# Patient Record
Sex: FEMALE | Race: WHITE | NOT HISPANIC OR LATINO | Employment: OTHER | URBAN - METROPOLITAN AREA
[De-identification: names, ages, dates, MRNs, and addresses within clinical notes are randomized per-mention and may not be internally consistent; named-entity substitution may affect disease eponyms.]

---

## 2017-01-17 ENCOUNTER — ALLSCRIPTS OFFICE VISIT (OUTPATIENT)
Dept: OTHER | Facility: OTHER | Age: 62
End: 2017-01-17

## 2017-02-15 ENCOUNTER — GENERIC CONVERSION - ENCOUNTER (OUTPATIENT)
Dept: OTHER | Facility: OTHER | Age: 62
End: 2017-02-15

## 2017-05-01 ENCOUNTER — ALLSCRIPTS OFFICE VISIT (OUTPATIENT)
Dept: OTHER | Facility: OTHER | Age: 62
End: 2017-05-01

## 2017-05-01 DIAGNOSIS — R53.83 OTHER FATIGUE: ICD-10-CM

## 2017-05-01 DIAGNOSIS — J06.9 ACUTE UPPER RESPIRATORY INFECTION: ICD-10-CM

## 2017-05-01 DIAGNOSIS — E78.5 HYPERLIPIDEMIA: ICD-10-CM

## 2017-05-05 ENCOUNTER — ALLSCRIPTS OFFICE VISIT (OUTPATIENT)
Dept: OTHER | Facility: OTHER | Age: 62
End: 2017-05-05

## 2017-05-05 ENCOUNTER — TRANSCRIBE ORDERS (OUTPATIENT)
Dept: LAB | Facility: CLINIC | Age: 62
End: 2017-05-05

## 2017-06-07 ENCOUNTER — GENERIC CONVERSION - ENCOUNTER (OUTPATIENT)
Dept: OTHER | Facility: OTHER | Age: 62
End: 2017-06-07

## 2017-08-08 ENCOUNTER — GENERIC CONVERSION - ENCOUNTER (OUTPATIENT)
Dept: OTHER | Facility: OTHER | Age: 62
End: 2017-08-08

## 2018-01-11 NOTE — MISCELLANEOUS
Message  I spoke to the patient about her cholesterol   It is average and she is going to continue her dietary measures      Signatures   Electronically signed by : Valerie Bradley DO; Aug  8 2017  5:37PM EST                       (Author)

## 2018-01-12 VITALS
OXYGEN SATURATION: 96 % | SYSTOLIC BLOOD PRESSURE: 116 MMHG | HEART RATE: 99 BPM | DIASTOLIC BLOOD PRESSURE: 68 MMHG | HEIGHT: 67 IN | TEMPERATURE: 98.6 F

## 2018-01-12 VITALS
SYSTOLIC BLOOD PRESSURE: 110 MMHG | HEIGHT: 67 IN | TEMPERATURE: 98.2 F | HEART RATE: 76 BPM | DIASTOLIC BLOOD PRESSURE: 76 MMHG

## 2018-01-14 VITALS
SYSTOLIC BLOOD PRESSURE: 116 MMHG | HEIGHT: 67 IN | HEART RATE: 70 BPM | DIASTOLIC BLOOD PRESSURE: 62 MMHG | TEMPERATURE: 99.3 F

## 2018-01-17 ENCOUNTER — GENERIC CONVERSION - ENCOUNTER (OUTPATIENT)
Dept: INTERNAL MEDICINE CLINIC | Facility: CLINIC | Age: 63
End: 2018-01-17

## 2018-01-22 VITALS — SYSTOLIC BLOOD PRESSURE: 114 MMHG | DIASTOLIC BLOOD PRESSURE: 68 MMHG | HEART RATE: 72 BPM | RESPIRATION RATE: 15 BRPM

## 2018-05-30 ENCOUNTER — TELEPHONE (OUTPATIENT)
Dept: INTERNAL MEDICINE CLINIC | Facility: CLINIC | Age: 63
End: 2018-05-30

## 2018-05-30 ENCOUNTER — OFFICE VISIT (OUTPATIENT)
Dept: INTERNAL MEDICINE CLINIC | Facility: CLINIC | Age: 63
End: 2018-05-30
Payer: COMMERCIAL

## 2018-05-30 VITALS
HEIGHT: 67 IN | DIASTOLIC BLOOD PRESSURE: 70 MMHG | SYSTOLIC BLOOD PRESSURE: 112 MMHG | HEART RATE: 73 BPM | TEMPERATURE: 98.3 F | OXYGEN SATURATION: 99 %

## 2018-05-30 DIAGNOSIS — S91.331A PENETRATING WOUND OF RIGHT FOOT, INITIAL ENCOUNTER: Primary | ICD-10-CM

## 2018-05-30 DIAGNOSIS — G47.00 INSOMNIA, UNSPECIFIED TYPE: ICD-10-CM

## 2018-05-30 DIAGNOSIS — F41.9 ANXIETY: ICD-10-CM

## 2018-05-30 DIAGNOSIS — E78.5 BORDERLINE HYPERLIPIDEMIA: ICD-10-CM

## 2018-05-30 PROCEDURE — 99214 OFFICE O/P EST MOD 30 MIN: CPT | Performed by: INTERNAL MEDICINE

## 2018-05-30 PROCEDURE — 90471 IMMUNIZATION ADMIN: CPT | Performed by: INTERNAL MEDICINE

## 2018-05-30 PROCEDURE — 90715 TDAP VACCINE 7 YRS/> IM: CPT | Performed by: INTERNAL MEDICINE

## 2018-05-30 RX ORDER — DOXYCYCLINE 100 MG/1
CAPSULE ORAL
Refills: 0 | COMMUNITY
Start: 2018-05-29 | End: 2018-07-14

## 2018-05-30 RX ORDER — ZOLPIDEM TARTRATE 10 MG/1
1 TABLET ORAL
COMMUNITY
Start: 2016-04-12 | End: 2018-05-30 | Stop reason: SDUPTHER

## 2018-05-30 RX ORDER — LORAZEPAM 0.5 MG/1
0.5 TABLET ORAL EVERY 8 HOURS PRN
Qty: 30 TABLET | Refills: 0 | Status: SHIPPED | OUTPATIENT
Start: 2018-05-30 | End: 2019-07-09 | Stop reason: SDUPTHER

## 2018-05-30 RX ORDER — LORAZEPAM 0.5 MG/1
TABLET ORAL
COMMUNITY
Start: 2016-02-16 | End: 2018-05-30 | Stop reason: SDUPTHER

## 2018-05-30 RX ORDER — ZOLPIDEM TARTRATE 10 MG/1
10 TABLET ORAL
Qty: 30 TABLET | Refills: 0 | Status: SHIPPED | OUTPATIENT
Start: 2018-05-30 | End: 2019-01-23 | Stop reason: SDUPTHER

## 2018-05-30 NOTE — PROGRESS NOTES
Assessment/Plan:  Regarding the foot injury that seems to be stable and she was given a tetanus shot  Regarding her hyperlipidemia she is going to have complete blood work done  Regarding her anxiety and sleeplessness her lorazepam and her Ambien was renewed  She rarely uses them  She will call for the results of her study  No results found for this or any previous visit (from the past 1008 hour(s))  1  Penetrating wound of right foot, initial encounter  TDAP VACCINE GREATER THAN OR EQUAL TO 8YO IM   2  Borderline hyperlipidemia  CBC and differential    Comprehensive metabolic panel    Lipid panel    UA (URINE) with reflex to Microscopic   3  Anxiety  LORazepam (ATIVAN) 0 5 mg tablet   4  Insomnia, unspecified type  zolpidem (AMBIEN) 10 mg tablet       Orders Placed This Encounter   Procedures    TDAP VACCINE GREATER THAN OR EQUAL TO 8YO IM    CBC and differential    Comprehensive metabolic panel    Lipid panel    UA (URINE) with reflex to Microscopic         Subjective:   Problem are 1  Glass wound of the right foot  2   His history of difficulty sleeping 3  Is borderline hyperlipidemia  sSubjective:   Patient ID: Bridget Shin is a 65 y o  female  HPI I have not seen her in some time  She has a history of borderline hyperlipidemia  She had a glass wound that was extracted by a an urgent center  She needs a tetanus shot which she was given today  Otherwise she has been feeling very well  Has not had any recent blood work but is up-to-date on health maintenance issues with the exception of colonoscopies for which I will refer her to Gastroenterology  The following portions of the patient's history were reviewed and updated as appropriate:   She has no past medical history on file  ,   does not have a problem list on file  ,   has no past surgical history on file  ,  family history is not on file  ,   reports that she has never smoked   She has never used smokeless tobacco  She reports that she does not drink alcohol or use drugs  ,  has No Known Allergies       Current Outpatient Prescriptions:     aspirin 81 MG tablet, Take 81 mg by mouth, Disp: , Rfl:     doxycycline monohydrate (MONODOX) 100 mg capsule, , Disp: , Rfl: 0    LORazepam (ATIVAN) 0 5 mg tablet, Take 1 tablet (0 5 mg total) by mouth every 8 (eight) hours as needed for anxiety, Disp: 30 tablet, Rfl: 0    mupirocin (BACTROBAN) 2 % ointment, , Disp: , Rfl: 0    zolpidem (AMBIEN) 10 mg tablet, Take 1 tablet (10 mg total) by mouth daily at bedtime as needed for sleep, Disp: 30 tablet, Rfl: 0    Review of Systems   Constitutional: Negative for activity change, appetite change, chills, diaphoresis, fatigue, fever and unexpected weight change  HENT: Negative for congestion, ear pain, hearing loss, mouth sores, nosebleeds, postnasal drip, sinus pain, sinus pressure, sore throat and trouble swallowing  Eyes: Negative for pain, discharge and visual disturbance  Respiratory: Negative for apnea, cough, chest tightness, shortness of breath and wheezing  Cardiovascular: Negative for chest pain, palpitations and leg swelling  Gastrointestinal: Negative for abdominal pain, anal bleeding, blood in stool, constipation, diarrhea, nausea and vomiting  Endocrine: Negative for polydipsia and polyphagia  Genitourinary: Negative for decreased urine volume, dysuria, flank pain, frequency, hematuria and urgency  Musculoskeletal: Negative for arthralgias, back pain, gait problem, joint swelling and myalgias  Skin: Negative for rash and wound  Allergic/Immunologic: Negative for environmental allergies and food allergies  Neurological: Negative for dizziness, tremors, seizures, syncope, speech difficulty, light-headedness, numbness and headaches  Hematological: Negative for adenopathy  Does not bruise/bleed easily  Psychiatric/Behavioral: Positive for sleep disturbance   Negative for agitation, confusion, hallucinations and suicidal ideas  The patient is not nervous/anxious  She rarely takes an Ativan for rare anxiety  Objective:  Vitals:    05/30/18 1534   BP: 112/70   Pulse: 73   Temp: 98 3 °F (36 8 °C)   SpO2: 99%     There is no height or weight on file to calculate BMI  Physical Exam   Constitutional: She appears well-developed and well-nourished  No distress  HENT:   Head: Normocephalic  Right Ear: External ear normal    Left Ear: External ear normal    Nose: Nose normal    Mouth/Throat: Oropharynx is clear and moist  No oropharyngeal exudate  Eyes: Conjunctivae and EOM are normal  Pupils are equal, round, and reactive to light  Right eye exhibits no discharge  Left eye exhibits no discharge  Neck: Normal range of motion  Neck supple  No thyromegaly present  Cardiovascular: Normal rate, regular rhythm, normal heart sounds and intact distal pulses  Exam reveals no gallop and no friction rub  No murmur heard  Pulmonary/Chest: Effort normal and breath sounds normal  No respiratory distress  She has no wheezes  She has no rales  Abdominal: Soft  Bowel sounds are normal  She exhibits no distension and no mass  There is no tenderness  There is no rebound and no guarding  She is moderately overweight   Musculoskeletal: Normal range of motion  She exhibits no edema, tenderness or deformity  Lymphadenopathy:     She has no cervical adenopathy  Neurological: She is alert  She has normal reflexes  No cranial nerve deficit  Coordination normal    Skin: Skin is warm and dry  No rash noted  No erythema  Psychiatric: She has a normal mood and affect  Her behavior is normal  Judgment and thought content normal    Nursing note and vitals reviewed

## 2018-06-04 ENCOUNTER — TELEPHONE (OUTPATIENT)
Dept: INTERNAL MEDICINE CLINIC | Facility: CLINIC | Age: 63
End: 2018-06-04

## 2018-06-04 ENCOUNTER — APPOINTMENT (OUTPATIENT)
Dept: LAB | Facility: CLINIC | Age: 63
End: 2018-06-04
Payer: COMMERCIAL

## 2018-06-04 DIAGNOSIS — E78.5 BORDERLINE HYPERLIPIDEMIA: ICD-10-CM

## 2018-06-04 DIAGNOSIS — R53.83 OTHER FATIGUE: Primary | ICD-10-CM

## 2018-06-04 DIAGNOSIS — E53.8 VITAMIN B12 DEFICIENCY: ICD-10-CM

## 2018-06-04 DIAGNOSIS — E53.8 VITAMIN B12 DEFICIENCY: Primary | ICD-10-CM

## 2018-06-04 LAB
ALBUMIN SERPL BCP-MCNC: 3.9 G/DL (ref 3.5–5)
ALP SERPL-CCNC: 90 U/L (ref 46–116)
ALT SERPL W P-5'-P-CCNC: 21 U/L (ref 12–78)
ANION GAP SERPL CALCULATED.3IONS-SCNC: 5 MMOL/L (ref 4–13)
AST SERPL W P-5'-P-CCNC: 17 U/L (ref 5–45)
BASOPHILS # BLD AUTO: 0.07 THOUSANDS/ΜL (ref 0–0.1)
BASOPHILS NFR BLD AUTO: 1 % (ref 0–1)
BILIRUB SERPL-MCNC: 0.47 MG/DL (ref 0.2–1)
BILIRUB UR QL STRIP: NEGATIVE
BUN SERPL-MCNC: 21 MG/DL (ref 5–25)
CALCIUM SERPL-MCNC: 9.3 MG/DL (ref 8.3–10.1)
CHLORIDE SERPL-SCNC: 103 MMOL/L (ref 100–108)
CHOLEST SERPL-MCNC: 198 MG/DL (ref 50–200)
CLARITY UR: CLEAR
CO2 SERPL-SCNC: 29 MMOL/L (ref 21–32)
COLOR UR: YELLOW
CREAT SERPL-MCNC: 0.71 MG/DL (ref 0.6–1.3)
EOSINOPHIL # BLD AUTO: 0.11 THOUSAND/ΜL (ref 0–0.61)
EOSINOPHIL NFR BLD AUTO: 2 % (ref 0–6)
ERYTHROCYTE [DISTWIDTH] IN BLOOD BY AUTOMATED COUNT: 13.2 % (ref 11.6–15.1)
GFR SERPL CREATININE-BSD FRML MDRD: 92 ML/MIN/1.73SQ M
GLUCOSE P FAST SERPL-MCNC: 90 MG/DL (ref 65–99)
GLUCOSE UR STRIP-MCNC: NEGATIVE MG/DL
HCT VFR BLD AUTO: 39.7 % (ref 34.8–46.1)
HDLC SERPL-MCNC: 64 MG/DL (ref 40–60)
HGB BLD-MCNC: 12.6 G/DL (ref 11.5–15.4)
HGB UR QL STRIP.AUTO: NEGATIVE
IMM GRANULOCYTES # BLD AUTO: 0.01 THOUSAND/UL (ref 0–0.2)
IMM GRANULOCYTES NFR BLD AUTO: 0 % (ref 0–2)
KETONES UR STRIP-MCNC: NEGATIVE MG/DL
LDLC SERPL CALC-MCNC: 121 MG/DL (ref 0–100)
LEUKOCYTE ESTERASE UR QL STRIP: NEGATIVE
LYMPHOCYTES # BLD AUTO: 1.6 THOUSANDS/ΜL (ref 0.6–4.47)
LYMPHOCYTES NFR BLD AUTO: 32 % (ref 14–44)
MCH RBC QN AUTO: 30 PG (ref 26.8–34.3)
MCHC RBC AUTO-ENTMCNC: 31.7 G/DL (ref 31.4–37.4)
MCV RBC AUTO: 95 FL (ref 82–98)
MONOCYTES # BLD AUTO: 0.35 THOUSAND/ΜL (ref 0.17–1.22)
MONOCYTES NFR BLD AUTO: 7 % (ref 4–12)
NEUTROPHILS # BLD AUTO: 2.91 THOUSANDS/ΜL (ref 1.85–7.62)
NEUTS SEG NFR BLD AUTO: 58 % (ref 43–75)
NITRITE UR QL STRIP: NEGATIVE
NONHDLC SERPL-MCNC: 134 MG/DL
NRBC BLD AUTO-RTO: 0 /100 WBCS
PH UR STRIP.AUTO: 6 [PH] (ref 4.5–8)
PLATELET # BLD AUTO: 263 THOUSANDS/UL (ref 149–390)
PMV BLD AUTO: 10.7 FL (ref 8.9–12.7)
POTASSIUM SERPL-SCNC: 4.7 MMOL/L (ref 3.5–5.3)
PROT SERPL-MCNC: 7.5 G/DL (ref 6.4–8.2)
PROT UR STRIP-MCNC: NEGATIVE MG/DL
RBC # BLD AUTO: 4.2 MILLION/UL (ref 3.81–5.12)
SODIUM SERPL-SCNC: 137 MMOL/L (ref 136–145)
SP GR UR STRIP.AUTO: 1.02 (ref 1–1.03)
TRIGL SERPL-MCNC: 63 MG/DL
UROBILINOGEN UR QL STRIP.AUTO: 0.2 E.U./DL
VIT B12 SERPL-MCNC: 315 PG/ML (ref 100–900)
WBC # BLD AUTO: 5.05 THOUSAND/UL (ref 4.31–10.16)

## 2018-06-04 PROCEDURE — 81003 URINALYSIS AUTO W/O SCOPE: CPT | Performed by: INTERNAL MEDICINE

## 2018-06-04 PROCEDURE — 80053 COMPREHEN METABOLIC PANEL: CPT

## 2018-06-04 PROCEDURE — 80061 LIPID PANEL: CPT

## 2018-06-04 PROCEDURE — 85025 COMPLETE CBC W/AUTO DIFF WBC: CPT

## 2018-06-04 PROCEDURE — 82607 VITAMIN B-12: CPT

## 2018-06-04 PROCEDURE — 36415 COLL VENOUS BLD VENIPUNCTURE: CPT

## 2018-07-14 ENCOUNTER — OFFICE VISIT (OUTPATIENT)
Dept: INTERNAL MEDICINE CLINIC | Facility: CLINIC | Age: 63
End: 2018-07-14
Payer: COMMERCIAL

## 2018-07-14 ENCOUNTER — TELEPHONE (OUTPATIENT)
Dept: INTERNAL MEDICINE CLINIC | Facility: CLINIC | Age: 63
End: 2018-07-14

## 2018-07-14 VITALS
HEART RATE: 92 BPM | OXYGEN SATURATION: 95 % | RESPIRATION RATE: 16 BRPM | SYSTOLIC BLOOD PRESSURE: 104 MMHG | DIASTOLIC BLOOD PRESSURE: 72 MMHG

## 2018-07-14 DIAGNOSIS — H93.8X3 EAR CONGESTION, BILATERAL: Primary | ICD-10-CM

## 2018-07-14 DIAGNOSIS — R09.82 POST-NASAL DRIP: ICD-10-CM

## 2018-07-14 PROCEDURE — 99214 OFFICE O/P EST MOD 30 MIN: CPT | Performed by: PHYSICIAN ASSISTANT

## 2018-07-14 RX ORDER — FLUTICASONE PROPIONATE 50 MCG
2 SPRAY, SUSPENSION (ML) NASAL DAILY
Qty: 16 G | Refills: 2 | Status: SHIPPED | OUTPATIENT
Start: 2018-07-14 | End: 2018-07-14 | Stop reason: SDUPTHER

## 2018-07-14 RX ORDER — FLUTICASONE PROPIONATE 50 MCG
2 SPRAY, SUSPENSION (ML) NASAL DAILY
Qty: 16 G | Refills: 3 | Status: SHIPPED | OUTPATIENT
Start: 2018-07-14 | End: 2019-01-23 | Stop reason: CLARIF

## 2018-07-14 RX ORDER — PHENOL 1.4 %
600 AEROSOL, SPRAY (ML) MUCOUS MEMBRANE 2 TIMES DAILY WITH MEALS
COMMUNITY

## 2018-07-14 NOTE — TELEPHONE ENCOUNTER
Patient is at the pharmacy waiting for the RX for the nasal spray that was prescribed for her today  Can this be sent RAFAEL George Please ? ??

## 2018-07-14 NOTE — TELEPHONE ENCOUNTER
It was sent during her visit  I cannot make it go any faster  Since the pharmacy is very closeby, she probably got there too early,  Before they received it in their queue  Did she asked the pharmacy if they received the prescription? I cannot control how fast the pharmacy fills a prescription

## 2018-07-14 NOTE — PROGRESS NOTES
Assessment/Plan:     ear congestion //eustachian tube dysfunction- the patient may continue to use Sudafed as directed by the package until the symptoms resolve  She should especially use it before her flight  She should refrain from using Afrin for long periods of time  Only use this for several days at a time  In between use Flonase nasal spray daily and start taking an over-the-counter nonsedating antihistamine  No problem-specific Assessment & Plan notes found for this encounter  Diagnoses and all orders for this visit:    Post-nasal drip  -     Discontinue: fluticasone (FLONASE) 50 mcg/act nasal spray; 2 sprays into each nostril daily  -     fluticasone (FLONASE) 50 mcg/act nasal spray; 2 sprays into each nostril daily    Other orders  -     calcium carbonate (OS-RAYMUNDO) 600 MG tablet; Take 600 mg by mouth 2 (two) times a day with meals          Subjective:      Patient ID: Maya Frazier is a 65 y o  female  Patient comes in complaining of a clogged feeling in both of her ears  She has no pain  She has no  Sore throat, fever, chills or sweats  He slight cough but she blames this on mucus draining down her throat  Her nasal discharge is minimal and clear  She has bit of a stuffy nose  She says she flew home from [facility] 2 days ago  On her flight going there and coming back everything was fine until she descended on the way back  Since then she has had a muffled, pressure sensation in both of her ears  She has been using Afrin nose drops for the last 2 days and Sudafed  She is concerned because she is flying again next Wednesday, In 4 days, to [place]  The following portions of the patient's history were reviewed and updated as appropriate: allergies, current medications, past family history, past medical history, past social history, past surgical history and problem list     Review of Systems   Constitutional: Negative for chills and fever     HENT: Positive for congestion, postnasal drip and rhinorrhea  Negative for ear discharge, ear pain, sinus pain, sinus pressure and sore throat  Muffled congested sensation in ears   Respiratory: Positive for cough  Negative for shortness of breath  Cardiovascular: Negative for chest pain and palpitations  Gastrointestinal: Negative for abdominal pain  Objective:      /72   Pulse 92   Resp 16   SpO2 95%          Physical Exam   Constitutional: She appears well-developed and well-nourished  HENT:   Head: Normocephalic and atraumatic  Right Ear: Hearing, tympanic membrane, external ear and ear canal normal    Left Ear: Hearing, tympanic membrane, external ear and ear canal normal    Mouth/Throat: Oropharynx is clear and moist  No oropharyngeal exudate  Neck: Normal range of motion  Cardiovascular: Normal rate and regular rhythm  Pulmonary/Chest: Effort normal and breath sounds normal  No respiratory distress  Abdominal: Soft  Bowel sounds are normal  There is no tenderness

## 2018-10-05 ENCOUNTER — TELEPHONE (OUTPATIENT)
Dept: NEUROLOGY | Facility: CLINIC | Age: 63
End: 2018-10-05

## 2018-10-18 ENCOUNTER — TELEPHONE (OUTPATIENT)
Dept: INTERNAL MEDICINE CLINIC | Facility: CLINIC | Age: 63
End: 2018-10-18

## 2018-10-18 NOTE — TELEPHONE ENCOUNTER
PT  ROC  THINKS SHE WAS   BITE  BY  TICK   WANTS  TO  KNOW  IF  SHE  SHOULD  BE  ON  RX   [facility]  [address]  PT  [phone]

## 2018-11-05 ENCOUNTER — OFFICE VISIT (OUTPATIENT)
Dept: INTERNAL MEDICINE CLINIC | Facility: CLINIC | Age: 63
End: 2018-11-05
Payer: COMMERCIAL

## 2018-11-05 VITALS
DIASTOLIC BLOOD PRESSURE: 68 MMHG | HEART RATE: 80 BPM | OXYGEN SATURATION: 99 % | SYSTOLIC BLOOD PRESSURE: 136 MMHG | HEIGHT: 67 IN
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
## 2018-11-05 DIAGNOSIS — M13.0 POLYARTHRITIS: Primary | ICD-10-CM

## 2018-11-05 DIAGNOSIS — R53.83 OTHER FATIGUE: ICD-10-CM

## 2018-11-05 DIAGNOSIS — R21 FACIAL RASH: ICD-10-CM

## 2018-11-05 PROCEDURE — 99215 OFFICE O/P EST HI 40 MIN: CPT | Performed by: INTERNAL MEDICINE

## 2018-11-05 NOTE — PROGRESS NOTES
Assessment/Plan:  The patient has fatigue myalgias back pain and hip pain  She did have a tick bite and has a malar rash apparently under her makeup  She needs a rheumatological workup  She is going to have labs done  She is going to see a dermatologist     She is going to have x-rays of her hips and back  She is going to monitor her temperature over the next 2 weeks and see me back here in 10 days to review the rheumatological data  She may need referral to a rheumatologist   45 minutes spent with this patient  No results found for this or any previous visit (from the past 1008 hour(s))  1  Polyarthritis  CBC and Platelet    Comprehensive metabolic panel    RF Screen w/ Reflex to Titer    Lyme Antibody Profile with reflex to WB    Anti-DNA antibody, double-stranded    Sedimentation rate, automated    XR hip/pelv 2-3 vws left if performed    XR hip/pelv 2-3 vws right if performed    XR spine lumbar minimum 4 views non injury    UA (URINE) with reflex to Microscopic   2  Facial rash     3  Other fatigue  Cortisol Level, AM Specimen    T4, free    TSH, 3rd generation    Protein electrophoresis, serum   4  Tick bite, initial encounter         Orders Placed This Encounter   Procedures    XR hip/pelv 2-3 vws left if performed    XR hip/pelv 2-3 vws right if performed    XR spine lumbar minimum 4 views non injury    CBC and Platelet    Comprehensive metabolic panel    Cortisol Level, AM Specimen    T4, free    TSH, 3rd generation    RF Screen w/ Reflex to Titer    Lyme Antibody Profile with reflex to WB    Anti-DNA antibody, double-stranded    Protein electrophoresis, serum    Sedimentation rate, automated    UA (URINE) with reflex to Microscopic         Subjective:  She came see in with polyarthralgias and malaise  She has not felt well for a couple months  She said she developed right [place]  It was sudden onset  It swelled up    She waited 2 months and then saw an orthopedist in [facility] who tapped the knee and said she had arthritis  Other that she has not felt well recently over about 2 months  She has had multiple joint discomfort including her hips and her back  Also both knees  She also suffered a recent partial retinal detachment  She has noticed that her face turns red and seems to be read in the nasal of crest the bridge of the nose and both cheeks  She says she is tired all the time  She had a tick bite in addition  She took 2 doses of doxycycline after going to an Urgent Center  Patient ID: Nicolas Hernandez is a 59 y o  female  HPI    The following portions of the patient's history were reviewed and updated as appropriate:   She has no past medical history on file  ,   does not have a problem list on file  ,   has no past surgical history on file  ,  family history includes Celiac disease in her child and father; Other in her family and father  ,   reports that she has never smoked  She has never used smokeless tobacco  She reports that she does not drink alcohol or use drugs  ,  has No Known Allergies       Current Outpatient Prescriptions:     calcium carbonate (OS-RAYMUNDO) 600 MG tablet, Take 600 mg by mouth 2 (two) times a day with meals, Disp: , Rfl:     LORazepam (ATIVAN) 0 5 mg tablet, Take 1 tablet (0 5 mg total) by mouth every 8 (eight) hours as needed for anxiety, Disp: 30 tablet, Rfl: 0    zolpidem (AMBIEN) 10 mg tablet, Take 1 tablet (10 mg total) by mouth daily at bedtime as needed for sleep, Disp: 30 tablet, Rfl: 0    aspirin 81 MG tablet, Take 81 mg by mouth, Disp: , Rfl:     fluticasone (FLONASE) 50 mcg/act nasal spray, 2 sprays into each nostril daily (Patient not taking: Reported on 11/5/2018 ), Disp: 16 g, Rfl: 3    mupirocin (BACTROBAN) 2 % ointment, , Disp: , Rfl: 0    Review of Systems   Constitutional: Positive for fatigue   Negative for activity change, appetite change, chills, diaphoresis, fever and unexpected weight change  HENT: Negative for congestion, ear pain, hearing loss, mouth sores, nosebleeds, postnasal drip, sinus pain, sinus pressure, sore throat and trouble swallowing  Eyes: Negative for pain, discharge and visual disturbance  She has a partially detached retina   Respiratory: Negative for apnea, cough, chest tightness, shortness of breath and wheezing  Cardiovascular: Negative for chest pain, palpitations and leg swelling  Gastrointestinal: Negative for abdominal pain, anal bleeding, blood in stool, constipation, diarrhea, nausea and vomiting  She is not up-to-date on colonoscopies  Endocrine: Negative for polydipsia and polyphagia  Genitourinary: Negative for decreased urine volume, dysuria, flank pain, frequency, hematuria and urgency  She is not up-to-date on pelvic exams  Musculoskeletal: Positive for arthralgias, back pain and joint swelling  Negative for gait problem and myalgias  Skin: Positive for rash  Negative for wound  She says she has a malar rash   Allergic/Immunologic: Negative for environmental allergies and food allergies  Neurological: Negative for dizziness, tremors, seizures, syncope, speech difficulty, light-headedness, numbness and headaches  Hematological: Negative for adenopathy  Does not bruise/bleed easily  Psychiatric/Behavioral: Negative for agitation, confusion, hallucinations, sleep disturbance and suicidal ideas  The patient is not nervous/anxious  Objective:  /68 (BP Location: Left arm, Patient Position: Sitting)   Pulse 80   Ht 5' 7" (1 702 m)   SpO2 99%      Physical Exam   Constitutional: She appears well-developed and well-nourished  No distress  Temperature is 98 6°   HENT:   Head: Normocephalic  Right Ear: External ear normal    Left Ear: External ear normal    Nose: Nose normal    Mouth/Throat: Oropharynx is clear and moist  No oropharyngeal exudate     Eyes: Pupils are equal, round, and reactive to light  Conjunctivae and EOM are normal  Right eye exhibits no discharge  Left eye exhibits no discharge  Neck: Normal range of motion  Neck supple  No thyromegaly present  Cardiovascular: Normal rate, regular rhythm, normal heart sounds and intact distal pulses  Exam reveals no gallop and no friction rub  No murmur heard  Pulmonary/Chest: Effort normal and breath sounds normal  No respiratory distress  She has no wheezes  She has no rales  Abdominal: Soft  Bowel sounds are normal  She exhibits no distension and no mass  There is no tenderness  There is no rebound and no guarding  Musculoskeletal: Normal range of motion  She exhibits no edema, tenderness or deformity  Lymphadenopathy:     She has no cervical adenopathy  Neurological: She is alert  She has normal reflexes  No cranial nerve deficit  Coordination normal    Skin: Skin is warm and dry  No rash noted  No erythema  Psychiatric: She has a normal mood and affect  Her behavior is normal  Judgment and thought content normal    Nursing note and vitals reviewed

## 2018-11-06 ENCOUNTER — TELEPHONE (OUTPATIENT)
Dept: INTERNAL MEDICINE CLINIC | Facility: CLINIC | Age: 63
End: 2018-11-06

## 2018-11-06 NOTE — TELEPHONE ENCOUNTER
PT HAS APPT TO GET XRAY'S DONE AT 1:00PM  2 OF THE XRAY ORDERS SAY "IF PERFORMED", [facility] ARE QUESTIONING WHY 2 OF THE XRAY'S SAY IF PERFORMED    PLEASE ADVISE, CALL BACK

## 2018-11-07 LAB
ALBUMIN SERPL ELPH-MCNC: 3.7 G/DL (ref 3.8–4.8)
ALBUMIN SERPL-MCNC: 3.8 G/DL (ref 3.6–5.1)
ALBUMIN/GLOB SERPL: 1.5 (CALC) (ref 1–2.5)
ALP SERPL-CCNC: 80 U/L (ref 33–130)
ALPHA1 GLOB SERPL ELPH-MCNC: 0.3 G/DL (ref 0.2–0.3)
ALPHA2 GLOB SERPL ELPH-MCNC: 0.7 G/DL (ref 0.5–0.9)
ALT SERPL-CCNC: 12 U/L (ref 6–29)
APPEARANCE UR: CLEAR
AST SERPL-CCNC: 14 U/L (ref 10–35)
B BURGDOR AB SER IA-ACNC: <0.9 INDEX
BASOPHILS # BLD AUTO: 60 CELLS/UL (ref 0–200)
BASOPHILS NFR BLD AUTO: 1 %
BETA1 GLOB SERPL ELPH-MCNC: 0.4 G/DL (ref 0.4–0.6)
BETA2 GLOB SERPL ELPH-MCNC: 0.3 G/DL (ref 0.2–0.5)
BILIRUB SERPL-MCNC: 0.6 MG/DL (ref 0.2–1.2)
BILIRUB UR QL STRIP: NEGATIVE
BUN SERPL-MCNC: 19 MG/DL (ref 7–25)
BUN/CREAT SERPL: NORMAL (CALC) (ref 6–22)
CALCIUM SERPL-MCNC: 8.6 MG/DL (ref 8.6–10.4)
CHLORIDE SERPL-SCNC: 104 MMOL/L (ref 98–110)
CO2 SERPL-SCNC: 26 MMOL/L (ref 20–32)
COLOR UR: YELLOW
CORTIS AM PEAK SERPL-MCNC: 4 MCG/DL
CREAT SERPL-MCNC: 0.71 MG/DL (ref 0.5–0.99)
DSDNA AB SER-ACNC: 6 IU/ML
EOSINOPHIL # BLD AUTO: 162 CELLS/UL (ref 15–500)
EOSINOPHIL NFR BLD AUTO: 2.7 %
ERYTHROCYTE [DISTWIDTH] IN BLOOD BY AUTOMATED COUNT: 12.6 % (ref 11–15)
ERYTHROCYTE [SEDIMENTATION RATE] IN BLOOD BY WESTERGREN METHOD: 6 MM/H
GAMMA GLOB SERPL ELPH-MCNC: 0.9 G/DL (ref 0.8–1.7)
GLOBULIN SER CALC-MCNC: 2.5 G/DL (CALC) (ref 1.9–3.7)
GLUCOSE SERPL-MCNC: 87 MG/DL (ref 65–99)
GLUCOSE UR QL STRIP: NEGATIVE
HCT VFR BLD AUTO: 35.3 % (ref 35–45)
HGB BLD-MCNC: 11.7 G/DL (ref 11.7–15.5)
HGB UR QL STRIP: NEGATIVE
KETONES UR QL STRIP: NEGATIVE
LEUKOCYTE ESTERASE UR QL STRIP: NEGATIVE
LYMPHOCYTES # BLD AUTO: 1230 CELLS/UL (ref 850–3900)
LYMPHOCYTES NFR BLD AUTO: 20.5 %
MCH RBC QN AUTO: 30.1 PG (ref 27–33)
MCHC RBC AUTO-ENTMCNC: 33.1 G/DL (ref 32–36)
MCV RBC AUTO: 90.7 FL (ref 80–100)
MONOCYTES # BLD AUTO: 450 CELLS/UL (ref 200–950)
MONOCYTES NFR BLD AUTO: 7.5 %
NEUTROPHILS # BLD AUTO: 4098 CELLS/UL (ref 1500–7800)
NEUTROPHILS NFR BLD AUTO: 68.3 %
NITRITE UR QL STRIP: NEGATIVE
PH UR STRIP: 5.5 [PH] (ref 5–8)
PLATELET # BLD AUTO: 269 THOUSAND/UL (ref 140–400)
PMV BLD REES-ECKER: 9.6 FL (ref 7.5–12.5)
POTASSIUM SERPL-SCNC: 4.4 MMOL/L (ref 3.5–5.3)
PROT SERPL-MCNC: 6.3 G/DL (ref 6.1–8.1)
PROT SERPL-MCNC: 6.3 G/DL (ref 6.1–8.1)
PROT UR QL STRIP: NEGATIVE
RBC # BLD AUTO: 3.89 MILLION/UL (ref 3.8–5.1)
RHEUMATOID FACT SERPL-ACNC: <14 IU/ML
SL AMB EGFR AFRICAN AMERICAN: 106 ML/MIN/1.73M2
SL AMB EGFR NON AFRICAN AMERICAN: 91 ML/MIN/1.73M2
SODIUM SERPL-SCNC: 136 MMOL/L (ref 135–146)
SP GR UR STRIP: 1.01 (ref 1–1.03)
T4 FREE SERPL-MCNC: 1.1 NG/DL (ref 0.8–1.8)
TSH SERPL-ACNC: 2.72 MIU/L (ref 0.4–4.5)
WBC # BLD AUTO: 6 THOUSAND/UL (ref 3.8–10.8)

## 2018-11-21 ENCOUNTER — OFFICE VISIT (OUTPATIENT)
Dept: INTERNAL MEDICINE CLINIC | Facility: CLINIC | Age: 63
End: 2018-11-21
Payer: COMMERCIAL

## 2018-11-21 VITALS
DIASTOLIC BLOOD PRESSURE: 66 MMHG | HEART RATE: 86 BPM | OXYGEN SATURATION: 96 % | HEIGHT: 67 IN | SYSTOLIC BLOOD PRESSURE: 118 MMHG

## 2018-11-21 DIAGNOSIS — M13.0 POLYARTHRITIS: ICD-10-CM

## 2018-11-21 DIAGNOSIS — Z23 ENCOUNTER FOR IMMUNIZATION: Primary | ICD-10-CM

## 2018-11-21 PROCEDURE — 99214 OFFICE O/P EST MOD 30 MIN: CPT | Performed by: INTERNAL MEDICINE

## 2018-11-21 PROCEDURE — 1036F TOBACCO NON-USER: CPT | Performed by: INTERNAL MEDICINE

## 2018-11-21 PROCEDURE — 90682 RIV4 VACC RECOMBINANT DNA IM: CPT | Performed by: INTERNAL MEDICINE

## 2018-11-21 PROCEDURE — 90471 IMMUNIZATION ADMIN: CPT | Performed by: INTERNAL MEDICINE

## 2018-11-21 RX ORDER — LANOLIN ALCOHOL/MO/W.PET/CERES
1 CREAM (GRAM) TOPICAL DAILY
COMMUNITY

## 2018-11-21 NOTE — PROGRESS NOTES
Assessment/Plan:  Physically she feels much better than she did  It is possible she had a viral syndrome  Lyme serology was negative  She did have a borderline low cortisol and anti-double-stranded DNA  We decided to repeat those in 2 months and see her at that time  If any other symptoms developed prior to that time she will let me know  She will get a flu shot today      Recent Results (from the past 1008 hour(s))   Protein electrophoresis, serum    Collection Time: 11/06/18  9:44 AM   Result Value Ref Range    SL AMB PROTEIN, TOTAL 6 3 6 1 - 8 1 g/dL    Albumin, Electrophoresis 3 7 (L) 3 8 - 4 8 g/dL    Alpha-1 Globulin 0 3 0 2 - 0 3 g/dL    Alpha-2 Globulin 0 7 0 5 - 0 9 g/dL    Beta 1 Globulin 0 4 0 4 - 0 6 g/dL    Beta 2 Globulin 0 3 0 2 - 0 5 g/dL    Gamma Globulin 0 9 0 8 - 1 7 g/dL    Interpretation     Comprehensive metabolic panel    Collection Time: 11/06/18  9:44 AM   Result Value Ref Range    Glucose, Random 87 65 - 99 mg/dL    BUN 19 7 - 25 mg/dL    Creatinine 0 71 0 50 - 0 99 mg/dL    eGFR Non  91 > OR = 60 mL/min/1 73m2    SL AMB EGFR  106 > OR = 60 mL/min/1 73m2    SL AMB BUN/CREATININE RATIO NOT APPLICABLE 6 - 22 (calc)    Sodium 136 135 - 146 mmol/L    Potassium 4 4 3 5 - 5 3 mmol/L    Chloride 104 98 - 110 mmol/L    CO2 26 20 - 32 mmol/L    SL AMB CALCIUM 8 6 8 6 - 10 4 mg/dL    SL AMB PROTEIN, TOTAL 6 3 6 1 - 8 1 g/dL    Albumin 3 8 3 6 - 5 1 g/dL    Globulin 2 5 1 9 - 3 7 g/dL (calc)    Albumin/Globulin Ratio 1 5 1 0 - 2 5 (calc)    TOTAL BILIRUBIN 0 6 0 2 - 1 2 mg/dL    Alkaline Phosphatase 80 33 - 130 U/L    SL AMB AST 14 10 - 35 U/L    SL AMB ALT 12 6 - 29 U/L   Sedimentation rate, automated    Collection Time: 11/06/18  9:44 AM   Result Value Ref Range    Sedimentation Rate 6 < OR = 30 mm/h   CBC and differential    Collection Time: 11/06/18  9:44 AM   Result Value Ref Range    White Blood Cell Count 6 0 3 8 - 10 8 Thousand/uL    Red Blood Cell Count 3  89 3 80 - 5 10 Million/uL    Hemoglobin 11 7 11 7 - 15 5 g/dL    HCT 35 3 35 0 - 45 0 %    MCV 90 7 80 0 - 100 0 fL    MCH 30 1 27 0 - 33 0 pg    MCHC 33 1 32 0 - 36 0 g/dL    RDW 12 6 11 0 - 15 0 %    Platelet Count 729 960 - 400 Thousand/uL    SL AMB MPV 9 6 7 5 - 12 5 fL    Neutrophils (Absolute) 4,098 1,500 - 7,800 cells/uL    Lymphocytes (Absolute) 1,230 850 - 3,900 cells/uL    Monocytes (Absolute) 450 200 - 950 cells/uL    Eosinophils (Absolute) 162 15 - 500 cells/uL    Basophils ABS 60 0 - 200 cells/uL    Neutrophils 68 3 %    Lymphocytes 20 5 %    Monocytes 7 5 %    Eosinophils 2 7 %    Basophils PCT 1 0 %   Lyme Antibody Profile with reflex to WB    Collection Time: 11/06/18  9:44 AM   Result Value Ref Range    SL AMB LYME AB SCREEN <0 90 index   Anti-DNA antibody, double-stranded    Collection Time: 11/06/18  9:44 AM   Result Value Ref Range    ds DNA Ab 6 (H) IU/mL   Rheumatoid Factor    Collection Time: 11/06/18  9:44 AM   Result Value Ref Range    Rheumatoid Factor <14 <14 IU/mL   T4, free    Collection Time: 11/06/18  9:44 AM   Result Value Ref Range    Free t4 1 1 0 8 - 1 8 ng/dL   TSH, 3rd generation    Collection Time: 11/06/18  9:44 AM   Result Value Ref Range    TSH 2 72 0 40 - 4 50 mIU/L   Cortisol Level, AM Specimen    Collection Time: 11/06/18  9:44 AM   Result Value Ref Range    Cortisol AM 4 0 mcg/dL   Urinalysis with reflex to microscopic    Collection Time: 11/06/18  9:44 AM   Result Value Ref Range    Color UA YELLOW YELLOW    Urine Appearance CLEAR CLEAR    Specific Gravity 1 009 1 001 - 1 035    Ph 5 5 5 0 - 8 0    Glucose, Urine NEGATIVE NEGATIVE    Bilirubin, Urine NEGATIVE NEGATIVE    Ketone, Urine NEGATIVE NEGATIVE    Blood, Urine NEGATIVE NEGATIVE    Protein, Urine NEGATIVE NEGATIVE    SL AMB NITRITES URINE, QUAL  NEGATIVE NEGATIVE    Leukocyte Esterase NEGATIVE NEGATIVE       1   Encounter for immunization  influenza vaccine, [id number], quadrivalent, recombinant, PF, 0 5 mL, for patients 18 yr+ (FLUBLOK)   2  Polyarthritis  Cortisol Level, AM Specimen    Anti-DNA antibody, double-stranded       Orders Placed This Encounter   Procedures    influenza vaccine, [id number], quadrivalent, recombinant, PF, 0 5 mL, for patients 18 yr+ (FLUBLOK)    Cortisol Level, AM Specimen    Anti-DNA antibody, double-stranded         Subjective:  Poly arthralgias and fatigue     Patient ID: Zander Weathers is a 59 y o  female  HPI she comes in for follow-up  She is feeling much better  Her nodules are better and her fatigue is somewhat better as well  She is still having some knee pain  Workup showed intermediate anti-double-stranded DNA  Her malar rash went away completely  Her cortisol level was borderline low but was taking it 9:30 a m  Esvin Smith The following portions of the patient's history were reviewed and updated as appropriate:   She has no past medical history on file  ,   does not have a problem list on file  ,   has no past surgical history on file  ,  family history includes Celiac disease in her child and father; Other in her family and father  ,   reports that she has never smoked  She has never used smokeless tobacco  She reports that she does not drink alcohol or use drugs  ,  has No Known Allergies       Current Outpatient Prescriptions:     calcium carbonate (OS-RAYMUNDO) 600 MG tablet, Take 600 mg by mouth 2 (two) times a day with meals, Disp: , Rfl:     glucosamine-chondroitin 500-400 MG tablet, Take 1 tablet by mouth daily, Disp: , Rfl:     LORazepam (ATIVAN) 0 5 mg tablet, Take 1 tablet (0 5 mg total) by mouth every 8 (eight) hours as needed for anxiety, Disp: 30 tablet, Rfl: 0    zolpidem (AMBIEN) 10 mg tablet, Take 1 tablet (10 mg total) by mouth daily at bedtime as needed for sleep, Disp: 30 tablet, Rfl: 0    aspirin 81 MG tablet, Take 81 mg by mouth, Disp: , Rfl:     fluticasone (FLONASE) 50 mcg/act nasal spray, 2 sprays into each nostril daily (Patient not taking: Reported on 11/21/2018 ), Disp: 16 g, Rfl: 3    mupirocin (BACTROBAN) 2 % ointment, , Disp: , Rfl: 0    Review of Systems   Constitutional: Negative for activity change, appetite change, chills, diaphoresis, fatigue, fever and unexpected weight change  Overall feeling much better  Feels more like herself  No definite fevers though she forgot to take her temperature  HENT: Negative for congestion, ear pain, hearing loss, mouth sores, nosebleeds, postnasal drip, sinus pain, sinus pressure, sore throat and trouble swallowing  Eyes: Negative for pain, discharge and visual disturbance  Respiratory: Negative for apnea, cough, chest tightness, shortness of breath and wheezing  Cardiovascular: Negative for chest pain, palpitations and leg swelling  Gastrointestinal: Negative for abdominal pain, anal bleeding, blood in stool, constipation, diarrhea, nausea and vomiting  Endocrine: Negative for polydipsia and polyphagia  Genitourinary: Negative for decreased urine volume, dysuria, flank pain, frequency, hematuria and urgency  Musculoskeletal: Negative for arthralgias, back pain, gait problem, joint swelling and myalgias  Skin: Negative for rash and wound  Allergic/Immunologic: Negative for environmental allergies and food allergies  Neurological: Negative for dizziness, tremors, seizures, syncope, speech difficulty, light-headedness, numbness and headaches  Hematological: Negative for adenopathy  Does not bruise/bleed easily  Psychiatric/Behavioral: Negative for agitation, confusion, hallucinations, sleep disturbance and suicidal ideas  The patient is not nervous/anxious  Objective:  /66 (BP Location: Left arm, Patient Position: Sitting)   Pulse 86   Ht 5' 7" (1 702 m)   SpO2 96%      Physical Exam   Constitutional: She appears well-developed and well-nourished  No distress  Temperature is 98 2°  HENT:   Head: Normocephalic     Right Ear: External ear normal    Left Ear: External ear normal    Nose: Nose normal    Mouth/Throat: Oropharynx is clear and moist  No oropharyngeal exudate  Eyes: Pupils are equal, round, and reactive to light  Conjunctivae and EOM are normal  Right eye exhibits no discharge  Left eye exhibits no discharge  Neck: Normal range of motion  Neck supple  No thyromegaly present  Cardiovascular: Normal rate, regular rhythm, normal heart sounds and intact distal pulses  Exam reveals no gallop and no friction rub  No murmur heard  Pulmonary/Chest: Effort normal and breath sounds normal  No respiratory distress  She has no wheezes  She has no rales  Abdominal: Soft  Bowel sounds are normal  She exhibits no distension and no mass  There is no tenderness  There is no rebound and no guarding  Musculoskeletal: Normal range of motion  She exhibits no edema, tenderness or deformity  Lymphadenopathy:     She has no cervical adenopathy  Neurological: She is alert  She has normal reflexes  No cranial nerve deficit  Coordination normal    Skin: Skin is warm and dry  No rash noted  No erythema  Malar rash has disappeared  Psychiatric: She has a normal mood and affect  Her behavior is normal  Judgment and thought content normal    Nursing note and vitals reviewed

## 2019-01-09 ENCOUNTER — EVALUATION (OUTPATIENT)
Dept: PHYSICAL THERAPY | Facility: CLINIC | Age: 64
End: 2019-01-09
Payer: COMMERCIAL

## 2019-01-09 ENCOUNTER — TRANSCRIBE ORDERS (OUTPATIENT)
Dept: PHYSICAL THERAPY | Facility: CLINIC | Age: 64
End: 2019-01-09

## 2019-01-09 DIAGNOSIS — G89.29 CHRONIC PAIN OF RIGHT KNEE: Primary | ICD-10-CM

## 2019-01-09 DIAGNOSIS — M25.561 CHRONIC PAIN OF RIGHT KNEE: Primary | ICD-10-CM

## 2019-01-09 DIAGNOSIS — M25.561 RIGHT KNEE PAIN, UNSPECIFIED CHRONICITY: Primary | ICD-10-CM

## 2019-01-09 PROCEDURE — G8991 OTHER PT/OT GOAL STATUS: HCPCS | Performed by: PHYSICAL THERAPIST

## 2019-01-09 PROCEDURE — G8990 OTHER PT/OT CURRENT STATUS: HCPCS | Performed by: PHYSICAL THERAPIST

## 2019-01-09 PROCEDURE — 97161 PT EVAL LOW COMPLEX 20 MIN: CPT | Performed by: PHYSICAL THERAPIST

## 2019-01-09 NOTE — LETTER
2019    Luetta Cheadle, [facility]    Patient: Wes Nunes   YOB: 1955   Date of Visit: 2019     Encounter Diagnosis     ICD-10-CM    1  Right knee pain, unspecified chronicity M25 561        Dear Dr Jonathan Navarro:    Please review the attached Plan of Care from [facility] recent visit  Please verify that you agree therapy should continue by signing the attached document and sending it back to our office  If you have any questions or concerns, please don't hesitate to call  Sincerely,    Rolando Gilliland PT      Referring Provider:      I certify that I have read the below Plan of Care and certify the need for these services furnished under this plan of treatment while under my care  Luetta Cheadle, MD  [address]: [phone]          PT Evaluation     Today's date: 2019  Patient name: Wes Nunes  : 1955  MRN: [id number]  Referring provider: Denny Zhu MD  Dx:   Encounter Diagnosis     ICD-10-CM    1  Right knee pain, unspecified chronicity M25 561        Start Time: 1400  Stop Time: 1445  Total time in clinic (min): 45 minutes    Assessment  Assessment details: Wes Nunes is a 59 y o  female who presents to the clinic with complaints of right knee pain spanning several months  Patient with increased pain during ambulation, stairs, and with transitional movements from knee extension to flexion  Patient presents with the above listed impairments  She is motivated to decrease her pain and should benefit from skilled physical therapy  Thank you for the referral       Impairments: abnormal gait, abnormal or restricted ROM, activity intolerance, impaired physical strength, lacks appropriate home exercise program, pain with function and weight-bearing intolerance  Understanding of Dx/Px/POC: good   Prognosis: good    Goals  Impairment Goals:  1    Patient will decrease complaints of pain to 4/10 at worst in 2 weeks  2  Patient will increase ROM to at least 125 knee flexion on right in 4 weeks  3  Patient will increase hip strength by 1/2 grade in 4 weeks    Functional Goals:  1  Patient will be independent with HEP by discharge  2  Patient will increase FOTO to at least 63  3  Patient will ascend/descend flight of stairs with decreased pain in 4 weeks  4  Patient will tolerate prone lying with decreased pain in 4 weeks  5  Patient will tolerate wearing shoes with a heel with decreased complaints of pain in 4 weeks      Plan  Patient would benefit from: skilled physical therapy  Planned modality interventions: TENS, thermotherapy: hydrocollator packs and cryotherapy  Planned therapy interventions: abdominal trunk stabilization, balance/weight bearing training, balance, flexibility, functional ROM exercises, graded activity, graded exercise, home exercise program, gait training, therapeutic training, therapeutic exercise, therapeutic activities, stretching, strengthening, patient education, neuromuscular re-education, muscle pump exercises, motor coordination training, Sampson taping, manual therapy, joint mobilization and massage  Frequency: 2x week  Duration in weeks: 4  Treatment plan discussed with: patient        Subjective Evaluation    History of Present Illness  Mechanism of injury: Patient reports right knee pain starting in September after walking on vacation at the beach  The patient notes she had an injection in he right knee in October with relief of symptoms  She now notes kicking a ball in December and feeling the pain resurface again  Patient had seen an ortho in October and was referred to physical therapy  Pain Location:  Right lumbar spine as well as posterior right knee   Occupation:    Prior Functional Limitations:   Independent,   AGG:  Kneeling, ascending/descenidng, sleeping prone, wearing heels  Ease:  Ice  Patient Goals:  "I just want to get rid of the pain"    Pain  Current pain ratin  At best pain ratin  At worst pain ratin  Quality: sharp and knife-like    Patient Goals  Patient goal: I just want to get rid of the pain"        Objective     Neurological Testing     Sensation     Knee   Left Knee   Intact: light touch    Right Knee   Intact: light touch     Additional Neurological Details  Neurovascular Intact    Active Range of Motion   Left Knee   Flexion: 130 degrees   Extension: 0 degrees     Right Knee   Flexion: 121 degrees with pain  Extension: 0 degrees     Strength/Myotome Testing     Left Knee   Flexion: 4  Extension: 4    Right Knee   Flexion: 4-  Extension: 4    Tests     Left Knee   Negative anterior Lachman, Apley's compression, valgus stress test at 0 degrees, valgus stress test at 30 degrees, varus stress test at 0 degrees and varus stress test at 30 degrees  Right Knee   Positive Apley's compression  Negative anterior Lachman, valgus stress test at 0 degrees, valgus stress test at 30 degrees, varus stress test at 0 degrees and varus stress test at 30 degrees         Flowsheet Rows      Most Recent Value   PT/OT G-Codes   Current Score  50   Projected Score  63   FOTO information reviewed  Yes   Assessment Type  Evaluation   G code set  Other PT/OT Primary   Other PT Primary Current Status ()  CK   Other PT Primary Goal Status ()  CH          Diagnosis:    Precautions:    Manuals        PROM        Mobs                        Exercise Diary        Recumbent Bike        Quad Sets        SLR        SL Hip ABD        Clamshells        Hamstring S        X-Walks        Leg Press                                                                                                        Modalities             CP PRN

## 2019-01-09 NOTE — PROGRESS NOTES
PT Evaluation     Today's date: 2019  Patient name: Zo Beard  : 1955  MRN: [id number]  Referring provider: Sandy Taylor MD  Dx:   Encounter Diagnosis     ICD-10-CM    1  Right knee pain, unspecified chronicity M25 561        Start Time: 1400  Stop Time: 1445  Total time in clinic (min): 45 minutes    Assessment  Assessment details: Zo Beard is a 59 y o  female who presents to the clinic with complaints of right knee pain spanning several months  Patient with increased pain during ambulation, stairs, and with transitional movements from knee extension to flexion  Patient presents with the above listed impairments  She is motivated to decrease her pain and should benefit from skilled physical therapy  Thank you for the referral       Impairments: abnormal gait, abnormal or restricted ROM, activity intolerance, impaired physical strength, lacks appropriate home exercise program, pain with function and weight-bearing intolerance  Understanding of Dx/Px/POC: good   Prognosis: good    Goals  Impairment Goals:  1  Patient will decrease complaints of pain to 4/10 at worst in 2 weeks  2  Patient will increase ROM to at least 125 knee flexion on right in 4 weeks  3  Patient will increase hip strength by 1/2 grade in 4 weeks    Functional Goals:  1  Patient will be independent with HEP by discharge  2  Patient will increase FOTO to at least 63  3  Patient will ascend/descend flight of stairs with decreased pain in 4 weeks  4  Patient will tolerate prone lying with decreased pain in 4 weeks  5    Patient will tolerate wearing shoes with a heel with decreased complaints of pain in 4 weeks      Plan  Patient would benefit from: skilled physical therapy  Planned modality interventions: TENS, thermotherapy: hydrocollator packs and cryotherapy  Planned therapy interventions: abdominal trunk stabilization, balance/weight bearing training, balance, flexibility, functional ROM exercises, graded activity, graded exercise, home exercise program, gait training, therapeutic training, therapeutic exercise, therapeutic activities, stretching, strengthening, patient education, neuromuscular re-education, muscle pump exercises, motor coordination training, Sampson taping, manual therapy, joint mobilization and massage  Frequency: 2x week  Duration in weeks: 4  Treatment plan discussed with: patient        Subjective Evaluation    History of Present Illness  Mechanism of injury: Patient reports right knee pain starting in September after walking on vacation at the beach  The patient notes she had an injection in he right knee in October with relief of symptoms  She now notes kicking a ball in December and feeling the pain resurface again  Patient had seen an ortho in October and was referred to physical therapy  Pain Location:  Right lumbar spine as well as posterior right knee   Occupation:    Prior Functional Limitations:   Independent,   AGG:  Kneeling, ascending/descenidng, sleeping prone, wearing heels  Ease:  Ice  Patient Goals:  "I just want to get rid of the pain"    Pain  Current pain ratin  At best pain ratin  At worst pain ratin  Quality: sharp and knife-like    Patient Goals  Patient goal: I just want to get rid of the pain"        Objective     Neurological Testing     Sensation     Knee   Left Knee   Intact: light touch    Right Knee   Intact: light touch     Additional Neurological Details  Neurovascular Intact    Active Range of Motion   Left Knee   Flexion: 130 degrees   Extension: 0 degrees     Right Knee   Flexion: 121 degrees with pain  Extension: 0 degrees     Strength/Myotome Testing     Left Knee   Flexion: 4  Extension: 4    Right Knee   Flexion: 4-  Extension: 4    Tests     Left Knee   Negative anterior Lachman, Apley's compression, valgus stress test at 0 degrees, valgus stress test at 30 degrees, varus stress test at 0 degrees and varus stress test at 30 degrees  Right Knee   Positive Apley's compression  Negative anterior Lachman, valgus stress test at 0 degrees, valgus stress test at 30 degrees, varus stress test at 0 degrees and varus stress test at 30 degrees         Flowsheet Rows      Most Recent Value   PT/OT G-Codes   Current Score  50   Projected Score  63   FOTO information reviewed  Yes   Assessment Type  Evaluation   G code set  Other PT/OT Primary   Other PT Primary Current Status ()  CK   Other PT Primary Goal Status ()  CH          Diagnosis:    Precautions:    Manuals        PROM        Mobs                        Exercise Diary        Recumbent Bike        Quad Sets        SLR        SL Hip ABD        Clamshells        Hamstring S        X-Walks        Leg Press                                                                                                        Modalities             CP PRN

## 2019-01-11 ENCOUNTER — OFFICE VISIT (OUTPATIENT)
Dept: PHYSICAL THERAPY | Facility: CLINIC | Age: 64
End: 2019-01-11
Payer: COMMERCIAL

## 2019-01-11 DIAGNOSIS — M25.561 RIGHT KNEE PAIN, UNSPECIFIED CHRONICITY: Primary | ICD-10-CM

## 2019-01-11 PROCEDURE — 97140 MANUAL THERAPY 1/> REGIONS: CPT | Performed by: PHYSICAL THERAPIST

## 2019-01-11 PROCEDURE — 97112 NEUROMUSCULAR REEDUCATION: CPT | Performed by: PHYSICAL THERAPIST

## 2019-01-11 PROCEDURE — 97110 THERAPEUTIC EXERCISES: CPT | Performed by: PHYSICAL THERAPIST

## 2019-01-11 NOTE — PROGRESS NOTES
Daily Note     Today's date: 2019  Patient name: Thom Feliz  : 1955  MRN: [id number]  Referring provider: Bert Kilgore MD  Dx:   Encounter Diagnosis     ICD-10-CM    1  Right knee pain, unspecified chronicity M25 561        Start Time: 759  Stop Time: 847  Total time in clinic (min): 48 minutes    Subjective:  "I did my exercises and I feel like I'm going to get better "      Objective: See treatment diary below      Assessment: Tolerated treatment well  Patient would benefit from continued PT  Patient tolerated all exercises well today without complaints of pain  Upright bike trailed today with initial complaints of discomfort, but complaints subsided with continued revolutions  Continue to challenge PREs as able  Plan: Progress treatment as tolerated        Diagnosis:    Precautions:    Manuals        PROM Hip Abductor S / Hamstring S       Mobs                        Exercise Diary        Recumbent Bike 5 min       Quad Sets 5x       SLR 3x10       SL Hip ABD 3X10       Clamshells RTB 3x10       X-Walks RTB 20;x 2 / 10' x 2       Leg Press        Bridges 3x10       Lateral Step Downs 4" 2x10                                                                                       Modalities             CP PRN

## 2019-01-14 ENCOUNTER — OFFICE VISIT (OUTPATIENT)
Dept: PHYSICAL THERAPY | Facility: CLINIC | Age: 64
End: 2019-01-14
Payer: COMMERCIAL

## 2019-01-14 DIAGNOSIS — M25.561 RIGHT KNEE PAIN, UNSPECIFIED CHRONICITY: Primary | ICD-10-CM

## 2019-01-14 PROCEDURE — 97112 NEUROMUSCULAR REEDUCATION: CPT | Performed by: PHYSICAL THERAPIST

## 2019-01-14 PROCEDURE — 97140 MANUAL THERAPY 1/> REGIONS: CPT | Performed by: PHYSICAL THERAPIST

## 2019-01-14 PROCEDURE — 97110 THERAPEUTIC EXERCISES: CPT | Performed by: PHYSICAL THERAPIST

## 2019-01-14 NOTE — PROGRESS NOTES
Daily Note     Today's date: 2019  Patient name: Xenia Morales  : 1955  MRN: [id number]  Referring provider: Adrianna Silverio MD  Dx:   Encounter Diagnosis     ICD-10-CM    1  Right knee pain, unspecified chronicity M25 561        Start Time: 813  Stop Time: 858  Total time in clinic (min): 45 minutes    Subjective:  "I still feel the same  Should I be feeling better by now?"      Objective: See treatment diary below      Assessment: Tolerated treatment well  Patient would benefit from continued PT  Patient with some complaints of knee soreness underneath of the patella as well as lateral right knee  Decreased complaints of pain with manuals  Increased PREs without any complaints of pain  Continue to progress PREs as able  Plan: Progress treatment as tolerated  Diagnosis:    Precautions:    Manuals       PROM Hip Abductor S / Hamstring S Hip Abductor S       Mobs  Med/Lat   Inf/Sup Patella       IASTM  R IT band / lateral R knee               Exercise Diary        Recumbent Bike 5 min       Quad Sets 5x       SLR 3x10 2# 3x10 2 5# NV     SL Hip ABD 3X10 2# 3x10      Clamshells RTB 3x10 GTB 3x10      X-Walks RTB 20;x 2 / 10' x 2 RTB 20' x 2 / 10' x 2      Leg Press  70# 3x10      Bridges 3x10 Ball 2x10 / 23x10 hamstring      Lateral Step Downs 4" 2x10                                                                                       Modalities             CP PRN

## 2019-01-18 ENCOUNTER — APPOINTMENT (OUTPATIENT)
Dept: PHYSICAL THERAPY | Facility: CLINIC | Age: 64
End: 2019-01-18
Payer: COMMERCIAL

## 2019-01-21 ENCOUNTER — APPOINTMENT (OUTPATIENT)
Dept: PHYSICAL THERAPY | Facility: CLINIC | Age: 64
End: 2019-01-21
Payer: COMMERCIAL

## 2019-01-23 ENCOUNTER — OFFICE VISIT (OUTPATIENT)
Dept: INTERNAL MEDICINE CLINIC | Facility: CLINIC | Age: 64
End: 2019-01-23
Payer: COMMERCIAL

## 2019-01-23 VITALS
OXYGEN SATURATION: 95 % | HEART RATE: 77 BPM | DIASTOLIC BLOOD PRESSURE: 76 MMHG | SYSTOLIC BLOOD PRESSURE: 120 MMHG | HEIGHT: 67 IN

## 2019-01-23 DIAGNOSIS — E78.5 BORDERLINE HYPERLIPIDEMIA: ICD-10-CM

## 2019-01-23 DIAGNOSIS — H53.9 VISION CHANGES: Primary | ICD-10-CM

## 2019-01-23 DIAGNOSIS — M25.561 ARTHRALGIA OF RIGHT KNEE: ICD-10-CM

## 2019-01-23 DIAGNOSIS — R53.83 FATIGUE, UNSPECIFIED TYPE: ICD-10-CM

## 2019-01-23 DIAGNOSIS — G47.00 INSOMNIA, UNSPECIFIED TYPE: ICD-10-CM

## 2019-01-23 PROCEDURE — 1036F TOBACCO NON-USER: CPT | Performed by: INTERNAL MEDICINE

## 2019-01-23 PROCEDURE — 99214 OFFICE O/P EST MOD 30 MIN: CPT | Performed by: INTERNAL MEDICINE

## 2019-01-23 RX ORDER — ZOLPIDEM TARTRATE 10 MG/1
10 TABLET ORAL
Qty: 30 TABLET | Refills: 0 | Status: SHIPPED | OUTPATIENT
Start: 2019-01-23 | End: 2019-08-23 | Stop reason: SDUPTHER

## 2019-01-23 NOTE — PROGRESS NOTES
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  She was advised to get a carotid O ultrasound which will be scheduled  Regarding her blood work to be repeated she is going to do that along with a cholesterol profile  The knee pain and right leg pain will be followed in [place]  I will see her back here in 2 months unless any other symptoms developed  No results found for this or any previous visit (from the past 1008 hour(s))  1  Vision changes  VAS carotid complete study   2  Insomnia, unspecified type  zolpidem (AMBIEN) 10 mg tablet   3  Arthralgia of right knee  Anti-DNA antibody, double-stranded   4  Borderline hyperlipidemia  Lipid panel   5  Fatigue, unspecified type  Cortisol Level, AM Specimen       Orders Placed This Encounter   Procedures    Anti-DNA antibody, double-stranded    Cortisol Level, AM Specimen    Lipid panel         Subjective:  Generally she feels better  She is not is fatigue  She does have some specific complaints which is right knee pain and back pain with radiation down her leg  She has followed in [place] for the right knee pain and is to go back there in a week or 2  The right leg pain is fairly new and is going to discuss it with her orthopedist     She got her cortisol repeated as well as an anti-double-stranded DNA but they lost the specimen apparently  She is going to repeat that     Patient ID: Vita Batista is a 59 y o  female  HPI    The following portions of the patient's history were reviewed and updated as appropriate:   She has no past medical history on file  ,   does not have a problem list on file  ,   has no past surgical history on file  ,  family history includes Celiac disease in her child and father; Other in her family and father  ,   reports that she has never smoked  She has never used smokeless tobacco  She reports that she does not drink alcohol or use drugs  ,  has No Known Allergies       Current Outpatient Prescriptions:     calcium carbonate (OS-RAYMUNDO) 600 MG tablet, Take 600 mg by mouth 2 (two) times a day with meals, Disp: , Rfl:     glucosamine-chondroitin 500-400 MG tablet, Take 1 tablet by mouth daily, Disp: , Rfl:     LORazepam (ATIVAN) 0 5 mg tablet, Take 1 tablet (0 5 mg total) by mouth every 8 (eight) hours as needed for anxiety, Disp: 30 tablet, Rfl: 0    zolpidem (AMBIEN) 10 mg tablet, Take 1 tablet (10 mg total) by mouth daily at bedtime as needed for sleep, Disp: 30 tablet, Rfl: 0    mupirocin (BACTROBAN) 2 % ointment, , Disp: , Rfl: 0    Review of Systems   Constitutional: Positive for fatigue  Negative for activity change, appetite change, chills, diaphoresis, fever and unexpected weight change  HENT: Negative for congestion, ear pain, hearing loss, mouth sores, nosebleeds, postnasal drip, sinus pain, sinus pressure, sore throat and trouble swallowing  Eyes: Positive for visual disturbance  Negative for pain and discharge  She has developed partially detached retinas and was advised by her ophthalmologist to get a carotid ultrasound  Respiratory: Negative for apnea, cough, chest tightness, shortness of breath and wheezing  Cardiovascular: Negative for chest pain, palpitations and leg swelling  Gastrointestinal: Negative for abdominal pain, anal bleeding, blood in stool, constipation, diarrhea, nausea and vomiting  Endocrine: Negative for polydipsia and polyphagia  Genitourinary: Negative for decreased urine volume, dysuria, flank pain, frequency, hematuria and urgency  Musculoskeletal: Positive for arthralgias, back pain and joint swelling  Negative for gait problem and myalgias  Skin: Negative for rash and wound  Allergic/Immunologic: Negative for environmental allergies and food allergies  Neurological: Negative for dizziness, tremors, seizures, syncope, speech difficulty, light-headedness, numbness and headaches  Hematological: Negative for adenopathy  Does not bruise/bleed easily  Psychiatric/Behavioral: Negative for agitation, confusion, hallucinations, sleep disturbance and suicidal ideas  The patient is not nervous/anxious  Objective:  /76 (BP Location: Left arm, Patient Position: Sitting)   Pulse 77   Ht 5' 7" (1 702 m)   SpO2 95%      Physical Exam   Constitutional: She appears well-developed and well-nourished  No distress  Blood pressure is 110/80  Rhythm is regular  Rate is 80  HENT:   Head: Normocephalic  Right Ear: External ear normal    Left Ear: External ear normal    Nose: Nose normal    Mouth/Throat: Oropharynx is clear and moist  No oropharyngeal exudate  Eyes: Pupils are equal, round, and reactive to light  Conjunctivae and EOM are normal  Right eye exhibits no discharge  Left eye exhibits no discharge  Neck: Normal range of motion  Neck supple  No thyromegaly present  Cardiovascular: Normal rate, regular rhythm, normal heart sounds and intact distal pulses  Exam reveals no gallop and no friction rub  No murmur heard  Pulmonary/Chest: Effort normal and breath sounds normal  No respiratory distress  She has no wheezes  She has no rales  Abdominal: Soft  Bowel sounds are normal  She exhibits no distension and no mass  There is no tenderness  There is no rebound and no guarding  Musculoskeletal: Normal range of motion  She exhibits no edema, tenderness or deformity  Lymphadenopathy:     She has no cervical adenopathy  Neurological: She is alert  She has normal reflexes  No cranial nerve deficit  Coordination normal    Skin: Skin is warm and dry  No rash noted  No erythema  Psychiatric: She has a normal mood and affect  Her behavior is normal  Judgment and thought content normal    Nursing note and vitals reviewed

## 2019-01-25 ENCOUNTER — OFFICE VISIT (OUTPATIENT)
Dept: PHYSICAL THERAPY | Facility: CLINIC | Age: 64
End: 2019-01-25
Payer: COMMERCIAL

## 2019-01-25 ENCOUNTER — TELEPHONE (OUTPATIENT)
Dept: INTERNAL MEDICINE CLINIC | Facility: CLINIC | Age: 64
End: 2019-01-25

## 2019-01-25 DIAGNOSIS — M25.561 RIGHT KNEE PAIN, UNSPECIFIED CHRONICITY: Primary | ICD-10-CM

## 2019-01-25 PROCEDURE — 97140 MANUAL THERAPY 1/> REGIONS: CPT

## 2019-01-25 PROCEDURE — 97110 THERAPEUTIC EXERCISES: CPT

## 2019-01-25 NOTE — TELEPHONE ENCOUNTER
Pt cld stating that she spoke with [facility] and they are going to resend Dr Jazmine Tavera the results  She stated that they had them under a  of 60 instead of her correct    RODRI

## 2019-01-25 NOTE — PROGRESS NOTES
Daily Note     Today's date: 2019  Patient name: Thom Feliz  : 1955  MRN: [id number]  Referring provider: Bert Kilgore MD  Dx:   Encounter Diagnosis     ICD-10-CM    1  Right knee pain, unspecified chronicity M25 561                   Subjective: Patient reports, 'I just had my massage, and I feel great  I don't have any pain at all"  She reports that she has a follow up with her MD on Wednesday  Objective: See treatment diary below      Assessment:  Patient arrived 13 minutes late to today's session; she was accommodated  Tolerated treatment well  Patient exhibited good technique with therapeutic exercises  All exercises were performed pain free today and she was able to perform all progressions  Continue to progress and challenge patient to tolerance  Plan: Continue per plan of care  Diagnosis:    Precautions:    Manuals      PROM Hip Abductor S / Hamstring S Hip Abductor S  Hip Abductor and quad stretch - RO, PTA      Mobs  Med/Lat   Inf/Sup Patella       IASTM  R IT band / lateral R knee               Exercise Diary        Recumbent Bike 5 min   upright- 5 mins      Quad Sets 5x       SLR 3x10 2# 3x10 2 5# 3x10      SL Hip ABD 3X10 2# 3x10  2 5#, 3x10      Clamshells RTB 3x10 GTB 3x10  GTB, 3x10     X-Walks RTB 20;x 2 / 10' x 2 RTB 20' x 2 / 10' x 2 Blue, 1 5 laps at blue line      Leg Press  70# 3x10  70#, 3x10      Bridges 3x10 Ball 2x10 / 23x10 hamstring Knees straight, bent and curls: 10x ea     Lateral Step Downs 4" 2x10  6 in, 2x10                                                                                      Modalities             CP PRN

## 2019-01-28 ENCOUNTER — TELEPHONE (OUTPATIENT)
Dept: INTERNAL MEDICINE CLINIC | Facility: CLINIC | Age: 64
End: 2019-01-28

## 2019-01-28 NOTE — TELEPHONE ENCOUNTER
Results given to patient  She will need a repeat on her anti double stranded DNA in a couple of months

## 2019-02-04 ENCOUNTER — OFFICE VISIT (OUTPATIENT)
Dept: PHYSICAL THERAPY | Facility: CLINIC | Age: 64
End: 2019-02-04
Payer: COMMERCIAL

## 2019-02-04 DIAGNOSIS — M25.561 RIGHT KNEE PAIN, UNSPECIFIED CHRONICITY: Primary | ICD-10-CM

## 2019-02-04 PROCEDURE — 97110 THERAPEUTIC EXERCISES: CPT

## 2019-02-04 PROCEDURE — 97112 NEUROMUSCULAR REEDUCATION: CPT

## 2019-02-04 PROCEDURE — 97140 MANUAL THERAPY 1/> REGIONS: CPT

## 2019-02-04 NOTE — PROGRESS NOTES
Daily Note     Today's date: 2019  Patient name: Koko Shepard  : 1955  MRN: [id number]  Referring provider: Wicho Brunson MD  Dx:   Encounter Diagnosis     ICD-10-CM    1  Right knee pain, unspecified chronicity M25 561                   Subjective: Patient reports, "I have been feeling pretty good  I was able to walk in the city yesterday from 11-6, and I was okay  I do my exercises everyday  My pain is worse at night, but my doctor talked about hyaluronic acid injections"  Objective: See treatment diary below      Assessment: Tolerated treatment well  Patient exhibited good technique with therapeutic exercises  Patient performed all exercises pain free today  She reported feeling "much better" after manual therapy  She was educated on performing exercises at home and was given an updated HEP  Plan: Continue per plan of care  Diagnosis:    Precautions:    Manuals   2/    PROM Hip Abductor S / Hamstring S Hip Abductor S  Hip Abductor and quad stretch - RO, PTA   Hip adductor  and ITB stretch- RO, PTA     Mobs  Med/Lat   Inf/Sup Patella       IASTM  R IT band / lateral R knee               Exercise Diary        Recumbent Bike 5 min   upright- 5 mins   upright- 5 mins     Quad Sets 5x       SLR 3x10 2# 3x10 2 5# 3x10  2 5# 3x10     SL Hip ABD 3X10 2# 3x10  2 5#, 3x10  2 5# 3x10     Clamshells RTB 3x10 GTB 3x10  GTB, 3x10 BTB, 3x10    X-Walks RTB 20;x 2 / 10' x 2 RTB 20' x 2 / 10' x 2 Blue, 1 5 laps at blue [employer], 2 laps at [employer]    Leg Press  70# 3x10  70#, 3x10   70#, 3x10     Bridges 3x10 Ball 2x10 / 23x10 hamstring Knees straight, bent and curls: 10x ea Knees straight, bent and curls: 10x ea    Lateral Step Downs 4" 2x10  6 in, 2x10   8 in, 2x10     Squats     2x10     ITB stretch      3x30 sec    Adductor stretch     3x30 sec                                                            Modalities             CP PRN

## 2019-02-28 ENCOUNTER — TELEPHONE (OUTPATIENT)
Dept: INTERNAL MEDICINE CLINIC | Facility: CLINIC | Age: 64
End: 2019-02-28

## 2019-02-28 DIAGNOSIS — H53.9 VISUAL CHANGES: Primary | ICD-10-CM

## 2019-02-28 NOTE — TELEPHONE ENCOUNTER
Patient would like to speak with you about the Lyme Disease Test she had done in November of last year  She is having an issue with the retinas in both eyes and the ophthalmologist suggested she might need to be retested for the disease   Patient can be reached at [phone]

## 2019-03-06 NOTE — PROGRESS NOTES
PT Discharge    Today's date: 3/6/2019  Patient name: Eduard Ace  : 1955  MRN: [id number]  Referring provider: Stephen De La Torre MD  Dx:   Encounter Diagnosis     ICD-10-CM    1  Right knee pain, unspecified chronicity M25 561      Patient has been contacted multiple times to schedule follow-up visits  The patient hasn't returned any phone calls  At this time, she will be discharged        Assessment/Plan    Subjective    Objective

## 2019-03-18 ENCOUNTER — TELEPHONE (OUTPATIENT)
Dept: INTERNAL MEDICINE CLINIC | Facility: CLINIC | Age: 64
End: 2019-03-18

## 2019-03-18 LAB
B BURGDOR AB SER IA-ACNC: <0.9 INDEX
CHOLEST SERPL-MCNC: 204 MG/DL
CHOLEST/HDLC SERPL: 3 (CALC)
DSDNA AB SER-ACNC: 5 IU/ML
HDLC SERPL-MCNC: 67 MG/DL
LDLC SERPL CALC-MCNC: 122 MG/DL (CALC)
NONHDLC SERPL-MCNC: 137 MG/DL (CALC)
TRIGL SERPL-MCNC: 48 MG/DL

## 2019-03-18 NOTE — TELEPHONE ENCOUNTER
Pt called for blood work results, she had them done at [facility] in [place] on 3/4/19, did not see results in chart       [phone]

## 2019-03-20 ENCOUNTER — TELEPHONE (OUTPATIENT)
Dept: INTERNAL MEDICINE CLINIC | Facility: CLINIC | Age: 64
End: 2019-03-20

## 2019-03-21 NOTE — TELEPHONE ENCOUNTER
I talked to the patient  I gave her the results  She is going to see me back here in 2 weeks    She still does not feel very well

## 2019-05-31 ENCOUNTER — OFFICE VISIT (OUTPATIENT)
Dept: INTERNAL MEDICINE CLINIC | Facility: CLINIC | Age: 64
End: 2019-05-31
Payer: COMMERCIAL

## 2019-05-31 VITALS
SYSTOLIC BLOOD PRESSURE: 110 MMHG | OXYGEN SATURATION: 98 % | HEART RATE: 96 BPM | DIASTOLIC BLOOD PRESSURE: 72 MMHG | HEIGHT: 67 IN

## 2019-05-31 DIAGNOSIS — J06.9 VIRAL URI: Primary | ICD-10-CM

## 2019-05-31 DIAGNOSIS — Z12.11 SCREENING FOR COLON CANCER: ICD-10-CM

## 2019-05-31 PROCEDURE — 99213 OFFICE O/P EST LOW 20 MIN: CPT | Performed by: INTERNAL MEDICINE

## 2019-05-31 PROCEDURE — 1036F TOBACCO NON-USER: CPT | Performed by: INTERNAL MEDICINE

## 2019-05-31 RX ORDER — PROMETHAZINE HYDROCHLORIDE AND CODEINE PHOSPHATE 6.25; 1 MG/5ML; MG/5ML
5 SYRUP ORAL EVERY 4 HOURS PRN
Qty: 240 ML | Refills: 0 | Status: SHIPPED | OUTPATIENT
Start: 2019-05-31 | End: 2019-11-22 | Stop reason: CLARIF

## 2019-06-04 ENCOUNTER — TELEPHONE (OUTPATIENT)
Dept: INTERNAL MEDICINE CLINIC | Facility: CLINIC | Age: 64
End: 2019-06-04

## 2019-07-09 DIAGNOSIS — F41.9 ANXIETY: ICD-10-CM

## 2019-07-09 RX ORDER — LORAZEPAM 0.5 MG/1
0.5 TABLET ORAL EVERY 8 HOURS PRN
Qty: 30 TABLET | Refills: 0 | Status: SHIPPED | OUTPATIENT
Start: 2019-07-09 | End: 2019-08-23 | Stop reason: SDUPTHER

## 2019-07-09 NOTE — TELEPHONE ENCOUNTER
Message for Dr Joy Faria:    Ruby Im find bottle of LORazepam, 0 5 mg tablet  Gets filled every 5 yrs    [facility] [place]    Can Dr Joy Faira please fill this?     Call her at this p# w/any questions:  [phone]

## 2019-07-18 ENCOUNTER — TELEPHONE (OUTPATIENT)
Dept: INTERNAL MEDICINE CLINIC | Facility: CLINIC | Age: 64
End: 2019-07-18

## 2019-07-18 DIAGNOSIS — Z23 NEED FOR DIPHTHERIA-TETANUS-PERTUSSIS (TDAP) VACCINE: Primary | ICD-10-CM

## 2019-07-18 NOTE — TELEPHONE ENCOUNTER
DAVE BRIAN   DAUGHTER  IS  HAVING  A  BABY  NEXT  WEEK   NEEDS  WHOOPING   COUGH  CAN  SHE GET  IT  THIS  WEEK    CALL PT [phone]

## 2019-07-22 ENCOUNTER — CLINICAL SUPPORT (OUTPATIENT)
Dept: INTERNAL MEDICINE CLINIC | Facility: CLINIC | Age: 64
End: 2019-07-22
Payer: COMMERCIAL

## 2019-07-22 DIAGNOSIS — Z23 NEED FOR TDAP VACCINATION: Primary | ICD-10-CM

## 2019-07-22 PROCEDURE — 90715 TDAP VACCINE 7 YRS/> IM: CPT

## 2019-07-22 PROCEDURE — 90471 IMMUNIZATION ADMIN: CPT

## 2019-08-23 DIAGNOSIS — G47.00 INSOMNIA, UNSPECIFIED TYPE: ICD-10-CM

## 2019-08-23 DIAGNOSIS — F41.9 ANXIETY: ICD-10-CM

## 2019-08-23 RX ORDER — ZOLPIDEM TARTRATE 10 MG/1
10 TABLET ORAL
Qty: 30 TABLET | Refills: 0 | Status: SHIPPED | OUTPATIENT
Start: 2019-08-23 | End: 2020-09-30 | Stop reason: SDUPTHER

## 2019-08-23 RX ORDER — LORAZEPAM 0.5 MG/1
0.5 TABLET ORAL EVERY 8 HOURS PRN
Qty: 30 TABLET | Refills: 0 | Status: SHIPPED | OUTPATIENT
Start: 2019-08-23 | End: 2020-01-27 | Stop reason: SDUPTHER

## 2019-08-23 NOTE — TELEPHONE ENCOUNTER
LORazepam (ATIVAN) 0 5MG Tablets  zolpidem (AMBIEN) 10 MG TABLETS  Pt needs for Both 30  tablets      Pt [facility] [address] [facility] stephen LEONARD      pt phone [phone]

## 2019-11-22 ENCOUNTER — APPOINTMENT (OUTPATIENT)
Dept: LAB | Facility: CLINIC | Age: 64
End: 2019-11-22
Payer: COMMERCIAL

## 2019-11-22 ENCOUNTER — OFFICE VISIT (OUTPATIENT)
Dept: INTERNAL MEDICINE CLINIC | Facility: CLINIC | Age: 64
End: 2019-11-22
Payer: COMMERCIAL

## 2019-11-22 ENCOUNTER — HOSPITAL ENCOUNTER (OUTPATIENT)
Dept: ULTRASOUND IMAGING | Facility: HOSPITAL | Age: 64
Discharge: HOME | End: 2019-11-22
Attending: INTERNAL MEDICINE
Payer: COMMERCIAL

## 2019-11-22 VITALS
SYSTOLIC BLOOD PRESSURE: 128 MMHG | HEIGHT: 67 IN | HEART RATE: 82 BPM | OXYGEN SATURATION: 97 % | DIASTOLIC BLOOD PRESSURE: 80 MMHG

## 2019-11-22 DIAGNOSIS — Z12.11 SCREENING FOR COLON CANCER: ICD-10-CM

## 2019-11-22 DIAGNOSIS — R53.82 CHRONIC FATIGUE: ICD-10-CM

## 2019-11-22 DIAGNOSIS — E04.1 THYROID NODULE: ICD-10-CM

## 2019-11-22 DIAGNOSIS — E78.5 BORDERLINE HYPERLIPIDEMIA: ICD-10-CM

## 2019-11-22 DIAGNOSIS — Z23 ENCOUNTER FOR IMMUNIZATION: Primary | ICD-10-CM

## 2019-11-22 LAB
ALBUMIN SERPL BCP-MCNC: 3.9 G/DL (ref 3.5–5)
ALP SERPL-CCNC: 88 U/L (ref 46–116)
ALT SERPL W P-5'-P-CCNC: 22 U/L (ref 12–78)
ANION GAP SERPL CALCULATED.3IONS-SCNC: 5 MMOL/L (ref 4–13)
AST SERPL W P-5'-P-CCNC: 18 U/L (ref 5–45)
BASOPHILS # BLD AUTO: 0.06 THOUSANDS/ΜL (ref 0–0.1)
BASOPHILS NFR BLD AUTO: 1 % (ref 0–1)
BILIRUB SERPL-MCNC: 0.55 MG/DL (ref 0.2–1)
BILIRUB UR QL STRIP: NEGATIVE
BUN SERPL-MCNC: 17 MG/DL (ref 5–25)
CALCIUM SERPL-MCNC: 9.4 MG/DL (ref 8.3–10.1)
CHLORIDE SERPL-SCNC: 104 MMOL/L (ref 100–108)
CHOLEST SERPL-MCNC: 202 MG/DL (ref 50–200)
CLARITY UR: CLEAR
CO2 SERPL-SCNC: 28 MMOL/L (ref 21–32)
COLOR UR: YELLOW
CREAT SERPL-MCNC: 0.74 MG/DL (ref 0.6–1.3)
EOSINOPHIL # BLD AUTO: 0.21 THOUSAND/ΜL (ref 0–0.61)
EOSINOPHIL NFR BLD AUTO: 4 % (ref 0–6)
ERYTHROCYTE [DISTWIDTH] IN BLOOD BY AUTOMATED COUNT: 13.1 % (ref 11.6–15.1)
GFR SERPL CREATININE-BSD FRML MDRD: 86 ML/MIN/1.73SQ M
GLUCOSE P FAST SERPL-MCNC: 86 MG/DL (ref 65–99)
GLUCOSE UR STRIP-MCNC: NEGATIVE MG/DL
HCT VFR BLD AUTO: 38.9 % (ref 34.8–46.1)
HDLC SERPL-MCNC: 72 MG/DL
HGB BLD-MCNC: 12.3 G/DL (ref 11.5–15.4)
HGB UR QL STRIP.AUTO: NEGATIVE
IMM GRANULOCYTES # BLD AUTO: 0.02 THOUSAND/UL (ref 0–0.2)
IMM GRANULOCYTES NFR BLD AUTO: 0 % (ref 0–2)
KETONES UR STRIP-MCNC: NEGATIVE MG/DL
LDLC SERPL CALC-MCNC: 119 MG/DL (ref 0–100)
LEUKOCYTE ESTERASE UR QL STRIP: NEGATIVE
LYMPHOCYTES # BLD AUTO: 1.54 THOUSANDS/ΜL (ref 0.6–4.47)
LYMPHOCYTES NFR BLD AUTO: 30 % (ref 14–44)
MCH RBC QN AUTO: 29.8 PG (ref 26.8–34.3)
MCHC RBC AUTO-ENTMCNC: 31.6 G/DL (ref 31.4–37.4)
MCV RBC AUTO: 94 FL (ref 82–98)
MONOCYTES # BLD AUTO: 0.48 THOUSAND/ΜL (ref 0.17–1.22)
MONOCYTES NFR BLD AUTO: 9 % (ref 4–12)
NEUTROPHILS # BLD AUTO: 2.87 THOUSANDS/ΜL (ref 1.85–7.62)
NEUTS SEG NFR BLD AUTO: 56 % (ref 43–75)
NITRITE UR QL STRIP: NEGATIVE
NONHDLC SERPL-MCNC: 130 MG/DL
NRBC BLD AUTO-RTO: 0 /100 WBCS
PH UR STRIP.AUTO: 6.5 [PH]
PLATELET # BLD AUTO: 260 THOUSANDS/UL (ref 149–390)
PMV BLD AUTO: 10 FL (ref 8.9–12.7)
POTASSIUM SERPL-SCNC: 4.6 MMOL/L (ref 3.5–5.3)
PROT SERPL-MCNC: 7.4 G/DL (ref 6.4–8.2)
PROT UR STRIP-MCNC: NEGATIVE MG/DL
RBC # BLD AUTO: 4.13 MILLION/UL (ref 3.81–5.12)
SODIUM SERPL-SCNC: 137 MMOL/L (ref 136–145)
SP GR UR STRIP.AUTO: 1.01 (ref 1–1.03)
T4 FREE SERPL-MCNC: 0.98 NG/DL (ref 0.76–1.46)
TRIGL SERPL-MCNC: 57 MG/DL
TSH SERPL DL<=0.05 MIU/L-ACNC: 3.58 UIU/ML (ref 0.36–3.74)
UROBILINOGEN UR QL STRIP.AUTO: 0.2 E.U./DL
WBC # BLD AUTO: 5.18 THOUSAND/UL (ref 4.31–10.16)

## 2019-11-22 PROCEDURE — 99396 PREV VISIT EST AGE 40-64: CPT | Performed by: INTERNAL MEDICINE

## 2019-11-22 PROCEDURE — 80053 COMPREHEN METABOLIC PANEL: CPT

## 2019-11-22 PROCEDURE — 81003 URINALYSIS AUTO W/O SCOPE: CPT | Performed by: INTERNAL MEDICINE

## 2019-11-22 PROCEDURE — 85025 COMPLETE CBC W/AUTO DIFF WBC: CPT

## 2019-11-22 PROCEDURE — 84439 ASSAY OF FREE THYROXINE: CPT

## 2019-11-22 PROCEDURE — 76536 US EXAM OF HEAD AND NECK: CPT

## 2019-11-22 PROCEDURE — 90682 RIV4 VACC RECOMBINANT DNA IM: CPT | Performed by: INTERNAL MEDICINE

## 2019-11-22 PROCEDURE — 84443 ASSAY THYROID STIM HORMONE: CPT

## 2019-11-22 PROCEDURE — 80061 LIPID PANEL: CPT

## 2019-11-22 PROCEDURE — 90471 IMMUNIZATION ADMIN: CPT | Performed by: INTERNAL MEDICINE

## 2019-11-22 PROCEDURE — 36415 COLL VENOUS BLD VENIPUNCTURE: CPT

## 2019-11-22 NOTE — PROGRESS NOTES
Assessment/Plan:  Regarding her fatigue she is going to have all blood work done  She needs to start exercising  She is very busy but does not exercise aerobically  That will help her lose weight as well       Regarding the questionable thyroid nodule she will have an ultrasound  She needs to get a colonoscopy  She needs to get a skin checkup  She needs to eat more healthy  She will get the thyroid ultrasound check her labs and have me return in 1 month to recheck her  The importance of getting ultrasound and her colonoscopy was stressed to the patient  1  Encounter for immunization  influenza vaccine, [id number], quadrivalent, recombinant, PF, 0 5 mL, for patients 18 yr+ (FLUBLOK)   2  Borderline hyperlipidemia  Lipid panel   3  Thyroid nodule  US thyroid   4  Chronic fatigue  CBC and differential    Comprehensive metabolic panel    UA (URINE) with reflex to Scope    T4, free    TSH, 3rd generation   5  Screening for colon cancer  Ambulatory referral to Gastroenterology       Orders Placed This Encounter   Procedures    US thyroid    influenza vaccine, [id number], quadrivalent, recombinant, PF, 0 5 mL, for patients 18 yr+ (FLUBLOK)    CBC and differential    Comprehensive metabolic panel    Lipid panel    UA (URINE) with reflex to Scope    T4, free    TSH, 3rd generation    Ambulatory referral to Gastroenterology         Subjective:  She comes in for physical          Patient ID: Maryan Subramanian is a 62 y o  female  HPI she is basically healthy woman who is only real problem is his periodic anxiety  Also has difficulty sleeping on occasion  She travels a lot when she goes over she she has difficulty falling asleep  She takes care of to parents which are stressful for her  Otherwise she is in good health  She does not smoke and does not drink  She does get ocular migraines and is followed by her eye doctor for that    She does have fatigue which is been problematic for her     She is not exercising  She never did her colonoscopy as instructed  She is up-to-date on mammograms and pelvic exams  The following portions of the patient's history were reviewed and updated as appropriate:   She has no past medical history on file  ,  does not have any pertinent problems on file  ,   has no past surgical history on file  ,  family history includes Celiac disease in her child and father; Other in her family and father  ,   reports that she has never smoked  She has never used smokeless tobacco  She reports that she does not drink alcohol or use drugs  ,  has No Known Allergies       Current Outpatient Medications:     calcium carbonate (OS-RAYMUNDO) 600 MG tablet, Take 600 mg by mouth 2 (two) times a day with meals, Disp: , Rfl:     glucosamine-chondroitin 500-400 MG tablet, Take 1 tablet by mouth daily, Disp: , Rfl:     LORazepam (ATIVAN) 0 5 mg tablet, Take 1 tablet (0 5 mg total) by mouth every 8 (eight) hours as needed for anxiety, Disp: 30 tablet, Rfl: 0    zolpidem (AMBIEN) 10 mg tablet, Take 1 tablet (10 mg total) by mouth daily at bedtime as needed for sleep, Disp: 30 tablet, Rfl: 0    Review of Systems   Constitutional: Positive for fatigue  Negative for activity change, appetite change, chills, diaphoresis, fever and unexpected weight change  HENT: Negative for congestion, ear pain, hearing loss, mouth sores, nosebleeds, postnasal drip, sinus pressure, sinus pain, sore throat and trouble swallowing  Eyes: Negative for pain, discharge and visual disturbance  Respiratory: Negative for apnea, cough, chest tightness, shortness of breath and wheezing  Cardiovascular: Negative for chest pain, palpitations and leg swelling  Gastrointestinal: Negative for abdominal pain, anal bleeding, blood in stool, constipation, diarrhea, nausea and vomiting  Endocrine: Negative for polydipsia and polyphagia     Genitourinary: Negative for decreased urine volume, dysuria, flank pain, frequency, hematuria and urgency  Musculoskeletal: Positive for arthralgias  Negative for back pain, gait problem, joint swelling and myalgias  She has bilateral knee arthritis   Skin: Negative for rash and wound  Allergic/Immunologic: Negative for environmental allergies and food allergies  Neurological: Negative for dizziness, tremors, seizures, syncope, speech difficulty, light-headedness, numbness and headaches  Hematological: Negative for adenopathy  Does not bruise/bleed easily  Psychiatric/Behavioral: Negative for agitation, confusion, hallucinations, sleep disturbance and suicidal ideas  The patient is not nervous/anxious  She has some insomnia and occasional anxiety taking care of her parents  Objective:  /80 (BP Location: Left arm, Patient Position: Sitting, Cuff Size: Standard)   Pulse 82   Ht 5' 7" (1 702 m)   SpO2 97%      Physical Exam   Constitutional: She appears well-developed  No distress  She is moderately overweight  Blood pressure is 128/80  Pulse ox is 97  Rhythm is regular  Rate is 82  HENT:   Head: Normocephalic  Right Ear: External ear normal    Left Ear: External ear normal    Nose: Nose normal    Mouth/Throat: Oropharynx is clear and moist  No oropharyngeal exudate  Eyes: Pupils are equal, round, and reactive to light  Conjunctivae and EOM are normal  Right eye exhibits no discharge  Left eye exhibits no discharge  Neck: Normal range of motion  Neck supple  No thyromegaly present  I believe there is a small nodule left lobe of the thyroid gland  No other adenopathy  Cardiovascular: Normal rate, regular rhythm, normal heart sounds and intact distal pulses  Exam reveals no gallop and no friction rub  No murmur heard  Pulmonary/Chest: Effort normal and breath sounds normal  No respiratory distress  She has no wheezes  She has no rales  Abdominal: Soft  Bowel sounds are normal  She exhibits no distension and no mass   There is no tenderness  There is no rebound and no guarding  Musculoskeletal: Normal range of motion  She exhibits no edema, tenderness or deformity  Lymphadenopathy:     She has no cervical adenopathy  Neurological: She is alert  She has normal reflexes  She displays normal reflexes  No cranial nerve deficit  Coordination normal    Skin: Skin is warm and dry  No rash noted  No erythema  Psychiatric: She has a normal mood and affect  Her behavior is normal  Judgment and thought content normal    Nursing note and vitals reviewed  No results found for this or any previous visit (from the past 1008 hour(s))

## 2019-11-25 ENCOUNTER — TELEPHONE (OUTPATIENT)
Dept: INTERNAL MEDICINE CLINIC | Facility: CLINIC | Age: 64
End: 2019-11-25

## 2019-11-25 NOTE — TELEPHONE ENCOUNTER
US Thyroid not resulted yet    Called Radiology & Spoke with Bev Lan, going to give to Radiologist to have read

## 2019-11-25 NOTE — TELEPHONE ENCOUNTER
Pt called she completed lab work and an ultrasound of the thyroid last week  Pt wants the results for them  Please call the patient when results have been read by Dr Elías Swartz [phone]

## 2019-11-25 NOTE — TELEPHONE ENCOUNTER
NELSON FROM [facility] CALLED AND SAID THERE ARE SIGNIFICANT FINDLINGS ON THE ULTRASOUND OF THE THYROID

## 2020-01-27 DIAGNOSIS — F41.9 ANXIETY: ICD-10-CM

## 2020-01-27 NOTE — TELEPHONE ENCOUNTER
RX  REFILL    LORAZEPAM  0 5    [facility]    PT  GOING  TO  [place]  TOMORROW  FOR  2 WKS NEEDS  RX  TODAY          ANY  Yamileth Chloémaandria  CALL PT [phone]

## 2020-01-28 RX ORDER — LORAZEPAM 0.5 MG/1
0.5 TABLET ORAL EVERY 8 HOURS PRN
Qty: 30 TABLET | Refills: 0 | Status: SHIPPED | OUTPATIENT
Start: 2020-01-28 | End: 2020-09-30 | Stop reason: SDUPTHER

## 2020-02-26 ENCOUNTER — OFFICE VISIT (OUTPATIENT)
Dept: INTERNAL MEDICINE CLINIC | Facility: CLINIC | Age: 65
End: 2020-02-26
Payer: COMMERCIAL

## 2020-02-26 VITALS
SYSTOLIC BLOOD PRESSURE: 124 MMHG | HEIGHT: 67 IN | HEART RATE: 75 BPM | OXYGEN SATURATION: 97 % | DIASTOLIC BLOOD PRESSURE: 70 MMHG

## 2020-02-26 DIAGNOSIS — E04.1 THYROID NODULE: ICD-10-CM

## 2020-02-26 DIAGNOSIS — R53.82 CHRONIC FATIGUE: Primary | ICD-10-CM

## 2020-02-26 DIAGNOSIS — E78.5 BORDERLINE HYPERLIPIDEMIA: ICD-10-CM

## 2020-02-26 PROCEDURE — 1036F TOBACCO NON-USER: CPT | Performed by: INTERNAL MEDICINE

## 2020-02-26 PROCEDURE — 99214 OFFICE O/P EST MOD 30 MIN: CPT | Performed by: INTERNAL MEDICINE

## 2020-02-26 NOTE — PROGRESS NOTES
Assessment/Plan:  Her exam is normal   Her labs were stable  I am going recommend that she see her gynecologist to double check her pelvis  I will see her in 6 months  1  Chronic fatigue     2  Borderline hyperlipidemia     3  Thyroid nodule         No orders of the defined types were placed in this encounter  Subjective:  She is coming in for follow-up  She is actually doing quite well  She was suffering from fatigue but started exercise and feels much better  She took 3 weeks off went over to [place] and had a nice time  She is eating healthy  Labs were recently stable  Thyroid nodule was not significant  She is continuing to exercise and feels well with no cardiac complaints  She is up-to-date on health maintenance issues but does need a colonoscopy  She did have some right lower quadrant discomfort when she was overseas  That is subsided  I am going to ask her to see her gynecologist for checkup just to make sure     Patient ID: Ernesto Juarez is a 62 y o  female  HPI    The following portions of the patient's history were reviewed and updated as appropriate:   She has no past medical history on file  ,  does not have any pertinent problems on file  ,   has no past surgical history on file  ,  family history includes Celiac disease in her child and father; Other in her family and father  ,   reports that she has never smoked  She has never used smokeless tobacco  She reports that she does not drink alcohol or use drugs  ,  has No Known Allergies       Current Outpatient Medications:     calcium carbonate (OS-RAYMUNDO) 600 MG tablet, Take 600 mg by mouth 2 (two) times a day with meals, Disp: , Rfl:     glucosamine-chondroitin 500-400 MG tablet, Take 1 tablet by mouth daily, Disp: , Rfl:     LORazepam (ATIVAN) 0 5 mg tablet, Take 1 tablet (0 5 mg total) by mouth every 8 (eight) hours as needed for anxiety, Disp: 30 tablet, Rfl: 0    zolpidem (AMBIEN) 10 mg tablet, Take 1 tablet (10 mg total) by mouth daily at bedtime as needed for sleep, Disp: 30 tablet, Rfl: 0    Review of Systems   Constitutional: Negative for activity change, appetite change, chills, diaphoresis, fatigue, fever and unexpected weight change  HENT: Negative for congestion, ear pain, hearing loss, mouth sores, nosebleeds, postnasal drip, sinus pressure, sinus pain, sore throat and trouble swallowing  Eyes: Negative for pain, discharge and visual disturbance  Respiratory: Negative for apnea, cough, chest tightness, shortness of breath and wheezing  Cardiovascular: Negative for chest pain, palpitations and leg swelling  Gastrointestinal: Negative for abdominal pain, anal bleeding, blood in stool, constipation, diarrhea, nausea and vomiting  She had some right lower quadrant discomfort a couple of months ago  She does not have it anymore  Endocrine: Negative for polydipsia and polyphagia  Genitourinary: Negative for decreased urine volume, dysuria, flank pain, frequency, hematuria and urgency  Musculoskeletal: Negative for arthralgias, back pain, gait problem, joint swelling and myalgias  Skin: Negative for rash and wound  Allergic/Immunologic: Negative for environmental allergies and food allergies  Neurological: Negative for dizziness, tremors, seizures, syncope, speech difficulty, light-headedness, numbness and headaches  Hematological: Negative for adenopathy  Does not bruise/bleed easily  Psychiatric/Behavioral: Negative for agitation, confusion, hallucinations, sleep disturbance and suicidal ideas  The patient is not nervous/anxious  Objective:  /70 (BP Location: Left arm, Patient Position: Sitting, Cuff Size: Standard)   Pulse 75   Ht 5' 7" (1 702 m)   SpO2 97%      Physical Exam   Constitutional: She appears well-developed and well-nourished  No distress  HENT:   Head: Normocephalic     Right Ear: External ear normal    Left Ear: External ear normal    Nose: Nose normal  Mouth/Throat: Oropharynx is clear and moist  No oropharyngeal exudate  Eyes: Pupils are equal, round, and reactive to light  Conjunctivae and EOM are normal  Right eye exhibits no discharge  Left eye exhibits no discharge  Neck: Normal range of motion  Neck supple  No thyromegaly present  Cardiovascular: Normal rate, regular rhythm, normal heart sounds and intact distal pulses  Exam reveals no gallop and no friction rub  No murmur heard  Pulmonary/Chest: Effort normal and breath sounds normal  No respiratory distress  She has no wheezes  She has no rales  Abdominal: Soft  Bowel sounds are normal  She exhibits no distension and no mass  There is no tenderness  There is no rebound and no guarding  Musculoskeletal: Normal range of motion  She exhibits no edema, tenderness or deformity  Lymphadenopathy:     She has no cervical adenopathy  Neurological: She is alert  She has normal reflexes  She displays normal reflexes  No cranial nerve deficit  Coordination normal    Skin: Skin is warm and dry  No rash noted  No erythema  Psychiatric: She has a normal mood and affect  Her behavior is normal  Judgment and thought content normal    Nursing note and vitals reviewed  No results found for this or any previous visit (from the past 1008 hour(s))

## 2020-07-16 ENCOUNTER — TELEPHONE (OUTPATIENT)
Dept: INTERNAL MEDICINE CLINIC | Facility: CLINIC | Age: 65
End: 2020-07-16

## 2020-07-16 ENCOUNTER — OFFICE VISIT (OUTPATIENT)
Dept: INTERNAL MEDICINE CLINIC | Facility: CLINIC | Age: 65
End: 2020-07-16
Payer: COMMERCIAL

## 2020-07-16 VITALS
DIASTOLIC BLOOD PRESSURE: 80 MMHG | HEIGHT: 67 IN | HEART RATE: 63 BPM | SYSTOLIC BLOOD PRESSURE: 126 MMHG | TEMPERATURE: 98 F | OXYGEN SATURATION: 99 %

## 2020-07-16 DIAGNOSIS — H65.01 NON-RECURRENT ACUTE SEROUS OTITIS MEDIA OF RIGHT EAR: Primary | ICD-10-CM

## 2020-07-16 PROCEDURE — 1036F TOBACCO NON-USER: CPT | Performed by: INTERNAL MEDICINE

## 2020-07-16 PROCEDURE — 99213 OFFICE O/P EST LOW 20 MIN: CPT | Performed by: INTERNAL MEDICINE

## 2020-07-16 RX ORDER — DIPHENOXYLATE HYDROCHLORIDE AND ATROPINE SULFATE 2.5; .025 MG/1; MG/1
1 TABLET ORAL DAILY
COMMUNITY

## 2020-07-16 NOTE — PROGRESS NOTES
Assessment/Plan:  There is no significant cerumen in either side  There may be serous otitis on the right side  She is already placed on amoxicillin by the dentist   Will add Flonase nasal spray to the regimen she will do that for 2 weeks  If there is no improvement she will let me know  Otherwise she will go ahead with her root canal     1  Non-recurrent acute serous otitis media of right ear         No orders of the defined types were placed in this encounter  Subjective:  Right ear discomfort and decreased hearing     Patient ID: Day Maurer is a 62 y o  female  HPI she came over from the dentist today  She needs to have a root canal in the right lower jaw  She has also noticed discomfort in the right ear and some decreased hearing  The dentist wanted her ear checked  The following portions of the patient's history were reviewed and updated as appropriate:   She has no past medical history on file  ,  does not have any pertinent problems on file  ,   has no past surgical history on file  ,  family history includes Celiac disease in her child and father; Other in her family and father  ,   reports that she has never smoked  She has never used smokeless tobacco  She reports that she does not drink alcohol or use drugs  ,  has No Known Allergies       Current Outpatient Medications:     calcium carbonate (OS-RAYMUNDO) 600 MG tablet, Take 600 mg by mouth 2 (two) times a day with meals, Disp: , Rfl:     cholecalciferol (VITAMIN D3) 1,000 units tablet, Take 1,000 Units by mouth daily, Disp: , Rfl:     glucosamine-chondroitin 500-400 MG tablet, Take 1 tablet by mouth daily, Disp: , Rfl:     LORazepam (ATIVAN) 0 5 mg tablet, Take 1 tablet (0 5 mg total) by mouth every 8 (eight) hours as needed for anxiety, Disp: 30 tablet, Rfl: 0    multivitamin (THERAGRAN) TABS, Take 1 tablet by mouth daily, Disp: , Rfl:     zolpidem (AMBIEN) 10 mg tablet, Take 1 tablet (10 mg total) by mouth daily at bedtime as needed for sleep, Disp: 30 tablet, Rfl: 0    Review of Systems  she generally feels well  She has had no fever  She has no problems with the left ear  She does have the need of a root canal on the right side  Discomfort and decreased hearing have been present for about 2-4 weeks  It is not severe  Objective:  /80 (BP Location: Left arm, Patient Position: Sitting, Cuff Size: Standard)   Pulse 63   Temp 98 °F (36 7 °C)   Ht 5' 7" (1 702 m)   SpO2 99%      Physical Exam  she is afebrile  Temperature is 98  Blood pressure is 126/80  The left tympanic membrane is unremarkable  The right is perhaps slightly dull  Posterior pharynx is unremarkable  No significant cervical adenopathy  Chest is clear  Heart rhythm is regular without murmur or gallop  No results found for this or any previous visit (from the past 1008 hour(s))

## 2020-09-08 ENCOUNTER — TELEPHONE (OUTPATIENT)
Dept: GASTROENTEROLOGY | Facility: CLINIC | Age: 65
End: 2020-09-08

## 2020-09-30 ENCOUNTER — APPOINTMENT (OUTPATIENT)
Dept: LAB | Facility: CLINIC | Age: 65
End: 2020-09-30
Payer: COMMERCIAL

## 2020-09-30 ENCOUNTER — OFFICE VISIT (OUTPATIENT)
Dept: INTERNAL MEDICINE CLINIC | Facility: CLINIC | Age: 65
End: 2020-09-30
Payer: COMMERCIAL

## 2020-09-30 VITALS
HEART RATE: 83 BPM | SYSTOLIC BLOOD PRESSURE: 120 MMHG | HEIGHT: 67 IN | OXYGEN SATURATION: 98 % | TEMPERATURE: 97.3 F | DIASTOLIC BLOOD PRESSURE: 78 MMHG

## 2020-09-30 DIAGNOSIS — E78.5 BORDERLINE HYPERLIPIDEMIA: ICD-10-CM

## 2020-09-30 DIAGNOSIS — F41.9 ANXIETY: ICD-10-CM

## 2020-09-30 DIAGNOSIS — Z23 ENCOUNTER FOR IMMUNIZATION: Primary | ICD-10-CM

## 2020-09-30 DIAGNOSIS — E04.1 THYROID NODULE: ICD-10-CM

## 2020-09-30 DIAGNOSIS — G47.00 INSOMNIA, UNSPECIFIED TYPE: ICD-10-CM

## 2020-09-30 LAB
ALBUMIN SERPL BCP-MCNC: 4.1 G/DL (ref 3.5–5)
ALP SERPL-CCNC: 99 U/L (ref 46–116)
ALT SERPL W P-5'-P-CCNC: 22 U/L (ref 12–78)
ANION GAP SERPL CALCULATED.3IONS-SCNC: 2 MMOL/L (ref 4–13)
AST SERPL W P-5'-P-CCNC: 15 U/L (ref 5–45)
BASOPHILS # BLD AUTO: 0.06 THOUSANDS/ΜL (ref 0–0.1)
BASOPHILS NFR BLD AUTO: 1 % (ref 0–1)
BILIRUB SERPL-MCNC: 0.6 MG/DL (ref 0.2–1)
BUN SERPL-MCNC: 15 MG/DL (ref 5–25)
CALCIUM SERPL-MCNC: 9.4 MG/DL (ref 8.3–10.1)
CHLORIDE SERPL-SCNC: 105 MMOL/L (ref 100–108)
CHOLEST SERPL-MCNC: 245 MG/DL (ref 50–200)
CO2 SERPL-SCNC: 31 MMOL/L (ref 21–32)
CREAT SERPL-MCNC: 0.72 MG/DL (ref 0.6–1.3)
EOSINOPHIL # BLD AUTO: 0.12 THOUSAND/ΜL (ref 0–0.61)
EOSINOPHIL NFR BLD AUTO: 2 % (ref 0–6)
ERYTHROCYTE [DISTWIDTH] IN BLOOD BY AUTOMATED COUNT: 13.2 % (ref 11.6–15.1)
GFR SERPL CREATININE-BSD FRML MDRD: 89 ML/MIN/1.73SQ M
GLUCOSE P FAST SERPL-MCNC: 82 MG/DL (ref 65–99)
HCT VFR BLD AUTO: 39.2 % (ref 34.8–46.1)
HDLC SERPL-MCNC: 74 MG/DL
HGB BLD-MCNC: 12.5 G/DL (ref 11.5–15.4)
IMM GRANULOCYTES # BLD AUTO: 0.01 THOUSAND/UL (ref 0–0.2)
IMM GRANULOCYTES NFR BLD AUTO: 0 % (ref 0–2)
LDLC SERPL CALC-MCNC: 159 MG/DL (ref 0–100)
LYMPHOCYTES # BLD AUTO: 1.74 THOUSANDS/ΜL (ref 0.6–4.47)
LYMPHOCYTES NFR BLD AUTO: 33 % (ref 14–44)
MCH RBC QN AUTO: 29.9 PG (ref 26.8–34.3)
MCHC RBC AUTO-ENTMCNC: 31.9 G/DL (ref 31.4–37.4)
MCV RBC AUTO: 94 FL (ref 82–98)
MONOCYTES # BLD AUTO: 0.42 THOUSAND/ΜL (ref 0.17–1.22)
MONOCYTES NFR BLD AUTO: 8 % (ref 4–12)
NEUTROPHILS # BLD AUTO: 2.9 THOUSANDS/ΜL (ref 1.85–7.62)
NEUTS SEG NFR BLD AUTO: 56 % (ref 43–75)
NONHDLC SERPL-MCNC: 171 MG/DL
NRBC BLD AUTO-RTO: 0 /100 WBCS
PLATELET # BLD AUTO: 264 THOUSANDS/UL (ref 149–390)
PMV BLD AUTO: 9.8 FL (ref 8.9–12.7)
POTASSIUM SERPL-SCNC: 4.1 MMOL/L (ref 3.5–5.3)
PROT SERPL-MCNC: 7.9 G/DL (ref 6.4–8.2)
RBC # BLD AUTO: 4.18 MILLION/UL (ref 3.81–5.12)
SODIUM SERPL-SCNC: 138 MMOL/L (ref 136–145)
T4 FREE SERPL-MCNC: 1.1 NG/DL (ref 0.76–1.46)
TRIGL SERPL-MCNC: 61 MG/DL
TSH SERPL DL<=0.05 MIU/L-ACNC: 3.55 UIU/ML (ref 0.36–3.74)
WBC # BLD AUTO: 5.25 THOUSAND/UL (ref 4.31–10.16)

## 2020-09-30 PROCEDURE — 85025 COMPLETE CBC W/AUTO DIFF WBC: CPT

## 2020-09-30 PROCEDURE — 1036F TOBACCO NON-USER: CPT | Performed by: INTERNAL MEDICINE

## 2020-09-30 PROCEDURE — 84443 ASSAY THYROID STIM HORMONE: CPT

## 2020-09-30 PROCEDURE — 36415 COLL VENOUS BLD VENIPUNCTURE: CPT

## 2020-09-30 PROCEDURE — 84439 ASSAY OF FREE THYROXINE: CPT

## 2020-09-30 PROCEDURE — 90682 RIV4 VACC RECOMBINANT DNA IM: CPT | Performed by: INTERNAL MEDICINE

## 2020-09-30 PROCEDURE — 99214 OFFICE O/P EST MOD 30 MIN: CPT | Performed by: INTERNAL MEDICINE

## 2020-09-30 PROCEDURE — 90471 IMMUNIZATION ADMIN: CPT | Performed by: INTERNAL MEDICINE

## 2020-09-30 PROCEDURE — 80053 COMPREHEN METABOLIC PANEL: CPT

## 2020-09-30 PROCEDURE — 80061 LIPID PANEL: CPT

## 2020-09-30 RX ORDER — LORAZEPAM 0.5 MG/1
0.5 TABLET ORAL EVERY 8 HOURS PRN
Qty: 30 TABLET | Refills: 0 | Status: SHIPPED | OUTPATIENT
Start: 2020-09-30 | End: 2020-12-31 | Stop reason: SDUPTHER

## 2020-09-30 RX ORDER — ZOLPIDEM TARTRATE 10 MG/1
10 TABLET ORAL
Qty: 30 TABLET | Refills: 0 | Status: SHIPPED | OUTPATIENT
Start: 2020-09-30 | End: 2021-06-04 | Stop reason: SDUPTHER

## 2020-09-30 NOTE — PROGRESS NOTES
Assessment/Plan:  Regarding her health maintenance she will have complete blood work today  She does have some anxiety and a thyroid nodule and will have a repeat ultrasound as well as thyroid functions  She is going to follow-up with the dermatologist     She will get a flu shot today  She will follow-up with a new practitioner in 3 or 4 months  Before if any problems  She will continue the current medicines  1  Encounter for immunization  influenza vaccine, quadrivalent, recombinant, PF, 0 5 mL, for patients 18 yr+ (FLUBLOK)   2  Anxiety  LORazepam (ATIVAN) 0 5 mg tablet    T4, free    TSH, 3rd generation   3  Insomnia, unspecified type  zolpidem (AMBIEN) 10 mg tablet   4  Thyroid nodule  US thyroid   5  Borderline hyperlipidemia  CBC and differential    Comprehensive metabolic panel    Lipid panel       Orders Placed This Encounter   Procedures    US thyroid    influenza vaccine, quadrivalent, recombinant, PF, 0 5 mL, for patients 18 yr+ (FLUBLOK)    CBC and differential    Comprehensive metabolic panel    Lipid panel    T4, free    TSH, 3rd generation            Subjective:  She comes in for follow-up  She is actually doing fairly well but does have a lot of anxiety  She had a URI a couple of months ago but that resolved  She noticed that she is having some nail abnormalities and is going to see a dermatologist for that  She has a history of a thyroid nodule  She will be checked for a repeat ultrasound  Patient ID: Erick Cooks is a 60 y o  female  HPI    The following portions of the patient's history were reviewed and updated as appropriate:   She has no past medical history on file  ,  does not have any pertinent problems on file  ,   has no past surgical history on file  ,  family history includes Celiac disease in her child and father; Other in her family and father  ,   reports that she has never smoked   She has never used smokeless tobacco  She reports that she does not drink alcohol or use drugs  ,  has No Known Allergies       Current Outpatient Medications:     calcium carbonate (OS-RAYMUNDO) 600 MG tablet, Take 600 mg by mouth 2 (two) times a day with meals, Disp: , Rfl:     cholecalciferol (VITAMIN D3) 1,000 units tablet, Take 1,000 Units by mouth daily, Disp: , Rfl:     glucosamine-chondroitin 500-400 MG tablet, Take 1 tablet by mouth daily, Disp: , Rfl:     LORazepam (ATIVAN) 0 5 mg tablet, Take 1 tablet (0 5 mg total) by mouth every 8 (eight) hours as needed for anxiety, Disp: 30 tablet, Rfl: 0    multivitamin (THERAGRAN) TABS, Take 1 tablet by mouth daily, Disp: , Rfl:     zolpidem (AMBIEN) 10 mg tablet, Take 1 tablet (10 mg total) by mouth daily at bedtime as needed for sleep, Disp: 30 tablet, Rfl: 0    Review of Systems   Constitutional: Negative for activity change, appetite change, chills, diaphoresis, fatigue, fever and unexpected weight change  She is always a little bit anxious  She does take lorazepam as needed for that   HENT: Negative for congestion, ear pain, hearing loss, mouth sores, nosebleeds, postnasal drip, sinus pressure, sinus pain, sore throat and trouble swallowing  Eyes: Negative for pain, discharge and visual disturbance  Respiratory: Negative for apnea, cough, chest tightness, shortness of breath and wheezing  Cardiovascular: Negative for chest pain, palpitations and leg swelling  Gastrointestinal: Negative for abdominal pain, anal bleeding, blood in stool, constipation, diarrhea, nausea and vomiting  Endocrine: Negative for polydipsia and polyphagia  Positive history of a thyroid spongiform nodule   Genitourinary: Negative for decreased urine volume, dysuria, flank pain, frequency, hematuria and urgency  Musculoskeletal: Negative for arthralgias, back pain, gait problem, joint swelling and myalgias  Skin: Negative for rash and wound  Allergic/Immunologic: Negative for environmental allergies and food allergies  Neurological: Negative for dizziness, tremors, seizures, syncope, speech difficulty, light-headedness, numbness and headaches  Hematological: Negative for adenopathy  Does not bruise/bleed easily  Psychiatric/Behavioral: Negative for agitation, confusion, hallucinations, sleep disturbance and suicidal ideas  The patient is nervous/anxious  Objective:  /78 (BP Location: Left arm, Patient Position: Sitting, Cuff Size: Standard)   Pulse 83   Temp (!) 97 3 °F (36 3 °C)   Ht 5' 7" (1 702 m)   SpO2 98%      Physical Exam  Vitals signs and nursing note reviewed  Constitutional:       General: She is not in acute distress  Appearance: She is well-developed  HENT:      Head: Normocephalic  Right Ear: External ear normal       Left Ear: External ear normal       Nose: Nose normal       Mouth/Throat:      Pharynx: No oropharyngeal exudate  Eyes:      General:         Right eye: No discharge  Left eye: No discharge  Conjunctiva/sclera: Conjunctivae normal       Pupils: Pupils are equal, round, and reactive to light  Neck:      Musculoskeletal: Normal range of motion and neck supple  Thyroid: No thyromegaly  Comments: No definitive nodule felt at this time  Cardiovascular:      Rate and Rhythm: Normal rate and regular rhythm  Heart sounds: Normal heart sounds  No murmur  No friction rub  No gallop  Pulmonary:      Effort: Pulmonary effort is normal  No respiratory distress  Breath sounds: Normal breath sounds  No wheezing or rales  Abdominal:      General: Bowel sounds are normal  There is no distension  Palpations: Abdomen is soft  There is no mass  Tenderness: There is no abdominal tenderness  There is no guarding or rebound  Musculoskeletal: Normal range of motion  General: No tenderness or deformity  Lymphadenopathy:      Cervical: No cervical adenopathy  Skin:     General: Skin is warm and dry        Findings: No erythema or rash  Neurological:      Mental Status: She is alert  Cranial Nerves: No cranial nerve deficit  Coordination: Coordination normal       Deep Tendon Reflexes: Reflexes are normal and symmetric  Reflexes normal    Psychiatric:         Behavior: Behavior normal          Thought Content: Thought content normal          Judgment: Judgment normal            No results found for this or any previous visit (from the past 1008 hour(s))

## 2020-10-01 ENCOUNTER — TELEPHONE (OUTPATIENT)
Dept: ADMINISTRATIVE | Facility: OTHER | Age: 65
End: 2020-10-01

## 2020-10-21 ENCOUNTER — HOSPITAL ENCOUNTER (OUTPATIENT)
Dept: ULTRASOUND IMAGING | Facility: HOSPITAL | Age: 65
Discharge: HOME | End: 2020-10-21
Attending: INTERNAL MEDICINE
Payer: COMMERCIAL

## 2020-10-21 DIAGNOSIS — E04.1 THYROID NODULE: ICD-10-CM

## 2020-10-21 PROCEDURE — 76536 US EXAM OF HEAD AND NECK: CPT

## 2020-10-22 ENCOUNTER — TELEPHONE (OUTPATIENT)
Dept: INTERNAL MEDICINE CLINIC | Facility: CLINIC | Age: 65
End: 2020-10-22

## 2020-10-23 ENCOUNTER — TELEPHONE (OUTPATIENT)
Dept: INTERNAL MEDICINE CLINIC | Facility: CLINIC | Age: 65
End: 2020-10-23

## 2020-11-20 ENCOUNTER — OFFICE VISIT (OUTPATIENT)
Dept: INTERNAL MEDICINE CLINIC | Facility: CLINIC | Age: 65
End: 2020-11-20
Payer: COMMERCIAL

## 2020-11-20 ENCOUNTER — APPOINTMENT (OUTPATIENT)
Dept: LAB | Facility: CLINIC | Age: 65
End: 2020-11-20
Payer: COMMERCIAL

## 2020-11-20 VITALS
WEIGHT: 160 LBS | SYSTOLIC BLOOD PRESSURE: 128 MMHG | HEART RATE: 88 BPM | OXYGEN SATURATION: 99 % | HEIGHT: 67 IN | BODY MASS INDEX: 25.11 KG/M2 | DIASTOLIC BLOOD PRESSURE: 70 MMHG | TEMPERATURE: 98.8 F

## 2020-11-20 DIAGNOSIS — E78.2 MIXED HYPERLIPIDEMIA: Primary | ICD-10-CM

## 2020-11-20 DIAGNOSIS — Z20.822 EXPOSURE TO COVID-19 VIRUS: ICD-10-CM

## 2020-11-20 DIAGNOSIS — F41.1 GENERALIZED ANXIETY DISORDER: ICD-10-CM

## 2020-11-20 DIAGNOSIS — E66.3 OVERWEIGHT: ICD-10-CM

## 2020-11-20 DIAGNOSIS — Z11.59 NEED FOR HEPATITIS C SCREENING TEST: ICD-10-CM

## 2020-11-20 DIAGNOSIS — E04.1 THYROID NODULE: ICD-10-CM

## 2020-11-20 PROBLEM — M19.072: Status: ACTIVE | Noted: 2019-06-06

## 2020-11-20 PROBLEM — M67.969 LOWER EXTREMITY TENDINOPATHY: Status: ACTIVE | Noted: 2019-06-06

## 2020-11-20 PROBLEM — L57.8 SOLAR DEGENERATION: Status: ACTIVE | Noted: 2020-09-02

## 2020-11-20 PROBLEM — M67.90 LOWER EXTREMITY TENDINOPATHY: Status: ACTIVE | Noted: 2019-06-06

## 2020-11-20 PROBLEM — E78.5 BORDERLINE HYPERLIPIDEMIA: Status: RESOLVED | Noted: 2019-11-22 | Resolved: 2020-11-20

## 2020-11-20 PROBLEM — M72.2 PLANTAR FASCIITIS: Status: ACTIVE | Noted: 2019-06-06

## 2020-11-20 PROCEDURE — 99214 OFFICE O/P EST MOD 30 MIN: CPT | Performed by: INTERNAL MEDICINE

## 2020-11-20 PROCEDURE — 86769 SARS-COV-2 COVID-19 ANTIBODY: CPT

## 2020-11-20 PROCEDURE — 36415 COLL VENOUS BLD VENIPUNCTURE: CPT

## 2020-11-20 PROCEDURE — 3008F BODY MASS INDEX DOCD: CPT | Performed by: INTERNAL MEDICINE

## 2020-11-20 RX ORDER — NICOTINE POLACRILEX 2 MG
GUM BUCCAL DAILY
COMMUNITY

## 2020-11-23 ENCOUNTER — TELEPHONE (OUTPATIENT)
Dept: INTERNAL MEDICINE CLINIC | Facility: CLINIC | Age: 65
End: 2020-11-23

## 2020-11-23 ENCOUNTER — TELEPHONE (OUTPATIENT)
Dept: ADMINISTRATIVE | Facility: OTHER | Age: 65
End: 2020-11-23

## 2020-11-24 ENCOUNTER — TELEPHONE (OUTPATIENT)
Dept: INTERNAL MEDICINE CLINIC | Facility: CLINIC | Age: 65
End: 2020-11-24

## 2020-11-24 LAB
SARS-COV-2 IGG SERPL QL IA: NEGATIVE
SARS-COV-2 IGG SERPL QL IA: NORMAL

## 2020-12-31 DIAGNOSIS — F41.9 ANXIETY: ICD-10-CM

## 2020-12-31 RX ORDER — LORAZEPAM 0.5 MG/1
0.5 TABLET ORAL DAILY PRN
Qty: 30 TABLET | Refills: 0 | Status: SHIPPED | OUTPATIENT
Start: 2020-12-31 | End: 2021-06-04 | Stop reason: SDUPTHER

## 2021-01-29 ENCOUNTER — OFFICE VISIT (OUTPATIENT)
Dept: INTERNAL MEDICINE CLINIC | Facility: CLINIC | Age: 66
End: 2021-01-29
Payer: COMMERCIAL

## 2021-01-29 ENCOUNTER — APPOINTMENT (OUTPATIENT)
Dept: LAB | Facility: CLINIC | Age: 66
End: 2021-01-29
Payer: COMMERCIAL

## 2021-01-29 VITALS
TEMPERATURE: 97.9 F | SYSTOLIC BLOOD PRESSURE: 108 MMHG | DIASTOLIC BLOOD PRESSURE: 70 MMHG | RESPIRATION RATE: 12 BRPM | HEART RATE: 78 BPM

## 2021-01-29 DIAGNOSIS — R20.0 FACIAL NUMBNESS: ICD-10-CM

## 2021-01-29 DIAGNOSIS — R20.0 FACIAL NUMBNESS: Primary | ICD-10-CM

## 2021-01-29 LAB
25(OH)D3 SERPL-MCNC: 26.2 NG/ML (ref 30–100)
ALBUMIN SERPL BCP-MCNC: 4.3 G/DL (ref 3.5–5)
ALP SERPL-CCNC: 91 U/L (ref 46–116)
ALT SERPL W P-5'-P-CCNC: 21 U/L (ref 12–78)
ANION GAP SERPL CALCULATED.3IONS-SCNC: 3 MMOL/L (ref 4–13)
AST SERPL W P-5'-P-CCNC: 18 U/L (ref 5–45)
BILIRUB SERPL-MCNC: 0.55 MG/DL (ref 0.2–1)
BUN SERPL-MCNC: 13 MG/DL (ref 5–25)
CALCIUM SERPL-MCNC: 9.6 MG/DL (ref 8.3–10.1)
CHLORIDE SERPL-SCNC: 104 MMOL/L (ref 100–108)
CO2 SERPL-SCNC: 30 MMOL/L (ref 21–32)
CREAT SERPL-MCNC: 0.68 MG/DL (ref 0.6–1.3)
GFR SERPL CREATININE-BSD FRML MDRD: 93 ML/MIN/1.73SQ M
GLUCOSE P FAST SERPL-MCNC: 97 MG/DL (ref 65–99)
POTASSIUM SERPL-SCNC: 4.1 MMOL/L (ref 3.5–5.3)
PROT SERPL-MCNC: 7.5 G/DL (ref 6.4–8.2)
SODIUM SERPL-SCNC: 137 MMOL/L (ref 136–145)
VIT B12 SERPL-MCNC: 393 PG/ML (ref 100–900)

## 2021-01-29 PROCEDURE — 80053 COMPREHEN METABOLIC PANEL: CPT

## 2021-01-29 PROCEDURE — 82306 VITAMIN D 25 HYDROXY: CPT

## 2021-01-29 PROCEDURE — 82607 VITAMIN B-12: CPT

## 2021-01-29 PROCEDURE — 36415 COLL VENOUS BLD VENIPUNCTURE: CPT

## 2021-01-29 PROCEDURE — 99214 OFFICE O/P EST MOD 30 MIN: CPT | Performed by: NURSE PRACTITIONER

## 2021-01-29 RX ORDER — AMOXICILLIN 500 MG/1
500 TABLET, FILM COATED ORAL 2 TIMES DAILY
COMMUNITY
End: 2022-05-09 | Stop reason: CLARIF

## 2021-01-29 NOTE — ASSESSMENT & PLAN NOTE
Patient presents to the office today with a feeling of numbness and tingling in her face  The patient states that approximately 2 weeks ago she began having to sit pain which radiated to her right ear  She felt the sensation of fluid draining from that ear in the middle of the night  The patient continues with bilateral ear pressure like feeling  She did contact her dentist who put her on amoxicillin at this time  She states currently she is having numbness around her mouth, her tongue and midline down her nose  Patient denied any injuries to her head  Patient has not been experiencing any headaches  She has not had any falls at home  She has no other musculoskeletal complaints  Neurologic exam in the office today was within normal limits  There are new medications besides the amoxicillin  There is no facial droop  Blood work has been ordered for the patient at this time  Including a vitamin B12, vitamin-D level and BMP  She does have an upcoming appointment with Dr John Henley next week  I did recommend to the patient that she try either Zyrtec or Claritin as well as Flonase nasal spray over the next several days to see if that helps her symptoms

## 2021-01-29 NOTE — PROGRESS NOTES
INTERNAL MEDICINE FOLLOW-UP OFFICE VISIT  [facility] - [facility]    NAME: Guido Cruz  AGE: 60 y o  SEX: female    DATE OF ENCOUNTER: 1/29/2021   Assessment and Plan:     Problem List Items Addressed This Visit        Other    Facial numbness - Primary       Patient presents to the office today with a feeling of numbness and tingling in her face  The patient states that approximately 2 weeks ago she began having to sit pain which radiated to her right ear  She felt the sensation of fluid draining from that ear in the middle of the night  The patient continues with bilateral ear pressure like feeling  She did contact her dentist who put her on amoxicillin at this time  She states currently she is having numbness around her mouth, her tongue and midline down her nose  Patient denied any injuries to her head  Patient has not been experiencing any headaches  She has not had any falls at home  She has no other musculoskeletal complaints  Neurologic exam in the office today was within normal limits  There are new medications besides the amoxicillin  There is no facial droop  Blood work has been ordered for the patient at this time  Including a vitamin B12, vitamin-D level and BMP  She does have an upcoming appointment with Dr Sherry Hoang next week  I did recommend to the patient that she try either Zyrtec or Claritin as well as Flonase nasal spray over the next several days to see if that helps her symptoms  Relevant Orders    Vitamin B12    Vitamin D 25 hydroxy    Comprehensive metabolic panel          No follow-ups on file  Counseling:     · Medication Side Effects - Adverse side effects of medications were reviewed with the patient/guardian today: Yes  · Counseling was given regarding: Intructions for management and Risk factor reductions    · Barriers to treatment include: No identified barriers      Chief Complaint:     Chief Complaint   Patient presents with    Numbness     face- having tooth problems   Jaw Pain     ear clogged        History of Present Illness:     MINERVA Cleary   Presents to the office today for a new concern of ear pressure and the sensation of her ears being clogged, facial numbness, and tooth pain  Her tooth pain is being managed by her dentist who recently placed her on amoxicillin  She does have an upcoming appointment  Blood work has been ordered  She will be re-evaluated in the office next week as she has an appointment with a different provider  Patient has been instructed that should her symptoms worsen or she develops any other worsening symptoms such as loss of vision, blurred vision, facial droop or muscle weakness she should be evaluated in the emergency room  The following portions of the patient's history were reviewed and updated as appropriate: allergies, current medications, past family history, past medical history, past social history, past surgical history and problem list      Review of Systems:     Review of Systems   Constitutional: Negative for activity change, fatigue and fever  HENT: Positive for ear pain (right)  Negative for congestion, hearing loss, rhinorrhea, trouble swallowing and voice change  Eyes: Negative for photophobia, pain, discharge and visual disturbance  Respiratory: Negative for cough, chest tightness and shortness of breath  Cardiovascular: Negative for chest pain, palpitations and leg swelling  Gastrointestinal: Negative for abdominal pain, blood in stool, constipation, nausea and vomiting  Endocrine: Negative for cold intolerance and heat intolerance  Genitourinary: Negative for difficulty urinating, frequency, hematuria, urgency, vaginal bleeding and vaginal discharge  Musculoskeletal: Negative for arthralgias and myalgias  Skin: Negative  Neurological: Positive for numbness (bilateral face, mouth, tongue)  Negative for dizziness, weakness and headaches  Psychiatric/Behavioral: Negative for decreased concentration  The patient is not nervous/anxious  Problem List:     Patient Active Problem List   Diagnosis    Mixed hyperlipidemia    Thyroid nodule    Chronic fatigue    Solar degeneration    Pain in joint, lower leg    Plantar fasciitis    Peripheral neuropathy    Lower extremity tendinopathy    History of myocardial infarction    Generalized anxiety disorder    Degenerative joint disease of left ankle and foot    Atrial fibrillation (HCC)    Overweight        Objective:     /70 (BP Location: Left arm, Patient Position: Sitting, Cuff Size: Standard)   Pulse 78   Temp 97 9 °F (36 6 °C) (Temporal)   Resp 12     Physical Exam  Constitutional:       General: She is not in acute distress  Appearance: Normal appearance  She is well-developed  HENT:      Head: Normocephalic and atraumatic  Right Ear: Tympanic membrane, ear canal and external ear normal  There is no impacted cerumen  Left Ear: Tympanic membrane, ear canal and external ear normal  There is no impacted cerumen  Nose: Nose normal       Mouth/Throat:      Mouth: Mucous membranes are moist       Pharynx: Oropharynx is clear  No oropharyngeal exudate or posterior oropharyngeal erythema  Eyes:      Pupils: Pupils are equal, round, and reactive to light  Neck:      Musculoskeletal: Normal range of motion  Cardiovascular:      Rate and Rhythm: Normal rate and regular rhythm  Heart sounds: Normal heart sounds  No murmur  Pulmonary:      Effort: Pulmonary effort is normal  No respiratory distress  Breath sounds: Normal breath sounds  No wheezing  Musculoskeletal: Normal range of motion  Skin:     General: Skin is warm and dry  Neurological:      General: No focal deficit present  Mental Status: She is alert and oriented to person, place, and time  Cranial Nerves: No cranial nerve deficit  Sensory: No sensory deficit  Motor: No weakness  Coordination: Coordination normal       Gait: Gait normal       Deep Tendon Reflexes: Reflexes normal    Psychiatric:         Mood and Affect: Mood normal          Behavior: Behavior normal          Thought Content: Thought content normal          Judgment: Judgment normal         Current Medications:     Current Outpatient Medications   Medication Sig Dispense Refill    amoxicillin (AMOXIL) 500 MG tablet Take 500 mg by mouth 2 (two) times a day      Biotin 1 MG CAPS Take by mouth      calcium carbonate (OS-RAYMUNDO) 600 MG tablet Take 600 mg by mouth 2 (two) times a day with meals      cholecalciferol (VITAMIN D3) 1,000 units tablet Take 1,000 Units by mouth daily      glucosamine-chondroitin 500-400 MG tablet Take 1 tablet by mouth daily      LORazepam (ATIVAN) 0 5 mg tablet Take 1 tablet (0 5 mg total) by mouth daily as needed for anxiety 30 tablet 0    magnesium gluconate (MAGONATE) 500 mg tablet Take 500 mg by mouth 2 (two) times a day      multivitamin (THERAGRAN) TABS Take 1 tablet by mouth daily      zolpidem (AMBIEN) 10 mg tablet Take 1 tablet (10 mg total) by mouth daily at bedtime as needed for sleep (Patient not taking: Reported on 1/29/2021) 30 tablet 0     No current facility-administered medications for this visit  There are no Patient Instructions on file for this visit      DIANE Coronado  [facility]

## 2021-01-29 NOTE — PATIENT INSTRUCTIONS
Cholesterol and Your Health   AMBULATORY CARE:   Cholesterol  is a waxy, fat-like substance  Your body uses cholesterol to make hormones and new cells, and to protect nerves  Cholesterol is made by your body  It also comes from certain foods you eat, such as meat and dairy products  Your healthcare provider can help you set goals for your cholesterol levels  He or she can help you create a plan to meet your goals  Cholesterol level goals: Your cholesterol level goals depend on your risk for heart disease, your age, and your other health conditions  The following are general guidelines:  · Total cholesterol  includes low-density lipoprotein (LDL), high-density lipoprotein (HDL), and triglyceride levels  The total cholesterol level should be lower than 200 mg/dL and is best at about 150 mg/dL  · LDL cholesterol  is called bad cholesterol  because it forms plaque in your arteries  As plaque builds up, your arteries become narrow, and less blood flows through  When plaque decreases blood flow to your heart, you may have chest pain  If plaque completely blocks an artery that brings blood to your heart, you may have a heart attack  Plaque can break off and form blood clots  Blood clots may block arteries in your brain and cause a stroke  The level should be less than 130 mg/dL and is best at about 100 mg/dL  · HDL cholesterol  is called good cholesterol  because it helps remove LDL cholesterol from your arteries  It does this by attaching to LDL cholesterol and carrying it to your liver  Your liver breaks down LDL cholesterol so your body can get rid of it  High levels of HDL cholesterol can help prevent a heart attack and stroke  Low levels of HDL cholesterol can increase your risk for heart disease, heart attack, and stroke  The level should be 60 mg/dL or higher  · Triglycerides  are a type of fat that store energy from foods you eat  High levels of triglycerides also cause plaque buildup   This can increase your risk for a heart attack or stroke  If your triglyceride level is high, your LDL cholesterol level may also be high  The level should be less than 150 mg/dL  What increases your risk for high cholesterol:   · Smoking cigarettes    · Being overweight or obese, or not getting enough exercise    · Drinking large amounts of alcohol    · A medical condition such as hypertension (high blood pressure) or diabetes    · Certain genes passed from your parents to you    · Age older than 65 years    What you need to know about having your cholesterol levels checked: Adults 21to 39years of age should have their cholesterol levels checked every 4 to 6 years  Adults 45 years or older should have their cholesterol checked every 1 to 2 years  You may need your cholesterol checked more often, or at a younger age, if you have risk factors for heart disease  You may also need to have your cholesterol checked more often if you have other health conditions, such as diabetes  Blood tests are used to check cholesterol levels  Blood tests measure your levels of triglycerides, LDL cholesterol, and HDL cholesterol  How healthy fats affect your cholesterol levels:  Healthy fats, also called unsaturated fats, help lower LDL cholesterol and triglyceride levels  Healthy fats include the following:  · Monounsaturated fats  are found in foods such as olive oil, canola oil, avocado, nuts, and olives  · Polyunsaturated fats,  such as omega 3 fats, are found in fish, such as salmon, trout, and tuna  They can also be found in plant foods such as flaxseed, walnuts, and soybeans  How unhealthy fats affect your cholesterol levels:  Unhealthy fats increase LDL cholesterol and triglyceride levels  They are found in foods high in cholesterol, saturated fat, and trans fat:  · Cholesterol  is found in eggs, dairy, and meat  · Saturated fat  is found in butter, cheese, ice cream, whole milk, and coconut oil   Saturated fat is also found in meat, such as sausage, hot dogs, and bologna  · Trans fat  is found in liquid oils and is used in fried and baked foods  Foods that contain trans fats include chips, crackers, muffins, sweet rolls, microwave popcorn, and cookies  Treatment  for high cholesterol will also decrease your risk of heart disease, heart attack, and stroke  Treatment may include any of the following:  · Lifestyle changes  may include food, exercise, weight loss, and quitting smoking  You may also need to decrease the amount of alcohol you drink  Your healthcare provider will want you to start with lifestyle changes  Other treatment may be added if lifestyle changes are not enough  · Medicines  may be given to lower your LDL cholesterol, triglyceride levels, or total cholesterol level  You may need medicines to lower your cholesterol if any of the following is true:     ? You have a history of stroke, TIA, unstable angina, or a heart attack  ? Your LDL cholesterol level is 190 mg/dL or higher  ? You are age 36 to 76 years, have diabetes or heart disease risk factors, and your LDL cholesterol is 70 mg/dL or higher  · Supplements  include fish oil, red yeast rice, and garlic  Fish oil may help lower your triglyceride and LDL cholesterol levels  It may also increase your HDL cholesterol level  Red yeast rice may help decrease your total cholesterol level and LDL cholesterol level  Garlic may help lower your total cholesterol level  Do not take these supplements without talking to your healthcare provider  Food changes you can make to lower your cholesterol levels:  A dietitian can help you create a healthy eating plan  He or she can show you how to read food labels and choose foods low in saturated fat, trans fats, and cholesterol  · Decrease the total amount of fat you eat  Choose lean meats, fat-free or 1% fat milk, and low-fat dairy products, such as yogurt and cheese  Try to limit or avoid red meats  Limit or do not eat fried foods or baked goods, such as cookies  · Replace unhealthy fats with healthy fats  Cook foods in olive oil or canola oil  Choose soft margarines that are low in saturated fat and trans fat  Seeds, nuts, and avocados are other examples of healthy fats  · Eat foods with omega-3 fats  Examples include salmon, tuna, mackerel, walnuts, and flaxseed  Eat fish 2 times per week  Pregnant women should not eat fish that have high levels of mercury, such as shark, swordfish, and aurea mackerel  · Increase the amount of high-fiber foods you eat  High-fiber foods can help lower your LDL cholesterol  Aim to get between 20 and 30 grams of fiber each day  Fruits and vegetables are high in fiber  Eat at least 5 servings each day  Other high-fiber foods are whole-grain or whole-wheat breads, pastas, or cereals, and brown rice  Eat 3 ounces of whole-grain foods each day  Increase fiber slowly  You may have abdominal discomfort, bloating, and gas if you add fiber to your diet too quickly  · Eat healthy protein foods  Examples include low-fat dairy products, skinless chicken and turkey, fish, and nuts  · Limit foods and drinks that are high in sugar  Your dietitian or healthcare provider can help you create daily limits for high-sugar foods and drinks  The limit may be lower if you have diabetes or another health condition  Limits can also help you lose weight if needed  Lifestyle changes you can make to lower your cholesterol levels:   · Maintain a healthy weight  Ask your healthcare provider what a healthy weight is for you  Ask him or her to help you create a weight loss plan if needed  Weight loss can decrease your total cholesterol and triglyceride levels  Weight loss may also help keep your blood pressure at a healthy level  · Exercise regularly  Exercise can help lower your total cholesterol level and maintain a healthy weight   Exercise can also help increase your HDL cholesterol level  Work with your healthcare provider to create an exercise program that is right for you  Get at least 30 to 40 minutes of moderate exercise most days of the week  Examples of exercise include brisk walking, swimming, or biking  · Do not smoke  Nicotine and other chemicals in cigarettes and cigars can raise your cholesterol levels  Ask your healthcare provider for information if you currently smoke and need help to quit  E-cigarettes or smokeless tobacco still contain nicotine  Talk to your healthcare provider before you use these products  · Limit or do not drink alcohol  Alcohol can increase your triglyceride levels  Ask your healthcare provider before you drink alcohol  Ask how much is okay for you to drink in 1 day or 1 week  © Copyright [facility] [address] Information is for End User's use only and may not be sold, redistributed or otherwise used for commercial purposes  All illustrations and images included in CareNotes® are the copyrighted property of [employer]  or Formerly named [facility] Rashaun Glass   The above information is an  only  It is not intended as medical advice for individual conditions or treatments  Talk to your doctor, nurse or pharmacist before following any medical regimen to see if it is safe and effective for you  Low Fat Diet   WHAT YOU NEED TO KNOW:   A low-fat diet is an eating plan that is low in total fat, unhealthy fat, and cholesterol  You may need to follow a low-fat diet if you have trouble digesting or absorbing fat  You may also need to follow this diet if you have high cholesterol  You can also lower your cholesterol by increasing the amount of fiber in your diet  Soluble fiber is a type of fiber that helps to decrease cholesterol levels  DISCHARGE INSTRUCTIONS:   Different types of fat in food:   · Limit unhealthy fats  A diet that is high in cholesterol, saturated fat, and trans fat may cause unhealthy cholesterol levels   Unhealthy cholesterol levels increase your risk of heart disease  ? Cholesterol:  Limit intake of cholesterol to less than 200 mg per day  Cholesterol is found in meat, eggs, and dairy  ? Saturated fat:  Limit saturated fat to less than 7% of your total daily calories  Ask your dietitian how many calories you need each day  Saturated fat is found in butter, cheese, ice cream, whole milk, and palm oil  Saturated fat is also found in meat, such as beef, pork, chicken skin, and processed meats  Processed meats include sausage, hot dogs, and bologna  ? Trans fat:  Avoid trans fat as much as possible  Trans fat is used in fried and baked foods  Foods that say trans fat free on the label may still have up to 0 5 grams of trans fat per serving  · Include healthy fats  Replace foods that are high in saturated and trans fat with foods high in healthy fats  This may help to decrease high cholesterol levels  ? Monounsaturated fats: These are found in avocados, nuts, and vegetable oils, such as olive, canola, and sunflower oil  ? Polyunsaturated fats: These can be found in vegetable oils, such as soybean or corn oil  Omega-3 fats can help to decrease the risk of heart disease  Omega-3 fats are found in fish, such as salmon, herring, trout, and tuna  Omega-3 fats can also be found in plant foods, such as walnuts, flaxseed, soybeans, and canola oil  Foods to limit or avoid:   · Grains:      ? Snacks that are made with partially hydrogenated oils, such as chips, regular crackers, and butter-flavored popcorn    ? High-fat baked goods, such as biscuits, croissants, doughnuts, pies, cookies, and pastries    · Dairy:      ? Whole milk, 2% milk, and yogurt and ice cream made with whole milk    ? Half and half creamer, heavy cream, and whipping cream    ? Cheese, cream cheese, and sour cream    · Meats and proteins:      ? High-fat cuts of meat (T-bone steak, regular hamburger, and ribs)    ?  Fried meat, poultry (turkey and chicken), and fish    ? Poultry (chicken and turkey) with skin    ? Cold cuts (salami or bologna), hot dogs, james, and sausage    ? Whole eggs and egg yolks    · Vegetables and fruits with added fat:      ? Fried vegetables or vegetables in butter or high-fat sauces, such as cream or cheese sauces    ? Fried fruit or fruit served with butter or cream    · Fats:      ? Butter, stick margarine, and shortening    ? Coconut, palm oil, and palm kernel oil    Foods to include:   · Grains:      ? Whole-grain breads, cereals, pasta, and brown rice    ? Low-fat crackers and pretzels    · Vegetables and fruits:      ? Fresh, frozen, or canned vegetables (no salt or low-sodium)    ? Fresh, frozen, dried, or canned fruit (canned in light syrup or fruit juice)    ? Avocado    · Low-fat dairy products:      ? Nonfat (skim) or 1% milk    ? Nonfat or low-fat cheese, yogurt, and cottage cheese    · Meats and proteins:      ? Chicken or turkey with no skin    ? Baked or broiled fish    ? Lean beef and pork (loin, round, extra lean hamburger)    ? Beans and peas, unsalted nuts, soy products    ? Egg whites and substitutes    ? Seeds and nuts    · Fats:      ? Unsaturated oil, such as canola, olive, peanut, soybean, or sunflower oil    ? Soft or liquid margarine and vegetable oil spread    ? Low-fat salad dressing    Other ways to decrease fat:   · Read food labels before you buy foods  Choose foods that have less than 30% of calories from fat  Choose low-fat or fat-free dairy products  Remember that fat free does not mean calorie free  These foods still contain calories, and too many calories can lead to weight gain  · Trim fat from meat and avoid fried food  Trim all visible fat from meat before you cook it  Remove the skin from poultry  Do not moss meat, fish, or poultry  Bake, roast, boil, or broil these foods instead  Avoid fried foods  Eat a baked potato instead of Western Kelly fries   Steam vegetables instead of sautéing them in butter  · Add less fat to foods  Use imitation james bits on salads and baked potatoes instead of regular james bits  Use fat-free or low-fat salad dressings instead of regular dressings  Use low-fat or nonfat butter-flavored topping instead of regular butter or margarine on popcorn and other foods  Ways to decrease fat in recipes:  Replace high-fat ingredients with low-fat or nonfat ones  This may cause baked goods to be drier than usual  You may need to use nonfat cooking spray on pans to prevent food from sticking  You also may need to change the amount of other ingredients, such as water, in the recipe  Try the following:  · Use low-fat or light margarine instead of regular margarine or shortening  · Use lean ground turkey breast or chicken, or lean ground beef (less than 5% fat) instead of hamburger  · Add 1 teaspoon of canola oil to 8 ounces of skim milk instead of using cream or half and half  · Use grated zucchini, carrots, or apples in breads instead of coconut  · Use blenderized, low-fat cottage cheese, plain tofu, or low-fat ricotta cheese instead of cream cheese  · Use 1 egg white and 1 teaspoon of canola oil, or use ¼ cup (2 ounces) of fat-free egg substitute instead of a whole egg  · Replace half of the oil that is called for in a recipe with applesauce when you bake  Use 3 tablespoons of cocoa powder and 1 tablespoon of canola oil instead of a square of baking chocolate  How to increase fiber:  Eat enough high-fiber foods to get 20 to 30 grams of fiber every day  Slowly increase your fiber intake to avoid stomach cramps, gas, and other problems  · Eat 3 ounces of whole-grain foods each day  An ounce is about 1 slice of bread  Eat whole-grain breads, such as whole-wheat bread  Whole wheat, whole-wheat flour, or other whole grains should be listed as the first ingredient on the food label   Replace white flour with whole-grain flour or use half of each in recipes  Whole-grain flour is heavier than white flour, so you may have to add more yeast or baking powder  · Eat a high-fiber cereal for breakfast   Oatmeal is a good source of soluble fiber  Look for cereals that have bran or fiber in the name  Choose whole-grain products, such as brown rice, barley, and whole-wheat pasta  · Eat more beans, peas, and lentils  For example, add beans to soups or salads  Eat at least 5 cups of fruits and vegetables each day  Eat fruits and vegetables with the peel because the peel is high in fiber  © Copyright [address] Information is for End User's use only and may not be sold, redistributed or otherwise used for commercial purposes  All illustrations and images included in CareNotes® are the copyrighted property of [employer]  or Maurice Yip  The above information is an  only  It is not intended as medical advice for individual conditions or treatments  Talk to your doctor, nurse or pharmacist before following any medical regimen to see if it is safe and effective for you

## 2021-02-02 ENCOUNTER — TELEPHONE (OUTPATIENT)
Dept: INTERNAL MEDICINE CLINIC | Facility: CLINIC | Age: 66
End: 2021-02-02

## 2021-02-02 NOTE — TELEPHONE ENCOUNTER
Pt was scheduled for today to establish with Dr Lilian Camp  Soonest 30 minute appointment I could reschedule her to was for 3/4/21  Pt was very unhappy, states she had her blood work done and would also like to address numbness in her lips  Soonest 15 minute spot was for 2/11, did book her in to hold the spot but is asking to be seen sooner  No openings available and does not want to see anyone else        [phone]

## 2021-02-03 ENCOUNTER — OFFICE VISIT (OUTPATIENT)
Dept: INTERNAL MEDICINE CLINIC | Facility: CLINIC | Age: 66
End: 2021-02-03
Payer: COMMERCIAL

## 2021-02-03 ENCOUNTER — LAB (OUTPATIENT)
Dept: LAB | Facility: CLINIC | Age: 66
End: 2021-02-03
Payer: COMMERCIAL

## 2021-02-03 VITALS
OXYGEN SATURATION: 100 % | HEART RATE: 88 BPM | SYSTOLIC BLOOD PRESSURE: 114 MMHG | TEMPERATURE: 98 F | DIASTOLIC BLOOD PRESSURE: 72 MMHG

## 2021-02-03 DIAGNOSIS — R53.82 CHRONIC FATIGUE: ICD-10-CM

## 2021-02-03 DIAGNOSIS — E78.2 MIXED HYPERLIPIDEMIA: ICD-10-CM

## 2021-02-03 DIAGNOSIS — R20.0 FACIAL NUMBNESS: Primary | ICD-10-CM

## 2021-02-03 DIAGNOSIS — G35 MULTIPLE SCLEROSIS (HCC): ICD-10-CM

## 2021-02-03 LAB
ERYTHROCYTE [DISTWIDTH] IN BLOOD BY AUTOMATED COUNT: 12.7 % (ref 11.6–15.1)
HCT VFR BLD AUTO: 37.8 % (ref 34.8–46.1)
HGB BLD-MCNC: 11.9 G/DL (ref 11.5–15.4)
MCH RBC QN AUTO: 29.8 PG (ref 26.8–34.3)
MCHC RBC AUTO-ENTMCNC: 31.5 G/DL (ref 31.4–37.4)
MCV RBC AUTO: 95 FL (ref 82–98)
PLATELET # BLD AUTO: 263 THOUSANDS/UL (ref 149–390)
PMV BLD AUTO: 9.7 FL (ref 8.9–12.7)
RBC # BLD AUTO: 3.99 MILLION/UL (ref 3.81–5.12)
WBC # BLD AUTO: 7.72 THOUSAND/UL (ref 4.31–10.16)

## 2021-02-03 PROCEDURE — 85027 COMPLETE CBC AUTOMATED: CPT

## 2021-02-03 PROCEDURE — 84443 ASSAY THYROID STIM HORMONE: CPT

## 2021-02-03 PROCEDURE — 3725F SCREEN DEPRESSION PERFORMED: CPT | Performed by: INTERNAL MEDICINE

## 2021-02-03 PROCEDURE — 80061 LIPID PANEL: CPT

## 2021-02-03 PROCEDURE — 99214 OFFICE O/P EST MOD 30 MIN: CPT | Performed by: INTERNAL MEDICINE

## 2021-02-03 PROCEDURE — 36415 COLL VENOUS BLD VENIPUNCTURE: CPT

## 2021-02-03 NOTE — PATIENT INSTRUCTIONS
Hyperlipidemia   AMBULATORY CARE:   Hyperlipidemia  is a high level of lipids (fats) in your blood  These lipids include cholesterol or triglycerides  Lipids are made by your body  They also come from the foods you eat  Your body needs lipids to work properly, but high levels increase your risk for heart disease, heart attack, and stroke  Call 911 for any of the following:   · You have any of the following signs of a heart attack:      ? Squeezing, pressure, or pain in your chest    ? You may  also have any of the following:     ? Discomfort or pain in your back, neck, jaw, stomach, or arm    ? Shortness of breath    ? Nausea or vomiting    ? Lightheadedness or a sudden cold sweat    · You have any of the following signs of a stroke:      ? Numbness or drooping on one side of your face     ? Weakness in an arm or leg    ? Confusion or difficulty speaking    ? Dizziness, a severe headache, or vision loss    Contact your healthcare provider if:   · You have questions or concerns about your condition or care  Treatment of hyperlipidemia  may first include lifestyle changes to help decrease your lipid levels  You may also need to take medicine to lower your lipid levels  Some of the lifestyle changes you may need to make include the following:  · Maintain a healthy weight  Ask your healthcare provider how much you should weigh  Ask him or her to help you create a weight loss plan if you are overweight  Weight loss can decrease your cholesterol and triglyceride levels  · Exercise as directed  Exercise lowers your cholesterol levels and helps you maintain a healthy weight  Get 30 minutes or more of aerobic exercise 4 to 6 days each week  You can split your exercise into four 10-minute workouts instead of 30 minutes at one time  Examples of aerobic exercises include walking briskly, swimming, or riding a bike  Work with your healthcare provider to plan the best exercise program for you  · Do not smoke  Nicotine and other chemicals in cigarettes and cigars can increase your risk for a heart attack and stroke  Ask your healthcare provider for information if you currently smoke and need help to quit  E-cigarettes or smokeless tobacco still contain nicotine  Talk to your healthcare provider before you use these products  · Eat heart-healthy foods  Talk to your dietitian about a heart-healthy diet  The following will help you manage hyperlipidemia:     ? Decrease the total amount of fat you eat  Choose lean meats, fat-free or 1% fat milk, and low-fat dairy products, such as yogurt and cheese  Limit or do not eat red meat  Red meats are high in fat and cholesterol  ? Replace unhealthy fats with healthy fats  Unhealthy fats include saturated fat, trans fat, and cholesterol  Choose soft margarines that are low in saturated fat and have little or no trans fat  Monounsaturated fats are healthy fats  These are found in olive oil, canola oil, avocado, and nuts  Polyunsaturated fats are also healthy  These are found in fish, flaxseed, walnuts, and soybeans  ? Eat 5 or more servings of fruits and vegetables every day  They are low in calories and fat and a good source of essential vitamins  Include dark green, red, and orange vegetables  Examples include spinach, kale, broccoli, and carrots  ? Eat foods high in fiber  Fiber can help lower your cholesterol levels  Choose whole grain, high-fiber foods  Good choices include whole-wheat breads or cereals, beans, peas, fruits, and vegetables  · Ask your healthcare provider if it is safe for you to drink alcohol  Alcohol can increase your cholesterol and triglyceride levels  A drink of alcohol is 12 ounces of beer, 5 ounces of wine, or 1½ ounces of liquor  Follow up with your healthcare provider as directed: You may need to return for more tests  Your healthcare provider may refer you to a dietitian   Write down your questions so you remember to ask them during your visits  © Copyright [address] Information is for End User's use only and may not be sold, redistributed or otherwise used for commercial purposes  All illustrations and images included in CareNotes® are the copyrighted property of [employer]  or [employer] Yip  The above information is an  only  It is not intended as medical advice for individual conditions or treatments  Talk to your doctor, nurse or pharmacist before following any medical regimen to see if it is safe and effective for you

## 2021-02-04 LAB
CHOLEST SERPL-MCNC: 219 MG/DL (ref 50–200)
HDLC SERPL-MCNC: 66 MG/DL
LDLC SERPL CALC-MCNC: 136 MG/DL (ref 0–100)
NONHDLC SERPL-MCNC: 153 MG/DL
TRIGL SERPL-MCNC: 86 MG/DL
TSH SERPL DL<=0.05 MIU/L-ACNC: 2.68 UIU/ML (ref 0.36–3.74)

## 2021-02-08 ENCOUNTER — LAB (OUTPATIENT)
Dept: LAB | Facility: CLINIC | Age: 66
End: 2021-02-08
Payer: COMMERCIAL

## 2021-02-08 ENCOUNTER — TRANSCRIBE ORDERS (OUTPATIENT)
Dept: LAB | Facility: CLINIC | Age: 66
End: 2021-02-08

## 2021-02-08 ENCOUNTER — TELEPHONE (OUTPATIENT)
Dept: INTERNAL MEDICINE CLINIC | Facility: CLINIC | Age: 66
End: 2021-02-08

## 2021-02-08 DIAGNOSIS — G58.9 ENTRAPMENT SYNDROME: Primary | ICD-10-CM

## 2021-02-08 DIAGNOSIS — G58.9 ENTRAPMENT SYNDROME: ICD-10-CM

## 2021-02-08 PROCEDURE — 86618 LYME DISEASE ANTIBODY: CPT

## 2021-02-08 PROCEDURE — 36415 COLL VENOUS BLD VENIPUNCTURE: CPT

## 2021-02-08 NOTE — TELEPHONE ENCOUNTER
I gave the pt the p# to reach [address] [place] [address] to verify the site where the test will be done  I also gave her the ref#    She has the MRI scheduled for Thurs Feb 11 at [facility] can only change the site; since she has member scheduling  The MRI is approved  2/7 to 8/6/21  I had chosen [facility]- she will be able to change the site

## 2021-02-09 ENCOUNTER — IMMUNIZATIONS (OUTPATIENT)
Dept: FAMILY MEDICINE CLINIC | Facility: HOSPITAL | Age: 66
End: 2021-02-09

## 2021-02-09 ENCOUNTER — TELEPHONE (OUTPATIENT)
Dept: INTERNAL MEDICINE CLINIC | Facility: CLINIC | Age: 66
End: 2021-02-09

## 2021-02-09 DIAGNOSIS — Z23 ENCOUNTER FOR IMMUNIZATION: Primary | ICD-10-CM

## 2021-02-09 LAB — B BURGDOR IGG+IGM SER-ACNC: 106

## 2021-02-09 PROCEDURE — 91301 SARS-COV-2 / COVID-19 MRNA VACCINE (MODERNA) 100 MCG: CPT

## 2021-02-09 PROCEDURE — 0011A SARS-COV-2 / COVID-19 MRNA VACCINE (MODERNA) 100 MCG: CPT

## 2021-02-09 NOTE — TELEPHONE ENCOUNTER
HAVING  MRI  DONE  Thursday  NUMBNESS  IN  HER  FACE/ GETTING  TESTED  FOR  MS     BUT  SHE  CAN  GET  THE  VACCINE  TODAY   AT 12;45  WANTS  TO KNOW  IF  THAT  IS  OK   CALL  PT  [phone]

## 2021-02-10 ENCOUNTER — TELEPHONE (OUTPATIENT)
Dept: INTERNAL MEDICINE CLINIC | Facility: CLINIC | Age: 66
End: 2021-02-10

## 2021-02-10 DIAGNOSIS — G35 MULTIPLE SCLEROSIS (HCC): ICD-10-CM

## 2021-02-10 DIAGNOSIS — R20.0 FACIAL NUMBNESS: Primary | ICD-10-CM

## 2021-02-10 NOTE — TELEPHONE ENCOUNTER
Pt informed of Dr Banegas message  Pt said she got a 2nd opinion and that doctor said she should get them both at the same time because of the dye and if she have lesions on her brain then she can have lesions down her spine

## 2021-02-10 NOTE — TELEPHONE ENCOUNTER
Patient is requesting a call back, getting MRI of brain tomorrow, recently saw another dr and they suggested she also has cervical MRI--she is unsure if you order this for her    Numbness in her face is getting worse, also has it on her chin & tongue    Call back # [phone]

## 2021-02-10 NOTE — TELEPHONE ENCOUNTER
Pt stated she was waiting for the [facility] doctor to get the approval  She already had both MRI ordered and scheduled for the same day

## 2021-02-16 ENCOUNTER — TELEPHONE (OUTPATIENT)
Dept: INTERNAL MEDICINE CLINIC | Facility: CLINIC | Age: 66
End: 2021-02-16

## 2021-02-16 DIAGNOSIS — R20.0 FACIAL NUMBNESS: Primary | ICD-10-CM

## 2021-02-16 NOTE — TELEPHONE ENCOUNTER
HAD  TWO  MRI   DONE    GETTING  TESTED  FOR  MS   SAID  THE  RESULTS  CAME  BACK  NEG  /  BUT  SHE  WANTS  TO  KNOW  WHAT  SHE  NEEDS  TO  DO  NEXT     CALL PT   [phone]

## 2021-02-17 ENCOUNTER — TELEPHONE (OUTPATIENT)
Dept: NEUROLOGY | Facility: CLINIC | Age: 66
End: 2021-02-17

## 2021-02-17 NOTE — TELEPHONE ENCOUNTER
Best contact number for patient: [phone]      Emergency Contact name and number: Willa Lockett [phone]     Referring provider and telephone number: [facility]     Primary Care Provider Name and if affiliated with [facility]:     Reason for Appointment/Dx: numbness     Neurology Location patient would like to be seen: [place]     Order received? Yes                                                 Records Received? Yes    Have you ever seen another Neurologist?       No    Insurance Information    Insurance Name:    ID/Policy #:    Secondary Insurance:    ID/Policy#: Workman's Comp/ Accident/ School  Information      Workman's Comp/Accident/School related?        No    If yes name of Insurance company:    Date of Injury:    Type of Injury:    [address] Name and Telephone Number:    Notes:                   Appointment date: 05/25/2021

## 2021-02-22 ENCOUNTER — TELEPHONE (OUTPATIENT)
Dept: INTERNAL MEDICINE CLINIC | Facility: CLINIC | Age: 66
End: 2021-02-22

## 2021-02-22 ENCOUNTER — TELEMEDICINE (OUTPATIENT)
Dept: INTERNAL MEDICINE CLINIC | Facility: CLINIC | Age: 66
End: 2021-02-22
Payer: COMMERCIAL

## 2021-02-22 DIAGNOSIS — R53.82 CHRONIC FATIGUE: ICD-10-CM

## 2021-02-22 DIAGNOSIS — R20.0 FACIAL NUMBNESS: Primary | ICD-10-CM

## 2021-02-22 DIAGNOSIS — H04.123 DRY EYES: ICD-10-CM

## 2021-02-22 DIAGNOSIS — R68.2 DRY MOUTH: ICD-10-CM

## 2021-02-22 PROCEDURE — 1036F TOBACCO NON-USER: CPT | Performed by: INTERNAL MEDICINE

## 2021-02-22 PROCEDURE — 99213 OFFICE O/P EST LOW 20 MIN: CPT | Performed by: INTERNAL MEDICINE

## 2021-02-22 NOTE — PROGRESS NOTES
Virtual Regular Visit      Assessment/Plan:    Problem List Items Addressed This Visit        Other    Facial numbness - Primary    Relevant Orders    VANESSA Screen w/ Reflex to Titer/Pattern    C-reactive protein    Sedimentation rate, automated    Anti-DNA antibody, double-stranded    Sjogren's Antibodies    Anti-neutrophilic cytoplasmic antibody    Chronic fatigue    Relevant Orders    VANESSA Screen w/ Reflex to Titer/Pattern    C-reactive protein    Sedimentation rate, automated    Anti-DNA antibody, double-stranded    Sjogren's Antibodies    Anti-neutrophilic cytoplasmic antibody      Other Visit Diagnoses     Dry mouth        Relevant Orders    VANESSA Screen w/ Reflex to Titer/Pattern    C-reactive protein    Sedimentation rate, automated    Anti-DNA antibody, double-stranded    Sjogren's Antibodies    Anti-neutrophilic cytoplasmic antibody    Dry eyes        Relevant Orders    VANESSA Screen w/ Reflex to Titer/Pattern    C-reactive protein    Sedimentation rate, automated    Anti-DNA antibody, double-stranded    Sjogren's Antibodies    Anti-neutrophilic cytoplasmic antibody        Still unclear etiology to her symptoms  Further lab work as ordered to look for underlying autoimmune disease  Discussed this is less likely, but she did have an indeterminate ds DNA antibody in past  Add on Sjogren's panel due to dry eyes/dry mouth         Reason for visit is   Chief Complaint   Patient presents with    Numbness    Virtual Regular Visit        Encounter provider Shmuel Watson DO    Provider located at [address] [place] [address]      Recent Visits  Date Type Provider Dept   02/16/21 Telephone [facility] recent visits within past 7 days and meeting all other requirements     Today's Visits  Date Type Provider Dept   02/22/21 Telephone Quintin Hui   02/22/21 Telemedicine Denise Angeles Rocky Jayson   02/22/21 [address]phone [address]  today's visits and meeting all other requirements     Future Appointments  No visits were found meeting these conditions  Showing future appointments within next 150 days and meeting all other requirements        The patient was identified by name and date of birth  Lance Christine was informed that this is a telemedicine visit and that the visit is being conducted through [facility] and patient was informed that this is not a secure, HIPAA-compliant platform  She agrees to proceed     My office door was closed  No one else was in the room  She acknowledged consent and understanding of privacy and security of the video platform  The patient has agreed to participate and understands they can discontinue the visit at any time  Patient is aware this is a billable service  Subjective  Lance Christine is a 60 y o  female who continues to experiences episodes of facial numbness  States the numbness travels and is migratory from one side of her face to the other  No evidence of MS on MRI Brain/C-spine  Her daughter is a nutritionist and mentioned auto-immune disease as a cause  States she also has numbness tip of right thumb and in her toes  She experiences dry eyes/dry mouth and face gets red at times  No joint swelling currently  Had an indeterminate ds DNA antibody in the past (2018/2019)  Numbness sensation in her face has been going on 2 months  Past Medical History:   Diagnosis Date    Atrial fibrillation ([facility] [address] ) 2/6/2014    Situational anxiety     Situational insomnia        History reviewed  No pertinent surgical history      Current Outpatient Medications   Medication Sig Dispense Refill    Biotin 1 MG CAPS Take by mouth daily       calcium carbonate (OS-RAYMUNDO) 600 MG tablet Take 600 mg by mouth 2 (two) times a day with meals      cholecalciferol (VITAMIN D3) 1,000 units tablet Take 5,000 Units by mouth daily       glucosamine-chondroitin 500-400 MG tablet Take 1 tablet by mouth daily      LORazepam (ATIVAN) 0 5 mg tablet Take 1 tablet (0 5 mg total) by mouth daily as needed for anxiety 30 tablet 0    magnesium gluconate (MAGONATE) 500 mg tablet Take 500 mg by mouth 2 (two) times a day      MELATONIN ER PO Take 0 5 mg by mouth daily      multivitamin (THERAGRAN) TABS Take 1 tablet by mouth daily      QUERCETIN PO Take by mouth daily      zolpidem (AMBIEN) 10 mg tablet Take 1 tablet (10 mg total) by mouth daily at bedtime as needed for sleep 30 tablet 0    amoxicillin (AMOXIL) 500 MG tablet Take 500 mg by mouth 2 (two) times a day       No current facility-administered medications for this visit  No Known Allergies    Review of Systems   Constitutional: Positive for fatigue  Negative for chills and fever  Respiratory: Negative  Cardiovascular: Negative  Gastrointestinal: Negative  Skin: Positive for color change (facial redness)  Neurological: Positive for numbness  Negative for weakness  Video Exam    There were no vitals filed for this visit  Physical Exam  Constitutional:       General: She is not in acute distress  Appearance: She is well-developed  She is not diaphoretic  Eyes:      General: No scleral icterus  Right eye: No discharge  Left eye: No discharge  Conjunctiva/sclera: Conjunctivae normal    Pulmonary:      Effort: Pulmonary effort is normal  No respiratory distress  Neurological:      Mental Status: She is alert and oriented to person, place, and time  Psychiatric:         Behavior: Behavior normal         I spent 15 minutes directly with the patient during this visit      [address] [facility] acknowledges that she has consented to an online visit or consultation   She understands that the online visit is based solely on information provided by her, and that, in the absence of a face-to-face physical evaluation by the physician, the diagnosis she receives is both limited and provisional in terms of accuracy and completeness  This is not intended to replace a full medical face-to-face evaluation by the physician  Adrien Cooper understands and accepts these terms

## 2021-02-23 ENCOUNTER — LAB (OUTPATIENT)
Dept: LAB | Facility: CLINIC | Age: 66
End: 2021-02-23
Payer: COMMERCIAL

## 2021-02-23 DIAGNOSIS — H04.123 DRY EYES: ICD-10-CM

## 2021-02-23 DIAGNOSIS — R53.82 CHRONIC FATIGUE: ICD-10-CM

## 2021-02-23 DIAGNOSIS — R20.0 FACIAL NUMBNESS: ICD-10-CM

## 2021-02-23 DIAGNOSIS — R68.2 DRY MOUTH: ICD-10-CM

## 2021-02-23 LAB
CRP SERPL QL: <3 MG/L
ERYTHROCYTE [SEDIMENTATION RATE] IN BLOOD: 17 MM/HOUR (ref 0–29)

## 2021-02-23 PROCEDURE — 86038 ANTINUCLEAR ANTIBODIES: CPT

## 2021-02-23 PROCEDURE — 86225 DNA ANTIBODY NATIVE: CPT

## 2021-02-23 PROCEDURE — 85652 RBC SED RATE AUTOMATED: CPT

## 2021-02-23 PROCEDURE — 86140 C-REACTIVE PROTEIN: CPT

## 2021-02-23 PROCEDURE — 36415 COLL VENOUS BLD VENIPUNCTURE: CPT

## 2021-02-23 PROCEDURE — 86235 NUCLEAR ANTIGEN ANTIBODY: CPT

## 2021-02-23 PROCEDURE — 86255 FLUORESCENT ANTIBODY SCREEN: CPT

## 2021-02-24 LAB
DSDNA AB SER-ACNC: 5 IU/ML (ref 0–9)
ENA SS-A AB SER-ACNC: <0.2 AI (ref 0–0.9)
ENA SS-B AB SER-ACNC: <0.2 AI (ref 0–0.9)
RYE IGE QN: NEGATIVE

## 2021-02-25 LAB
C-ANCA TITR SER IF: NORMAL TITER
MYELOPEROXIDASE AB SER IA-ACNC: <9 U/ML (ref 0–9)
P-ANCA ATYPICAL TITR SER IF: NORMAL TITER
P-ANCA TITR SER IF: NORMAL TITER
PROTEINASE3 AB SER IA-ACNC: <3.5 U/ML (ref 0–3.5)

## 2021-03-01 ENCOUNTER — TELEPHONE (OUTPATIENT)
Dept: NEUROLOGY | Facility: CLINIC | Age: 66
End: 2021-03-01

## 2021-03-01 NOTE — TELEPHONE ENCOUNTER
Spoke with patient to offer a sooner appointment with Dr Delaney Weber on 3/2/21 at 2:00, patient declined

## 2021-03-08 ENCOUNTER — TELEPHONE (OUTPATIENT)
Dept: NEUROLOGY | Facility: CLINIC | Age: 66
End: 2021-03-08

## 2021-03-09 ENCOUNTER — IMMUNIZATIONS (OUTPATIENT)
Dept: FAMILY MEDICINE CLINIC | Facility: HOSPITAL | Age: 66
End: 2021-03-09

## 2021-03-09 DIAGNOSIS — Z23 ENCOUNTER FOR IMMUNIZATION: Primary | ICD-10-CM

## 2021-03-09 PROCEDURE — 91301 SARS-COV-2 / COVID-19 MRNA VACCINE (MODERNA) 100 MCG: CPT

## 2021-03-09 PROCEDURE — 0012A SARS-COV-2 / COVID-19 MRNA VACCINE (MODERNA) 100 MCG: CPT

## 2021-04-05 ENCOUNTER — TELEPHONE (OUTPATIENT)
Dept: NEUROLOGY | Facility: CLINIC | Age: 66
End: 2021-04-05

## 2021-04-05 NOTE — TELEPHONE ENCOUNTER
Voicemail full, unable to LM to patient to offer a sooner appointment with Dr Maninder Carrion on 4/5/21 at 11:00 am or 3:00 pm

## 2021-04-15 ENCOUNTER — APPOINTMENT (OUTPATIENT)
Dept: LAB | Facility: CLINIC | Age: 66
End: 2021-04-15
Payer: COMMERCIAL

## 2021-04-15 ENCOUNTER — TRANSCRIBE ORDERS (OUTPATIENT)
Dept: LAB | Facility: CLINIC | Age: 66
End: 2021-04-15

## 2021-04-15 DIAGNOSIS — G62.9 SMALL FIBER NEUROPATHY: ICD-10-CM

## 2021-04-15 DIAGNOSIS — G62.9 SMALL FIBER NEUROPATHY: Primary | ICD-10-CM

## 2021-04-15 LAB
EST. AVERAGE GLUCOSE BLD GHB EST-MCNC: 111 MG/DL
HBA1C MFR BLD: 5.5 %

## 2021-04-15 PROCEDURE — 86038 ANTINUCLEAR ANTIBODIES: CPT

## 2021-04-15 PROCEDURE — 86618 LYME DISEASE ANTIBODY: CPT

## 2021-04-15 PROCEDURE — 84425 ASSAY OF VITAMIN B-1: CPT

## 2021-04-15 PROCEDURE — 36415 COLL VENOUS BLD VENIPUNCTURE: CPT

## 2021-04-15 PROCEDURE — 82525 ASSAY OF COPPER: CPT

## 2021-04-15 PROCEDURE — 84165 PROTEIN E-PHORESIS SERUM: CPT | Performed by: PATHOLOGY

## 2021-04-15 PROCEDURE — 86235 NUCLEAR ANTIGEN ANTIBODY: CPT

## 2021-04-15 PROCEDURE — 84207 ASSAY OF VITAMIN B-6: CPT

## 2021-04-15 PROCEDURE — 84165 PROTEIN E-PHORESIS SERUM: CPT

## 2021-04-15 PROCEDURE — 83036 HEMOGLOBIN GLYCOSYLATED A1C: CPT

## 2021-04-16 LAB
ALBUMIN SERPL ELPH-MCNC: 4.13 G/DL (ref 3.5–5)
ALBUMIN SERPL ELPH-MCNC: 60.7 % (ref 52–65)
ALPHA1 GLOB SERPL ELPH-MCNC: 0.29 G/DL (ref 0.1–0.4)
ALPHA1 GLOB SERPL ELPH-MCNC: 4.3 % (ref 2.5–5)
ALPHA2 GLOB SERPL ELPH-MCNC: 0.73 G/DL (ref 0.4–1.2)
ALPHA2 GLOB SERPL ELPH-MCNC: 10.8 % (ref 7–13)
B BURGDOR IGG+IGM SER-ACNC: 111
BETA GLOB ABNORMAL SERPL ELPH-MCNC: 0.42 G/DL (ref 0.4–0.8)
BETA1 GLOB SERPL ELPH-MCNC: 6.2 % (ref 5–13)
BETA2 GLOB SERPL ELPH-MCNC: 4.7 % (ref 2–8)
BETA2+GAMMA GLOB SERPL ELPH-MCNC: 0.32 G/DL (ref 0.2–0.5)
ENA RNP AB SER-ACNC: 0.2 AI (ref 0–0.9)
ENA SM AB SER-ACNC: <0.2 AI (ref 0–0.9)
GAMMA GLOB ABNORMAL SERPL ELPH-MCNC: 0.9 G/DL (ref 0.5–1.6)
GAMMA GLOB SERPL ELPH-MCNC: 13.3 % (ref 12–22)
IGG/ALB SER: 1.54 {RATIO} (ref 1.1–1.8)
PROT PATTERN SERPL ELPH-IMP: NORMAL
PROT SERPL-MCNC: 6.8 G/DL (ref 6.4–8.2)
RYE IGE QN: NEGATIVE

## 2021-04-18 LAB — COPPER SERPL-MCNC: 135 UG/DL (ref 80–158)

## 2021-04-22 LAB — VIT B6 SERPL-MCNC: 5.4 UG/L (ref 2–32.8)

## 2021-04-23 LAB — VIT B1 BLD-SCNC: 113.2 NMOL/L (ref 66.5–200)

## 2021-06-04 DIAGNOSIS — F41.9 ANXIETY: ICD-10-CM

## 2021-06-04 DIAGNOSIS — G47.00 INSOMNIA, UNSPECIFIED TYPE: ICD-10-CM

## 2021-06-04 RX ORDER — LORAZEPAM 0.5 MG/1
0.5 TABLET ORAL DAILY PRN
Qty: 30 TABLET | Refills: 3 | Status: SHIPPED | OUTPATIENT
Start: 2021-06-04 | End: 2021-09-21 | Stop reason: SDUPTHER

## 2021-06-04 RX ORDER — ZOLPIDEM TARTRATE 10 MG/1
10 TABLET ORAL
Qty: 30 TABLET | Refills: 3 | Status: SHIPPED | OUTPATIENT
Start: 2021-06-04 | End: 2021-09-21 | Stop reason: SDUPTHER

## 2021-06-04 NOTE — TELEPHONE ENCOUNTER
Controlled Substance Review    [place] or [place]  reviewed: No red flags were identified; safe to proceed with prescription      PDMP Review       Value Time User    PDMP Reviewed  Yes 6/4/2021 10:25 AM Osvaldo Medel DO

## 2021-06-04 NOTE — TELEPHONE ENCOUNTER
PT called would like refill of :  zolpidem (AMBIEN) 10 mg tablet   LORazepam (ATIVAN) 0 5 mg tablet     Pharm :   [id number] [place] - [address]   [facility]    Phone:  [phone]     PT # [phone]

## 2021-07-30 ENCOUNTER — RA CDI HCC (OUTPATIENT)
Dept: OTHER | Facility: HOSPITAL | Age: 66
End: 2021-07-30

## 2021-09-08 ENCOUNTER — RA CDI HCC (OUTPATIENT)
Dept: OTHER | Facility: HOSPITAL | Age: 66
End: 2021-09-08

## 2021-09-08 NOTE — PROGRESS NOTES
[facility]  coding opportunities       Chart reviewed, no opportunity found: CHART REVIEWED, NO OPPORTUNITY FOUND                        Patients insurance company: [employer] (Medicare and Commercial for [employer] and [employer])

## 2021-09-21 ENCOUNTER — TELEPHONE (OUTPATIENT)
Dept: INTERNAL MEDICINE CLINIC | Facility: CLINIC | Age: 66
End: 2021-09-21

## 2021-09-21 ENCOUNTER — OFFICE VISIT (OUTPATIENT)
Dept: INTERNAL MEDICINE CLINIC | Facility: CLINIC | Age: 66
End: 2021-09-21
Payer: MEDICARE

## 2021-09-21 ENCOUNTER — APPOINTMENT (OUTPATIENT)
Dept: LAB | Facility: CLINIC | Age: 66
End: 2021-09-21
Payer: MEDICARE

## 2021-09-21 VITALS
OXYGEN SATURATION: 97 % | HEART RATE: 84 BPM | TEMPERATURE: 99 F | DIASTOLIC BLOOD PRESSURE: 70 MMHG | SYSTOLIC BLOOD PRESSURE: 118 MMHG

## 2021-09-21 DIAGNOSIS — R20.0 FACIAL NUMBNESS: ICD-10-CM

## 2021-09-21 DIAGNOSIS — E78.2 MIXED HYPERLIPIDEMIA: ICD-10-CM

## 2021-09-21 DIAGNOSIS — Z11.59 NEED FOR HEPATITIS C SCREENING TEST: ICD-10-CM

## 2021-09-21 DIAGNOSIS — Z00.00 WELCOME TO MEDICARE PREVENTIVE VISIT: ICD-10-CM

## 2021-09-21 DIAGNOSIS — F41.9 ANXIETY: ICD-10-CM

## 2021-09-21 DIAGNOSIS — G47.00 INSOMNIA, UNSPECIFIED TYPE: ICD-10-CM

## 2021-09-21 DIAGNOSIS — E78.2 MIXED HYPERLIPIDEMIA: Primary | ICD-10-CM

## 2021-09-21 DIAGNOSIS — I87.2 CHRONIC VENOUS INSUFFICIENCY: ICD-10-CM

## 2021-09-21 LAB
ANION GAP SERPL CALCULATED.3IONS-SCNC: 2 MMOL/L (ref 4–13)
BUN SERPL-MCNC: 20 MG/DL (ref 5–25)
CALCIUM SERPL-MCNC: 9.3 MG/DL (ref 8.3–10.1)
CHLORIDE SERPL-SCNC: 108 MMOL/L (ref 100–108)
CHOLEST SERPL-MCNC: 244 MG/DL (ref 50–200)
CO2 SERPL-SCNC: 28 MMOL/L (ref 21–32)
CREAT SERPL-MCNC: 0.7 MG/DL (ref 0.6–1.3)
ERYTHROCYTE [DISTWIDTH] IN BLOOD BY AUTOMATED COUNT: 13.2 % (ref 11.6–15.1)
GFR SERPL CREATININE-BSD FRML MDRD: 91 ML/MIN/1.73SQ M
GLUCOSE P FAST SERPL-MCNC: 91 MG/DL (ref 65–99)
HCT VFR BLD AUTO: 41.2 % (ref 34.8–46.1)
HCV AB SER QL: NORMAL
HDLC SERPL-MCNC: 73 MG/DL
HGB BLD-MCNC: 13 G/DL (ref 11.5–15.4)
LDLC SERPL CALC-MCNC: 162 MG/DL (ref 0–100)
MCH RBC QN AUTO: 29.6 PG (ref 26.8–34.3)
MCHC RBC AUTO-ENTMCNC: 31.6 G/DL (ref 31.4–37.4)
MCV RBC AUTO: 94 FL (ref 82–98)
NONHDLC SERPL-MCNC: 171 MG/DL
PLATELET # BLD AUTO: 280 THOUSANDS/UL (ref 149–390)
PMV BLD AUTO: 9.9 FL (ref 8.9–12.7)
POTASSIUM SERPL-SCNC: 4.7 MMOL/L (ref 3.5–5.3)
RBC # BLD AUTO: 4.39 MILLION/UL (ref 3.81–5.12)
SODIUM SERPL-SCNC: 138 MMOL/L (ref 136–145)
TRIGL SERPL-MCNC: 46 MG/DL
WBC # BLD AUTO: 5.57 THOUSAND/UL (ref 4.31–10.16)

## 2021-09-21 PROCEDURE — 99214 OFFICE O/P EST MOD 30 MIN: CPT | Performed by: INTERNAL MEDICINE

## 2021-09-21 PROCEDURE — 85027 COMPLETE CBC AUTOMATED: CPT

## 2021-09-21 PROCEDURE — 80061 LIPID PANEL: CPT

## 2021-09-21 PROCEDURE — 36415 COLL VENOUS BLD VENIPUNCTURE: CPT

## 2021-09-21 PROCEDURE — G0402 INITIAL PREVENTIVE EXAM: HCPCS | Performed by: INTERNAL MEDICINE

## 2021-09-21 PROCEDURE — G0403 EKG FOR INITIAL PREVENT EXAM: HCPCS | Performed by: INTERNAL MEDICINE

## 2021-09-21 PROCEDURE — 86803 HEPATITIS C AB TEST: CPT

## 2021-09-21 PROCEDURE — 1123F ACP DISCUSS/DSCN MKR DOCD: CPT | Performed by: INTERNAL MEDICINE

## 2021-09-21 PROCEDURE — 80048 BASIC METABOLIC PNL TOTAL CA: CPT

## 2021-09-21 RX ORDER — LORAZEPAM 0.5 MG/1
0.5 TABLET ORAL DAILY PRN
Qty: 30 TABLET | Refills: 3 | Status: SHIPPED | OUTPATIENT
Start: 2021-09-21 | End: 2022-01-03 | Stop reason: SDUPTHER

## 2021-09-21 RX ORDER — ZOLPIDEM TARTRATE 10 MG/1
10 TABLET ORAL
Qty: 30 TABLET | Refills: 3 | Status: SHIPPED | OUTPATIENT
Start: 2021-09-21 | End: 2022-03-24 | Stop reason: SDUPTHER

## 2021-09-21 NOTE — PATIENT INSTRUCTIONS
Medicare Preventive Visit Patient Instructions  Thank you for completing your Welcome to Medicare Visit or Medicare Annual Wellness Visit today  Your next wellness visit will be due in one year (9/22/2022)  The screening/preventive services that you may require over the next 5-10 years are detailed below  Some tests may not apply to you based off risk factors and/or age  Screening tests ordered at today's visit but not completed yet may show as past due  Also, please note that scanned in results may not display below  Preventive Screenings:  Service Recommendations Previous Testing/Comments   Colorectal Cancer Screening  * Colonoscopy    * Fecal Occult Blood Test (FOBT)/Fecal Immunochemical Test (FIT)  * Fecal DNA/Cologuard Test  * Flexible Sigmoidoscopy Age: 54-65 years old   Colonoscopy: every 10 years (may be performed more frequently if at higher risk)  OR  FOBT/FIT: every 1 year  OR  Cologuard: every 3 years  OR  Sigmoidoscopy: every 5 years  Screening may be recommended earlier than age 48 if at higher risk for colorectal cancer  Also, an individualized decision between you and your healthcare provider will decide whether screening between the ages of 74-80 would be appropriate  Colonoscopy: Not on file  FOBT/FIT: Not on file  Cologuard: 02/19/2020  Sigmoidoscopy: Not on file    Screening Current     Breast Cancer Screening Age: 36 years old  Frequency: every 1-2 years  Not required if history of left and right mastectomy Mammogram: 08/26/2020    Screening Current   Cervical Cancer Screening Between the ages of 21-29, pap smear recommended once every 3 years  Between the ages of 33-67, can perform pap smear with HPV co-testing every 5 years     Recommendations may differ for women with a history of total hysterectomy, cervical cancer, or abnormal pap smears in past  Pap Smear: 06/15/2017    Screening Not Indicated   Hepatitis C Screening Once for adults born between 1945 and 1965  More frequently in patients at high risk for Hepatitis C Hep C Antibody: Not on file    Risks and Benefits Discussed  Due for Hepatitis C screening   Diabetes Screening 1-2 times per year if you're at risk for diabetes or have pre-diabetes Fasting glucose: 97 mg/dL   A1C: 5 5 %    Screening Current   Cholesterol Screening Once every 5 years if you don't have a lipid disorder  May order more often based on risk factors  Lipid panel: 02/03/2021    Screening Not Indicated  History Lipid Disorder     Other Preventive Screenings Covered by Medicare:  1  Abdominal Aortic Aneurysm (AAA) Screening: covered once if your at risk  You're considered to be at risk if you have a family history of AAA  2  Lung Cancer Screening: covers low dose CT scan once per year if you meet all of the following conditions: (1) Age 50-69; (2) No signs or symptoms of lung cancer; (3) Current smoker or have quit smoking within the last 15 years; (4) You have a tobacco smoking history of at least 30 pack years (packs per day multiplied by number of years you smoked); (5) You get a written order from a healthcare provider  3  Glaucoma Screening: covered annually if you're considered high risk: (1) You have diabetes OR (2) Family history of glaucoma OR (3)  aged 48 and older OR (3)  American aged 72 and older  3  Osteoporosis Screening: covered every 2 years if you meet one of the following conditions: (1) You're estrogen deficient and at risk for osteoporosis based off medical history and other findings; (2) Have a vertebral abnormality; (3) On glucocorticoid therapy for more than 3 months; (4) Have primary hyperparathyroidism; (5) On osteoporosis medications and need to assess response to drug therapy  · Last bone density test (DXA Scan): Not on file  5  HIV Screening: covered annually if you're between the age of 12-76  Also covered annually if you are younger than 13 and older than 72 with risk factors for HIV infection   For pregnant patients, it is covered up to 3 times per pregnancy  Immunizations:  Immunization Recommendations   Influenza Vaccine Annual influenza vaccination during flu season is recommended for all persons aged >= 6 months who do not have contraindications   Pneumococcal Vaccine (Prevnar and Pneumovax)  * Prevnar = PCV13  * Pneumovax = PPSV23   Adults 25-60 years old: 1-3 doses may be recommended based on certain risk factors  Adults 72 years old: Prevnar (PCV13) vaccine recommended followed by Pneumovax (PPSV23) vaccine  If already received PPSV23 since turning 65, then PCV13 recommended at least one year after PPSV23 dose  Hepatitis B Vaccine 3 dose series if at intermediate or high risk (ex: diabetes, end stage renal disease, liver disease)   Tetanus (Td) Vaccine - COST NOT COVERED BY MEDICARE PART B Following completion of primary series, a booster dose should be given every 10 years to maintain immunity against tetanus  Td may also be given as tetanus wound prophylaxis  Tdap Vaccine - COST NOT COVERED BY MEDICARE PART B Recommended at least once for all adults  For pregnant patients, recommended with each pregnancy  Shingles Vaccine (Shingrix) - COST NOT COVERED BY MEDICARE PART B  2 shot series recommended in those aged 48 and above     Health Maintenance Due:      Topic Date Due    Hepatitis C Screening  Never done    DXA SCAN  Never done    HIV Screening  Never done    Breast Cancer Screening: Mammogram  08/26/2021    Colorectal Cancer Screening  02/19/2023     Immunizations Due:      Topic Date Due    Pneumococcal Vaccine: 65+ Years (1 of 1 - PPSV23) 08/28/2021    Influenza Vaccine (1) 09/01/2021     Advance Directives   What are advance directives? Advance directives are legal documents that state your wishes and plans for medical care  These plans are made ahead of time in case you lose your ability to make decisions for yourself   Advance directives can apply to any medical decision, such as the treatments you want, and if you want to donate organs  What are the types of advance directives? There are many types of advance directives, and each state has rules about how to use them  You may choose a combination of any of the following:  · Living will: This is a written record of the treatment you want  You can also choose which treatments you do not want, which to limit, and which to stop at a certain time  This includes surgery, medicine, IV fluid, and tube feedings  · Durable power of  for healthcare [facility]): This is a written record that states who you want to make healthcare choices for you when you are unable to make them for yourself  This person, called a proxy, is usually a family member or a friend  You may choose more than 1 proxy  · Do not resuscitate (DNR) order:  A DNR order is used in case your heart stops beating or you stop breathing  It is a request not to have certain forms of treatment, such as CPR  A DNR order may be included in other types of advance directives  · Medical directive: This covers the care that you want if you are in a coma, near death, or unable to make decisions for yourself  You can list the treatments you want for each condition  Treatment may include pain medicine, surgery, blood transfusions, dialysis, IV or tube feedings, and a ventilator (breathing machine)  · Values history: This document has questions about your views, beliefs, and how you feel and think about life  This information can help others choose the care that you would choose  Why are advance directives important? An advance directive helps you control your care  Although spoken wishes may be used, it is better to have your wishes written down  Spoken wishes can be misunderstood, or not followed  Treatments may be given even if you do not want them  An advance directive may make it easier for your family to make difficult choices about your care     Weight Management   Why it is important to manage your weight:  Being overweight increases your risk of health conditions such as heart disease, high blood pressure, type 2 diabetes, and certain types of cancer  It can also increase your risk for osteoarthritis, sleep apnea, and other respiratory problems  Aim for a slow, steady weight loss  Even a small amount of weight loss can lower your risk of health problems  How to lose weight safely:  A safe and healthy way to lose weight is to eat fewer calories and get regular exercise  You can lose up about 1 pound a week by decreasing the number of calories you eat by 500 calories each day  Healthy meal plan for weight management:  A healthy meal plan includes a variety of foods, contains fewer calories, and helps you stay healthy  A healthy meal plan includes the following:  · Eat whole-grain foods more often  A healthy meal plan should contain fiber  Fiber is the part of grains, fruits, and vegetables that is not broken down by your body  Whole-grain foods are healthy and provide extra fiber in your diet  Some examples of whole-grain foods are whole-wheat breads and pastas, oatmeal, brown rice, and bulgur  · Eat a variety of vegetables every day  Include dark, leafy greens such as spinach, kale, callum greens, and mustard greens  Eat yellow and orange vegetables such as carrots, sweet potatoes, and winter squash  · Eat a variety of fruits every day  Choose fresh or canned fruit (canned in its own juice or light syrup) instead of juice  Fruit juice has very little or no fiber  · Eat low-fat dairy foods  Drink fat-free (skim) milk or 1% milk  Eat fat-free yogurt and low-fat cottage cheese  Try low-fat cheeses such as mozzarella and other reduced-fat cheeses  · Choose meat and other protein foods that are low in fat  Choose beans or other legumes such as split peas or lentils  Choose fish, skinless poultry (chicken or turkey), or lean cuts of red meat (beef or pork)   Before you cook meat or poultry, cut off any visible fat  · Use less fat and oil  Try baking foods instead of frying them  Add less fat, such as margarine, sour cream, regular salad dressing and mayonnaise to foods  Eat fewer high-fat foods  Some examples of high-fat foods include french fries, doughnuts, ice cream, and cakes  · Eat fewer sweets  Limit foods and drinks that are high in sugar  This includes candy, cookies, regular soda, and sweetened drinks  Exercise:  Exercise at least 30 minutes per day on most days of the week  Some examples of exercise include walking, biking, dancing, and swimming  You can also fit in more physical activity by taking the stairs instead of the elevator or parking farther away from stores  Ask your healthcare provider about the best exercise plan for you  © Copyright [employer] 2018 Information is for End User's use only and may not be sold, redistributed or otherwise used for commercial purposes   All illustrations and images included in CareNotes® are the copyrighted property of A D A M , Inc  or [address] [place] [address]

## 2021-09-21 NOTE — TELEPHONE ENCOUNTER
----- Message from Aaron Montoya DO sent at 9/21/2021  3:19 PM EDT -----  Labs look great except cholesterol has risen  Watch fat/cholesterol intake and continue to stay active

## 2021-09-21 NOTE — PROGRESS NOTES
Assessment and Plan:     1  Mixed hyperlipidemia    Continue diet and exercise  Check UTD lipid panel     - CBC; Future  - Basic metabolic panel; Future  - Lipid panel; Future    2  Facial numbness    Unclear cause at this time  Symptoms have improved  No evidence of lyme, nutritional deficiency, autoimmune disease  No indication for nerve medications  3  Chronic venous insufficiency    Stay active, avoid excess salt, wear compression stockings  - VAS reflux lower limb venous duplex study with reflux assessment, complete bilateral; Future    4  Welcome to Medicare preventive visit    5  Need for hepatitis C screening test  - Hepatitis C antibody; Future     BMI Counseling: There is no height or weight on file to calculate BMI  The BMI is above normal  Exercise recommendations include exercising 3-5 times per week  Rationale for BMI follow-up plan is due to patient being overweight or obese  I am happy with her weight and would not consider a BMI of 25 06 as being overweight  She should continue to stay active as she is doing  She is eating healthy  Depression Screening and Follow-up Plan:   Patient was screened for depression during today's encounter  They screened negative with a PHQ-2 score of 0  Preventive health issues were discussed with patient, and age appropriate screening tests were ordered as noted in patient's After Visit Summary  Personalized health advice and appropriate referrals for health education or preventive services given if needed, as noted in patient's After Visit Summary  History of Present Illness:     Patient presents for Welcome to Medicare Visit    Still having periods of numbness, especially on the face  Have gotten better, but just an annoying thing  She saw [facility] neurology and no significant findings on work-up  Cause of her symptoms is unknown, but symptoms are not bad enough to need nerve medication  Otherwise she is feeling well   She has mammogram scheduled soon  She also saw her GYN who ordered a DXA scan  She is worried about swelling and varicose veins in her legs  Patient Care Team:  Elisabet Gay DO as PCP - General (Internal Medicine)     Review of Systems:     Review of Systems   Constitutional: Positive for fatigue  Negative for chills and fever  Respiratory: Negative  Cardiovascular: Positive for leg swelling  Negative for chest pain and palpitations  Gastrointestinal: Negative  Neurological: Positive for numbness (facial)  Negative for tremors and weakness  Problem List:     Patient Active Problem List   Diagnosis    Mixed hyperlipidemia    Thyroid nodule    Chronic fatigue    Solar degeneration    Pain in joint, lower leg    Plantar fasciitis    Peripheral neuropathy    Lower extremity tendinopathy    Generalized anxiety disorder    Degenerative joint disease of left ankle and foot    Overweight    Facial numbness      Past Medical and Surgical History:     Past Medical History:   Diagnosis Date    [facility] 2[facility]6/2014    Situational anxiety     Situational insomnia      History reviewed  No pertinent surgical history     Family History:     Family History   Problem Relation Age of Onset    Hypertension Mother     Celiac disease Father     Other Father         Pacemaker placement    Heart disease Father     Hypertension Father     Prostate cancer Father     Dementia Father     Celiac disease Child     Other Family         Congenital heart defect      Social History:     Social History     Socioeconomic History    Marital status: /Civil Union     Spouse name: None    Number of children: None    Years of education: None    Highest education level: None   Occupational History     Comment: Works for an    Tobacco Use    Smoking status: Never Smoker    Smokeless tobacco: Never Used    Tobacco comment: Denied:  History of smoking cigarettes   Vaping Use    Vaping Use: Never used   Substance and Sexual Activity    Alcohol use: No    Drug use: No    Sexual activity: Not Currently   Other Topics Concern    None   Social History Narrative    None     Social Determinants of Health     Financial Resource Strain:     Difficulty of Paying Living Expenses:    Food Insecurity:     Worried About Running Out of Food in the Last Year:     Ran Out of Food in the Last Year:    Transportation Needs:     Lack of Transportation (Medical):      Lack of Transportation (Non-Medical):    Physical Activity: Insufficiently Active    Days of Exercise per Week: 2 days    Minutes of Exercise per Session: 40 min   Stress: Stress Concern Present    Feeling of Stress : Rather much   Social Connections:     Frequency of Communication with Friends and Family:     Frequency of Social Gatherings with Friends and Family:     Attends [employer] Services:     Active Member of Clubs or Organizations:     Attends Club or Organization Meetings:     Marital Status:    Intimate Partner Violence:     Fear of Current or Ex-Partner:     Emotionally Abused:     Physically Abused:     Sexually Abused:       Medications and Allergies:     Current Outpatient Medications   Medication Sig Dispense Refill    amoxicillin (AMOXIL) 500 MG tablet Take 500 mg by mouth 2 (two) times a day      Biotin 1 MG CAPS Take by mouth daily       calcium carbonate (OS-RAYMUNDO) 600 MG tablet Take 600 mg by mouth 2 (two) times a day with meals      cholecalciferol (VITAMIN D3) 1,000 units tablet Take 5,000 Units by mouth daily       glucosamine-chondroitin 500-400 MG tablet Take 1 tablet by mouth daily      LORazepam (ATIVAN) 0 5 mg tablet Take 1 tablet (0 5 mg total) by mouth daily as needed for anxiety 30 tablet 3    magnesium gluconate (MAGONATE) 500 mg tablet Take 500 mg by mouth 2 (two) times a day      MELATONIN ER PO Take 0 5 mg by mouth daily      multivitamin (THERAGRAN) TABS Take 1 tablet by mouth daily      QUERCETIN PO Take by mouth daily      zolpidem (AMBIEN) 10 mg tablet Take 1 tablet (10 mg total) by mouth daily at bedtime as needed for sleep 30 tablet 3     No current facility-administered medications for this visit  No Known Allergies   Immunizations:     Immunization History   Administered Date(s) Administered    Influenza Quadrivalent, 6-35 Months IM 1955    Influenza, recombinant, quadrivalent,injectable, preservative free 11/21/2018, 11/22/2019, 09/30/2020    Pneumococcal Polysaccharide PPV23 1955    SARS-CoV-2 / COVID-19 mRNA IM (Lindsay Vahid) 02/09/2021, 03/09/2021    Tdap [birth year], 05/30/2018, 07/22/2019      Health Maintenance:         Topic Date Due    Hepatitis C Screening  Never done    DXA SCAN  Never done    HIV Screening  Never done    Breast Cancer Screening: Mammogram  08/26/2021    Colorectal Cancer Screening  02/19/2023         Topic Date Due    Pneumococcal Vaccine: 65+ Years (1 of 1 - PPSV23) 08/28/2021    Influenza Vaccine (1) 09/01/2021      Medicare Health Risk Assessment:     Vinnie Gambino is here for her Welcome to Medicare visit  Health Risk Assessment:   Patient rates overall health as very good  Patient feels that their physical health rating is same  Patient is very satisfied with their life  Eyesight was rated as same  Hearing was rated as same  Patient feels that their emotional and mental health rating is same  Patients states they are never, rarely angry  Patient states they are sometimes unusually tired/fatigued  Pain experienced in the last 7 days has been some  Patient's pain rating has been 4/10  Patient states that she has experienced no weight loss or gain in last 6 months  Depression Screening:   PHQ-2 Score: 0      Fall Risk Screening: In the past year, patient has experienced: no history of falling in past year      Urinary Incontinence Screening:   Patient has not leaked urine accidently in the last six months       Home Safety:  Patient does not have trouble with stairs inside or outside of their home  Patient has working smoke alarms and has working carbon monoxide detector  Home safety hazards include: none  Nutrition:   Current diet is Regular  Medications:   Patient is currently taking over-the-counter supplements  OTC medications include: see medication list  Patient is able to manage medications  Activities of Daily Living (ADLs)/Instrumental Activities of Daily Living (IADLs):   Walk and transfer into and out of bed and chair?: Yes  Dress and groom yourself?: Yes    Bathe or shower yourself?: Yes    Feed yourself? Yes  Do your laundry/housekeeping?: Yes  Manage your money, pay your bills and track your expenses?: Yes  Make your own meals?: Yes    Do your own shopping?: Yes    Durable Medical Equipment Suppliers  none    Previous Hospitalizations:   Any hospitalizations or ED visits within the last 12 months?: No      Advance Care Planning:   Living will: Yes    Durable POA for healthcare:  Yes    Advanced directive: Yes    Five wishes given: No      Cognitive Screening:   Provider or family/friend/caregiver concerned regarding cognition?: No    PREVENTIVE SCREENINGS      Cardiovascular Screening:    General: Screening Not Indicated and History Lipid Disorder      Diabetes Screening:     General: Screening Current      Colorectal Cancer Screening:     General: Screening Current      Breast Cancer Screening:     General: Screening Current      Cervical Cancer Screening:    General: Screening Not Indicated      Osteoporosis Screening:    General: Risks and Benefits Discussed    Due for: DXA Axial      Abdominal Aortic Aneurysm (AAA) Screening:        General: Screening Not Indicated      Lung Cancer Screening:     General: Screening Not Indicated      Hepatitis C Screening:    General: Risks and Benefits Discussed    Hep C Screening Accepted: Yes      Screening, Brief Intervention, and Referral to Treatment (SBIRT)    Screening      Single Item Drug Screening:  How often have you used an illegal drug (including marijuana) or a prescription medication for non-medical reasons in the past year? never    Single Item Drug Screen Score: 0  Interpretation: Negative screen for possible drug use disorder    Brief Intervention  Alcohol & drug use screenings were reviewed  No concerns regarding substance use disorder identified  Other Counseling Topics:   Car/seat belt/driving safety, skin self-exam, sunscreen and calcium and vitamin D intake and regular weightbearing exercise  Visual Acuity Screening    Right eye Left eye Both eyes   Without correction:      With correction: 20/25 20/25 20/15       Physical Exam:     /70 (BP Location: Left arm, Patient Position: Sitting, Cuff Size: Standard) Comment: bp  Pulse 84   Temp 99 °F (37 2 °C) (Temporal) Comment: NO NSAIDS  SpO2 97%      Physical Exam  Vitals reviewed  Constitutional:       General: She is not in acute distress  Appearance: She is well-developed  She is not diaphoretic  Neck:      Thyroid: No thyromegaly  Vascular: No JVD  Cardiovascular:      Rate and Rhythm: Normal rate and regular rhythm  Heart sounds: Normal heart sounds  No murmur heard  Pulmonary:      Effort: Pulmonary effort is normal  No respiratory distress  Breath sounds: Normal breath sounds  No wheezing or rales  Abdominal:      General: Bowel sounds are normal  There is no distension  Palpations: Abdomen is soft  Tenderness: There is no abdominal tenderness  Musculoskeletal:      Cervical back: Neck supple  Right lower leg: Edema (venous varicosities) present  Left lower leg: Edema (venous varicosities) present  Lymphadenopathy:      Cervical: No cervical adenopathy  Skin:     General: Skin is warm and dry  Neurological:      Mental Status: She is alert     Psychiatric:         Mood and Affect: Mood normal          Behavior: Behavior normal           Natividad Pretty, DO

## 2021-12-02 ENCOUNTER — TELEPHONE (OUTPATIENT)
Dept: INTERNAL MEDICINE CLINIC | Facility: CLINIC | Age: 66
End: 2021-12-02

## 2021-12-31 ENCOUNTER — NURSE TRIAGE (OUTPATIENT)
Dept: OTHER | Facility: OTHER | Age: 66
End: 2021-12-31

## 2022-01-03 ENCOUNTER — OFFICE VISIT (OUTPATIENT)
Dept: INTERNAL MEDICINE CLINIC | Facility: CLINIC | Age: 67
End: 2022-01-03
Payer: MEDICARE

## 2022-01-03 VITALS — DIASTOLIC BLOOD PRESSURE: 58 MMHG | SYSTOLIC BLOOD PRESSURE: 100 MMHG | HEART RATE: 88 BPM

## 2022-01-03 DIAGNOSIS — Z78.0 ASYMPTOMATIC POSTMENOPAUSAL STATE: ICD-10-CM

## 2022-01-03 DIAGNOSIS — F13.20 BENZODIAZEPINE DEPENDENCE (HCC): ICD-10-CM

## 2022-01-03 DIAGNOSIS — L50.9 HIVES: Primary | ICD-10-CM

## 2022-01-03 DIAGNOSIS — F41.9 ANXIETY: ICD-10-CM

## 2022-01-03 PROCEDURE — 99214 OFFICE O/P EST MOD 30 MIN: CPT | Performed by: INTERNAL MEDICINE

## 2022-01-03 RX ORDER — LORAZEPAM 0.5 MG/1
0.5 TABLET ORAL DAILY PRN
Qty: 30 TABLET | Refills: 3 | Status: SHIPPED | OUTPATIENT
Start: 2022-01-03 | End: 2022-03-24 | Stop reason: SDUPTHER

## 2022-01-03 NOTE — PATIENT INSTRUCTIONS
Urticaria   AMBULATORY CARE:   Urticaria  is also called hives  Hives can change size and shape, and appear anywhere on your skin  They can be mild or severe and last from a few minutes to a few days  Hives may be a sign of a severe allergic reaction called anaphylaxis that needs immediate treatment  Urticaria that lasts longer than 6 weeks may be a chronic condition that needs long-term treatment  Call your local emergency number (911 in the [address] [facility]) for signs or symptoms of anaphylaxis,  such as trouble breathing, swelling in your mouth or throat, or wheezing  You may also have itching, a rash, or feel like you are going to faint  Seek care immediately if:   · Your heart is beating faster than it normally does  · You have cramping or severe pain in your abdomen  Call your doctor if:   · You have a fever  · Your skin still itches 24 hours after you take your medicine  · You still have hives after 7 days  · Your joints are painful and swollen  · You have questions or concerns about your condition or care  Steps to take for signs or symptoms of anaphylaxis:   · Immediately  give 1 shot of epinephrine only into the outer thigh muscle  · Leave the shot in place  as directed  Your healthcare provider may recommend you leave it in place for up to 10 seconds before you remove it  This helps make sure all of the epinephrine is delivered  · Call 911 and go to the emergency department,  even if the shot improved symptoms  Do not drive yourself  Bring the used epinephrine shot with you  Treatment for mild urticaria  may not be needed  Chronic urticaria may need to be treated with more than one medicine, or other medicines than listed below  The following are common medicines used to treat urticaria:  · Antihistamines  decrease mild symptoms such as itching or a rash  · Steroids  decrease redness, pain, and swelling      · Epinephrine  is used to treat severe allergic reactions such as anaphylaxis  Safety precautions to take if you are at risk for anaphylaxis:   · Keep 2 shots of epinephrine with you at all times  You may need a second shot, because epinephrine only works for about 20 minutes and symptoms may return  Your healthcare provider can show you and family members how to give the shot  Check the expiration date every month and replace it before it expires  · Create an action plan  Your healthcare provider can help you create a written plan that explains the allergy and an emergency plan to treat a reaction  The plan explains when to give a second epinephrine shot if symptoms return or do not improve after the first  Give copies of the action plan and emergency instructions to family members, work and school staff, and  providers  Show them how to give a shot of epinephrine  · Be careful when you exercise  If you have had exercise-induced anaphylaxis, do not exercise right after you eat  Stop exercising right away if you start to develop any signs or symptoms of anaphylaxis  You may first feel tired, warm, or have itchy skin  Hives, swelling, and severe breathing problems may develop if you continue to exercise  · Carry medical alert identification  Wear medical alert jewelry or carry a card that explains the allergy  Ask your healthcare provider where to get these items  · Keep a record of triggers and symptoms  Record everything you eat, drink, or apply to your skin for 3 weeks  Include stressful events and what you were doing right before your hives started  Bring the record with you to follow-up visits with your healthcare provider  Manage urticaria:   · Cool your skin  This may help decrease itching  Apply a cool pack to your hives  Dip a hand towel in cool water, wring it out, and place it on your hives  You may also soak your skin in a cool oatmeal bath  · Do not rub your hives  This can irritate your skin and cause more hives      · Wear loose clothing  Tight clothes may irritate your skin and cause more hives  · Manage stress  Stress may trigger hives, or make them worse  Learn new ways to relax, such as deep breathing  Follow up with your doctor as directed:  Write down your questions so you remember to ask them during your visits  © Copyright [employer] 2021 Information is for End User's use only and may not be sold, redistributed or otherwise used for commercial purposes  All illustrations and images included in CareNotes® are the copyrighted property of [employer]  or [facility] Rashaun Glass   The above information is an  only  It is not intended as medical advice for individual conditions or treatments  Talk to your doctor, nurse or pharmacist before following any medical regimen to see if it is safe and effective for you

## 2022-01-04 NOTE — PROGRESS NOTES
[place]    NAME: Thom Feliz  AGE: 73 y o  SEX: female  : 1955     DATE: 1/3/2022     Assessment and Plan:     1  Hives    She was reassured  No frequent episodes of hives and went away with appropriate treatment  No concern for mast cell activation syndrome  2  Asymptomatic postmenopausal state    Recommend screening for osteoporosis  - DXA bone density spine hip and pelvis; Future    3  Anxiety  4  Benzodiazepine dependence ([facility] [address] )    [place]MP [place]  Continue lorazepam as needed  Stable on medication     - LORazepam (ATIVAN) 0 5 mg tablet; Take 1 tablet (0 5 mg total) by mouth daily as needed for anxiety  Dispense: 30 tablet; Refill: 3        Return in about 9 months (around 2022) for Subsequent AWV  History of Present Illness:       Had hives that started 4 days ago  Nothing that she can think that would have triggered it  No other allergy symptoms  No new skin products or detergents  No other chemical exposures  Went away ultimately with benadryl  She was reading online and worrying if something else may be going on such as mast cell activation syndrome  No anaphylactic or low BP symptoms  She is 73 and overdue for bone density testing to evaluate for osteoporosis  Continues to struggle with anxiety and insomnia at times  Also still having idiopathic neuropathy symptoms  Still tingling in her face and also feels like feet are a little worse  Review of Systems:     Review of Systems   Constitutional: Negative  Respiratory: Negative  Cardiovascular: Negative  Gastrointestinal: Negative  Skin: Positive for rash (hives (now resolved))  Neurological: Positive for numbness  Psychiatric/Behavioral: Positive for sleep disturbance  The patient is nervous/anxious  Objective:     /58   Pulse 88     Physical Exam  Constitutional:       General: She is not in acute distress       Appearance: She is not ill-appearing  Cardiovascular:      Rate and Rhythm: Normal rate and regular rhythm  Heart sounds: No murmur heard  Pulmonary:      Effort: Pulmonary effort is normal  No respiratory distress  Breath sounds: No wheezing  Abdominal:      General: Bowel sounds are normal  There is no distension  Tenderness: There is no abdominal tenderness  Musculoskeletal:      Right lower leg: No edema  Left lower leg: No edema  Neurological:      Mental Status: She is alert           Zenia Silva DO  [facility]

## 2022-03-18 ENCOUNTER — RA CDI HCC (OUTPATIENT)
Dept: OTHER | Facility: HOSPITAL | Age: 67
End: 2022-03-18

## 2022-03-18 NOTE — PROGRESS NOTES
[address]  coding opportunities       Chart reviewed, no opportunity found: CHART REVIEWED, NO OPPORTUNITY FOUND        Patients Insurance     Medicare Insurance: Medicare

## 2022-03-24 ENCOUNTER — HOSPITAL ENCOUNTER (OUTPATIENT)
Dept: BONE DENSITY | Facility: CLINIC | Age: 67
Discharge: HOME | End: 2022-03-24
Payer: MEDICARE

## 2022-03-24 ENCOUNTER — APPOINTMENT (OUTPATIENT)
Dept: LAB | Facility: CLINIC | Age: 67
End: 2022-03-24
Payer: MEDICARE

## 2022-03-24 ENCOUNTER — OFFICE VISIT (OUTPATIENT)
Dept: INTERNAL MEDICINE CLINIC | Facility: CLINIC | Age: 67
End: 2022-03-24
Payer: MEDICARE

## 2022-03-24 VITALS — HEART RATE: 80 BPM | DIASTOLIC BLOOD PRESSURE: 86 MMHG | SYSTOLIC BLOOD PRESSURE: 122 MMHG

## 2022-03-24 DIAGNOSIS — Z78.0 ASYMPTOMATIC POSTMENOPAUSAL STATE: ICD-10-CM

## 2022-03-24 DIAGNOSIS — E55.9 VITAMIN D DEFICIENCY: ICD-10-CM

## 2022-03-24 DIAGNOSIS — M35.00 SICCA SYNDROME (HCC): Primary | ICD-10-CM

## 2022-03-24 DIAGNOSIS — M35.00 SICCA SYNDROME (HCC): ICD-10-CM

## 2022-03-24 DIAGNOSIS — E78.2 MIXED HYPERLIPIDEMIA: ICD-10-CM

## 2022-03-24 DIAGNOSIS — F41.9 ANXIETY: ICD-10-CM

## 2022-03-24 DIAGNOSIS — I87.2 CHRONIC VENOUS INSUFFICIENCY: ICD-10-CM

## 2022-03-24 DIAGNOSIS — G47.00 INSOMNIA, UNSPECIFIED TYPE: ICD-10-CM

## 2022-03-24 LAB
25(OH)D3 SERPL-MCNC: 45.1 NG/ML (ref 30–100)
ALBUMIN SERPL BCP-MCNC: 3.9 G/DL (ref 3.5–5)
ALP SERPL-CCNC: 97 U/L (ref 46–116)
ALT SERPL W P-5'-P-CCNC: 22 U/L (ref 12–78)
ANION GAP SERPL CALCULATED.3IONS-SCNC: 5 MMOL/L (ref 4–13)
AST SERPL W P-5'-P-CCNC: 21 U/L (ref 5–45)
BASOPHILS # BLD AUTO: 0.06 THOUSANDS/ΜL (ref 0–0.1)
BASOPHILS NFR BLD AUTO: 1 % (ref 0–1)
BILIRUB SERPL-MCNC: 0.46 MG/DL (ref 0.2–1)
BUN SERPL-MCNC: 19 MG/DL (ref 5–25)
CALCIUM SERPL-MCNC: 9.4 MG/DL (ref 8.3–10.1)
CHLORIDE SERPL-SCNC: 105 MMOL/L (ref 100–108)
CHOLEST SERPL-MCNC: 220 MG/DL
CO2 SERPL-SCNC: 28 MMOL/L (ref 21–32)
CREAT SERPL-MCNC: 0.82 MG/DL (ref 0.6–1.3)
EOSINOPHIL # BLD AUTO: 0.16 THOUSAND/ΜL (ref 0–0.61)
EOSINOPHIL NFR BLD AUTO: 3 % (ref 0–6)
ERYTHROCYTE [DISTWIDTH] IN BLOOD BY AUTOMATED COUNT: 13 % (ref 11.6–15.1)
ERYTHROCYTE [SEDIMENTATION RATE] IN BLOOD: 17 MM/HOUR (ref 0–29)
GFR SERPL CREATININE-BSD FRML MDRD: 75 ML/MIN/1.73SQ M
GLUCOSE P FAST SERPL-MCNC: 92 MG/DL (ref 65–99)
HCT VFR BLD AUTO: 39.8 % (ref 34.8–46.1)
HDLC SERPL-MCNC: 76 MG/DL
HGB BLD-MCNC: 12.6 G/DL (ref 11.5–15.4)
IMM GRANULOCYTES # BLD AUTO: 0.01 THOUSAND/UL (ref 0–0.2)
IMM GRANULOCYTES NFR BLD AUTO: 0 % (ref 0–2)
LDLC SERPL CALC-MCNC: 134 MG/DL (ref 0–100)
LYMPHOCYTES # BLD AUTO: 1.46 THOUSANDS/ΜL (ref 0.6–4.47)
LYMPHOCYTES NFR BLD AUTO: 30 % (ref 14–44)
MCH RBC QN AUTO: 29.6 PG (ref 26.8–34.3)
MCHC RBC AUTO-ENTMCNC: 31.7 G/DL (ref 31.4–37.4)
MCV RBC AUTO: 93 FL (ref 82–98)
MONOCYTES # BLD AUTO: 0.38 THOUSAND/ΜL (ref 0.17–1.22)
MONOCYTES NFR BLD AUTO: 8 % (ref 4–12)
NEUTROPHILS # BLD AUTO: 2.86 THOUSANDS/ΜL (ref 1.85–7.62)
NEUTS SEG NFR BLD AUTO: 58 % (ref 43–75)
NRBC BLD AUTO-RTO: 0 /100 WBCS
PLATELET # BLD AUTO: 274 THOUSANDS/UL (ref 149–390)
PMV BLD AUTO: 9.6 FL (ref 8.9–12.7)
POTASSIUM SERPL-SCNC: 4.2 MMOL/L (ref 3.5–5.3)
PROT SERPL-MCNC: 7.4 G/DL (ref 6.4–8.2)
RBC # BLD AUTO: 4.26 MILLION/UL (ref 3.81–5.12)
SODIUM SERPL-SCNC: 138 MMOL/L (ref 136–145)
TRIGL SERPL-MCNC: 50 MG/DL
TSH SERPL DL<=0.05 MIU/L-ACNC: 2.86 UIU/ML (ref 0.36–3.74)
WBC # BLD AUTO: 4.93 THOUSAND/UL (ref 4.31–10.16)

## 2022-03-24 PROCEDURE — 85652 RBC SED RATE AUTOMATED: CPT

## 2022-03-24 PROCEDURE — 80053 COMPREHEN METABOLIC PANEL: CPT

## 2022-03-24 PROCEDURE — 86235 NUCLEAR ANTIGEN ANTIBODY: CPT

## 2022-03-24 PROCEDURE — 82306 VITAMIN D 25 HYDROXY: CPT

## 2022-03-24 PROCEDURE — 84443 ASSAY THYROID STIM HORMONE: CPT

## 2022-03-24 PROCEDURE — 99214 OFFICE O/P EST MOD 30 MIN: CPT | Performed by: INTERNAL MEDICINE

## 2022-03-24 PROCEDURE — 36415 COLL VENOUS BLD VENIPUNCTURE: CPT

## 2022-03-24 PROCEDURE — 77080 DXA BONE DENSITY AXIAL: CPT

## 2022-03-24 PROCEDURE — 85025 COMPLETE CBC W/AUTO DIFF WBC: CPT

## 2022-03-24 PROCEDURE — 80061 LIPID PANEL: CPT

## 2022-03-24 RX ORDER — ZOLPIDEM TARTRATE 10 MG/1
10 TABLET ORAL
Qty: 30 TABLET | Refills: 3 | Status: SHIPPED | OUTPATIENT
Start: 2022-03-24

## 2022-03-24 RX ORDER — LOTEPREDNOL ETABONATE 5 MG/ML
1 SUSPENSION/ DROPS OPHTHALMIC DAILY
COMMUNITY
Start: 2022-03-02

## 2022-03-24 RX ORDER — LORAZEPAM 0.5 MG/1
0.5 TABLET ORAL DAILY PRN
Qty: 30 TABLET | Refills: 3 | Status: SHIPPED | OUTPATIENT
Start: 2022-03-24

## 2022-03-24 NOTE — PATIENT INSTRUCTIONS
1  Get further lab testing  2  Get bone density testing  3   Get lower extremity ultrasound of your legs

## 2022-03-24 NOTE — PROGRESS NOTES
[place]    NAME: Homero Jasso  AGE: 73 y o  SEX: female  : 1955     DATE: 3/24/2022     Assessment and Plan:     1  Sicca syndrome (HCC)    Evaluate for sjogren's  Follow-up with eye specialist for dry eyes  - Sjogren's Antibodies; Future  - Centromere Antibody; Future  - TSH, 3rd generation with Free T4 reflex; Future  - Lipid Panel with Direct LDL reflex; Future  - Comprehensive metabolic panel; Future  - CBC and differential; Future  - Sedimentation rate, automated; Future    2  Asymptomatic postmenopausal state    Check DXA to check for osteoporosis  - DXA bone density spine hip and pelvis; Future    3  Mixed hyperlipidemia    Check lipids     - TSH, 3rd generation with Free T4 reflex; Future  - Lipid Panel with Direct LDL reflex; Future  - Comprehensive metabolic panel; Future  - CBC and differential; Future    4  Chronic venous insufficiency    Check vascular ultrasound  No concerns noted on exam today  Watch diet and exercise  - VAS reflux lower limb venous duplex study with reflux assessment, complete bilateral; Future    5  Vitamin D deficiency  - Vitamin D 25 hydroxy; Future    6  Anxiety    [place]  Uses sparingly     - LORazepam (ATIVAN) 0 5 mg tablet; Take 1 tablet (0 5 mg total) by mouth daily as needed for anxiety  Dispense: 30 tablet; Refill: 3    7  Insomnia    [place]  Uses sparingly  - zolpidem (AMBIEN) 10 mg tablet; Take 1 tablet (10 mg total) by mouth daily at bedtime as needed for sleep  Dispense: 30 tablet; Refill: 3    BMI Counseling: BMI was not able to be calculated due to patient refusing height and/or weight  Return in about 6 months (around 2022) for Subsequent AWV  Chief Complaint:     Chief Complaint   Patient presents with    Follow-up      History of Present Illness:       Patient has been feeling good for the most part  Still has bouts of anxiety/insomnia  Doesn't use lorazepam/zolpidem every day  Only uses it when she really needs it  Has been having issues with dry eyes and dry mouth  Saw her ophthalmologist and she is scheduled to see a specialist  Was told to check for Sjogren's  Has had negative antibodies to SS-A and SS-B in the past       Review of Systems:     Review of Systems   Constitutional: Negative for chills, fatigue and fever  HENT:        Dry mouth   Eyes:        Dry eyes   Respiratory: Negative  Cardiovascular: Negative  Gastrointestinal: Negative  Neurological: Positive for numbness (face sometimes)  Objective:     /86   Pulse 80     Physical Exam  Constitutional:       General: She is not in acute distress  Appearance: She is not ill-appearing  Cardiovascular:      Rate and Rhythm: Normal rate and regular rhythm  Heart sounds: No murmur heard  Pulmonary:      Effort: Pulmonary effort is normal  No respiratory distress  Breath sounds: No wheezing  Abdominal:      General: Bowel sounds are normal  There is no distension  Tenderness: There is no abdominal tenderness  Musculoskeletal:      Right lower leg: No edema  Left lower leg: No edema  Neurological:      Mental Status: She is alert         Cristina Tinoco DO  [facility]

## 2022-03-25 LAB
CENTROMERE B AB SER-ACNC: <0.2 AI (ref 0–0.9)
ENA SS-A AB SER-ACNC: <0.2 AI (ref 0–0.9)
ENA SS-B AB SER-ACNC: <0.2 AI (ref 0–0.9)

## 2022-05-09 ENCOUNTER — OFFICE VISIT (OUTPATIENT)
Dept: INTERNAL MEDICINE CLINIC | Facility: CLINIC | Age: 67
End: 2022-05-09
Payer: MEDICARE

## 2022-05-09 VITALS
DIASTOLIC BLOOD PRESSURE: 70 MMHG | SYSTOLIC BLOOD PRESSURE: 118 MMHG | BODY MASS INDEX: 25.06 KG/M2 | HEART RATE: 84 BPM | TEMPERATURE: 97.6 F | RESPIRATION RATE: 20 BRPM | OXYGEN SATURATION: 97 % | HEIGHT: 67 IN

## 2022-05-09 DIAGNOSIS — J01.80 ACUTE NON-RECURRENT SINUSITIS OF OTHER SINUS: Primary | ICD-10-CM

## 2022-05-09 DIAGNOSIS — H93.8X2 EAR PRESSURE, LEFT: ICD-10-CM

## 2022-05-09 PROBLEM — E66.3 OVERWEIGHT: Status: RESOLVED | Noted: 2020-11-20 | Resolved: 2022-05-09

## 2022-05-09 PROCEDURE — 99214 OFFICE O/P EST MOD 30 MIN: CPT | Performed by: INTERNAL MEDICINE

## 2022-05-09 RX ORDER — PREDNISONE 20 MG/1
40 TABLET ORAL DAILY
Qty: 10 TABLET | Refills: 0 | Status: SHIPPED | OUTPATIENT
Start: 2022-05-09 | End: 2022-05-14

## 2022-05-09 RX ORDER — AMOXICILLIN AND CLAVULANATE POTASSIUM 875; 125 MG/1; MG/1
1 TABLET, FILM COATED ORAL EVERY 12 HOURS SCHEDULED
Qty: 14 TABLET | Refills: 0 | Status: SHIPPED | OUTPATIENT
Start: 2022-05-09 | End: 2022-05-16

## 2022-05-09 NOTE — PROGRESS NOTES
Preet[place]rtle    NAME: Elizabeth Black  AGE: 73 y o  SEX: female  : 1955     DATE: 2022     Assessment and Plan:     1  Acute non-recurrent sinusitis of other sinus  2  Ear pressure, left    Recommend course of antibiotics and steroids  Ibuprofen/aleve prn  Warm compresses prn  Nasal spray prn  - predniSONE 20 mg tablet; Take 2 tablets (40 mg total) by mouth daily for 5 days  Dispense: 10 tablet; Refill: 0  - amoxicillin-clavulanate (AUGMENTIN) 875-125 mg per tablet; Take 1 tablet by mouth every 12 (twelve) hours for 7 days  Dispense: 14 tablet; Refill: 0     History of Present Illness:     Recently in [place] and flying back  When she landed, started to notice more issues with sinus congestion, pressure, drainage as well as left ear pressure  No fever or chills  Negative rapid covid test      Review of Systems:     Review of Systems   Constitutional: Negative  HENT: Positive for congestion, ear pain, rhinorrhea, sinus pressure and sinus pain  Respiratory: Negative  Cardiovascular: Negative  Objective:     /70 (BP Location: Left arm, Patient Position: Sitting, Cuff Size: Standard)   Pulse 84   Temp 97 6 °F (36 4 °C) (Tympanic)   Resp 20   Ht 5' 7" (1 702 m)   SpO2 97%   BMI 25 06 kg/m²     Physical Exam  Constitutional:       General: She is not in acute distress  Appearance: She is not ill-appearing  HENT:      Right Ear: Tympanic membrane, ear canal and external ear normal  There is no impacted cerumen  Left Ear: Tympanic membrane, ear canal and external ear normal  There is no impacted cerumen  Ears:      Comments: Mild fluid behind TMs     Nose: Congestion and rhinorrhea present  Mouth/Throat:      Mouth: Mucous membranes are moist       Pharynx: No oropharyngeal exudate or posterior oropharyngeal erythema  Eyes:      General: No scleral icterus  Right eye: No discharge           Left eye: No discharge  Cardiovascular:      Rate and Rhythm: Normal rate and regular rhythm  Heart sounds: No murmur heard  Pulmonary:      Effort: Pulmonary effort is normal  No respiratory distress  Breath sounds: No wheezing  Neurological:      Mental Status: She is alert           Chad Grey DO  [facility]

## 2022-06-16 ENCOUNTER — TELEPHONE (OUTPATIENT)
Dept: INTERNAL MEDICINE CLINIC | Facility: CLINIC | Age: 67
End: 2022-06-16

## 2022-06-16 DIAGNOSIS — M35.00 SICCA SYNDROME (HCC): Primary | ICD-10-CM

## 2022-06-16 NOTE — TELEPHONE ENCOUNTER
Call Talha she would like a test for sjrogens disease  Her dentist told her to do this  [facility] test #99691 code cpt 83520 x 9    Call Talha for more info   [phone]

## 2022-07-07 ENCOUNTER — TELEPHONE (OUTPATIENT)
Dept: INTERNAL MEDICINE CLINIC | Facility: CLINIC | Age: 67
End: 2022-07-07

## 2022-07-07 NOTE — TELEPHONE ENCOUNTER
Has been waiting for results for sjrogens disease lab test from 6/16/22  [facility] told pt the results were faxed to Dr Nella Gaucher  Nothing in media and Linda Arias has not seen it  Please Advise: [phone] [address] [facility]   Phone # [phone] (opens at  [address]  m  tomorrow)

## 2022-07-12 ENCOUNTER — TELEPHONE (OUTPATIENT)
Dept: INTERNAL MEDICINE CLINIC | Facility: CLINIC | Age: 67
End: 2022-07-12

## 2022-07-12 NOTE — TELEPHONE ENCOUNTER
----- Message from Fay Scheuermann, DO sent at 7/12/2022  9:17 AM EDT -----  Does have some evidence of early antibodies to sjogren's disease which would cause dry eyes/dry mouth  Xylimelts can help with dry mouth  Stay well hydrated

## 2022-08-08 ENCOUNTER — APPOINTMENT (OUTPATIENT)
Dept: LAB | Facility: CLINIC | Age: 67
End: 2022-08-08
Payer: MEDICARE

## 2022-08-08 DIAGNOSIS — M35.9 UNDIFFERENTIATED CONNECTIVE TISSUE DISEASE (HCC): ICD-10-CM

## 2022-08-08 PROCEDURE — 36415 COLL VENOUS BLD VENIPUNCTURE: CPT

## 2022-08-18 ENCOUNTER — APPOINTMENT (OUTPATIENT)
Dept: LAB | Facility: CLINIC | Age: 67
End: 2022-08-18
Payer: MEDICARE

## 2022-08-18 DIAGNOSIS — G93.32 CHRONIC FATIGUE SYNDROME: ICD-10-CM

## 2022-08-18 DIAGNOSIS — D68.61 ANTI-PHOSPHOLIPID ANTIBODY SYNDROME (HCC): ICD-10-CM

## 2022-08-18 DIAGNOSIS — M35.9 UNDIFFERENTIATED CONNECTIVE TISSUE DISEASE (HCC): ICD-10-CM

## 2022-08-18 DIAGNOSIS — D64.9 ANEMIA, UNSPECIFIED TYPE: ICD-10-CM

## 2022-08-18 LAB
BASOPHILS # BLD AUTO: 0.04 THOUSANDS/ΜL (ref 0–0.1)
BASOPHILS NFR BLD AUTO: 1 % (ref 0–1)
EOSINOPHIL # BLD AUTO: 0.13 THOUSAND/ΜL (ref 0–0.61)
EOSINOPHIL NFR BLD AUTO: 3 % (ref 0–6)
ERYTHROCYTE [DISTWIDTH] IN BLOOD BY AUTOMATED COUNT: 13.1 % (ref 11.6–15.1)
FERRITIN SERPL-MCNC: 30 NG/ML (ref 8–388)
HCT VFR BLD AUTO: 39 % (ref 34.8–46.1)
HGB BLD-MCNC: 12.3 G/DL (ref 11.5–15.4)
IMM GRANULOCYTES # BLD AUTO: 0.02 THOUSAND/UL (ref 0–0.2)
IMM GRANULOCYTES NFR BLD AUTO: 0 % (ref 0–2)
IRON SATN MFR SERPL: 21 % (ref 15–50)
IRON SERPL-MCNC: 59 UG/DL (ref 50–170)
LYMPHOCYTES # BLD AUTO: 1.37 THOUSANDS/ΜL (ref 0.6–4.47)
LYMPHOCYTES NFR BLD AUTO: 30 % (ref 14–44)
MCH RBC QN AUTO: 29.4 PG (ref 26.8–34.3)
MCHC RBC AUTO-ENTMCNC: 31.5 G/DL (ref 31.4–37.4)
MCV RBC AUTO: 93 FL (ref 82–98)
MONOCYTES # BLD AUTO: 0.37 THOUSAND/ΜL (ref 0.17–1.22)
MONOCYTES NFR BLD AUTO: 8 % (ref 4–12)
NEUTROPHILS # BLD AUTO: 2.65 THOUSANDS/ΜL (ref 1.85–7.62)
NEUTS SEG NFR BLD AUTO: 58 % (ref 43–75)
NRBC BLD AUTO-RTO: 0 /100 WBCS
PLATELET # BLD AUTO: 287 THOUSANDS/UL (ref 149–390)
PMV BLD AUTO: 9.9 FL (ref 8.9–12.7)
RBC # BLD AUTO: 4.18 MILLION/UL (ref 3.81–5.12)
TIBC SERPL-MCNC: 277 UG/DL (ref 250–450)
WBC # BLD AUTO: 4.58 THOUSAND/UL (ref 4.31–10.16)

## 2022-08-18 PROCEDURE — 82728 ASSAY OF FERRITIN: CPT

## 2022-08-18 PROCEDURE — 83540 ASSAY OF IRON: CPT

## 2022-08-18 PROCEDURE — 85670 THROMBIN TIME PLASMA: CPT

## 2022-08-18 PROCEDURE — 83550 IRON BINDING TEST: CPT

## 2022-08-18 PROCEDURE — 36415 COLL VENOUS BLD VENIPUNCTURE: CPT

## 2022-08-18 PROCEDURE — 85705 THROMBOPLASTIN INHIBITION: CPT

## 2022-08-18 PROCEDURE — 85613 RUSSELL VIPER VENOM DILUTED: CPT

## 2022-08-18 PROCEDURE — 85025 COMPLETE CBC W/AUTO DIFF WBC: CPT

## 2022-08-18 PROCEDURE — 85732 THROMBOPLASTIN TIME PARTIAL: CPT

## 2022-08-23 ENCOUNTER — APPOINTMENT (OUTPATIENT)
Dept: LAB | Facility: CLINIC | Age: 67
End: 2022-08-23
Payer: MEDICARE

## 2022-08-23 DIAGNOSIS — G93.32 CHRONIC FATIGUE SYNDROME: ICD-10-CM

## 2022-08-23 PROCEDURE — 36415 COLL VENOUS BLD VENIPUNCTURE: CPT

## 2022-08-26 LAB
APTT SCREEN TO CONFIRM RATIO: 1.14 RATIO (ref 0–1.34)
CONFIRM APTT/NORMAL: 44.4 SEC (ref 0–47.6)
LA PPP-IMP: NORMAL
SCREEN APTT: 47.5 SEC (ref 0–51.9)
SCREEN DRVVT: 43.7 SEC (ref 0–47)
THROMBIN TIME: 22.5 SEC (ref 0–23)

## 2022-09-15 ENCOUNTER — HOSPITAL ENCOUNTER (OUTPATIENT)
Dept: NON INVASIVE DIAGNOSTICS | Facility: CLINIC | Age: 67
Discharge: HOME | End: 2022-09-15
Payer: MEDICARE

## 2022-09-15 ENCOUNTER — TELEPHONE (OUTPATIENT)
Dept: INTERNAL MEDICINE CLINIC | Facility: CLINIC | Age: 67
End: 2022-09-15

## 2022-09-15 DIAGNOSIS — I87.2 CHRONIC VENOUS INSUFFICIENCY: ICD-10-CM

## 2022-09-15 PROCEDURE — 93970 EXTREMITY STUDY: CPT

## 2022-09-15 PROCEDURE — 93970 EXTREMITY STUDY: CPT | Performed by: SURGERY

## 2022-09-15 NOTE — TELEPHONE ENCOUNTER
----- Message from Radha Ace DO sent at 9/15/2022  4:32 PM EDT -----  Chronic venous insufficiency/incompetence confirmed on ultrasound   Leg elevation and compression stockings are the recommended treatment

## 2022-09-21 ENCOUNTER — RA CDI HCC (OUTPATIENT)
Dept: OTHER | Facility: HOSPITAL | Age: 67
End: 2022-09-21

## 2022-10-04 RX ORDER — CYCLOSPORINE 0 G/ML
SOLUTION/ DROPS OPHTHALMIC; TOPICAL
COMMUNITY
Start: 2022-08-29

## 2022-10-07 ENCOUNTER — OFFICE VISIT (OUTPATIENT)
Dept: INTERNAL MEDICINE CLINIC | Facility: CLINIC | Age: 67
End: 2022-10-07
Payer: MEDICARE

## 2022-10-07 ENCOUNTER — APPOINTMENT (OUTPATIENT)
Dept: RADIOLOGY | Facility: CLINIC | Age: 67
End: 2022-10-07
Payer: MEDICARE

## 2022-10-07 VITALS
BODY MASS INDEX: 31.25 KG/M2 | HEIGHT: 65 IN | RESPIRATION RATE: 20 BRPM | TEMPERATURE: 98.2 F | OXYGEN SATURATION: 96 % | HEART RATE: 84 BPM | SYSTOLIC BLOOD PRESSURE: 128 MMHG | DIASTOLIC BLOOD PRESSURE: 78 MMHG

## 2022-10-07 DIAGNOSIS — M54.50 CHRONIC LOW BACK PAIN WITHOUT SCIATICA, UNSPECIFIED BACK PAIN LATERALITY: ICD-10-CM

## 2022-10-07 DIAGNOSIS — G89.29 CHRONIC LOW BACK PAIN WITHOUT SCIATICA, UNSPECIFIED BACK PAIN LATERALITY: ICD-10-CM

## 2022-10-07 DIAGNOSIS — I87.2 VENOUS INSUFFICIENCY: ICD-10-CM

## 2022-10-07 DIAGNOSIS — M25.551 HIP PAIN, BILATERAL: ICD-10-CM

## 2022-10-07 DIAGNOSIS — M25.552 HIP PAIN, BILATERAL: ICD-10-CM

## 2022-10-07 DIAGNOSIS — E04.1 THYROID NODULE: ICD-10-CM

## 2022-10-07 DIAGNOSIS — E55.9 VITAMIN D DEFICIENCY: ICD-10-CM

## 2022-10-07 DIAGNOSIS — R42 DIZZINESS: ICD-10-CM

## 2022-10-07 DIAGNOSIS — E78.2 MIXED HYPERLIPIDEMIA: ICD-10-CM

## 2022-10-07 DIAGNOSIS — Z91.81 AT RISK FOR FALLS: Primary | ICD-10-CM

## 2022-10-07 DIAGNOSIS — R29.810 FACIAL WEAKNESS: ICD-10-CM

## 2022-10-07 PROCEDURE — 73522 X-RAY EXAM HIPS BI 3-4 VIEWS: CPT

## 2022-10-07 PROCEDURE — 99214 OFFICE O/P EST MOD 30 MIN: CPT

## 2022-10-07 PROCEDURE — 72110 X-RAY EXAM L-2 SPINE 4/>VWS: CPT

## 2022-10-07 PROCEDURE — G0439 PPPS, SUBSEQ VISIT: HCPCS

## 2022-10-07 NOTE — PROGRESS NOTES
Assessment and Plan:     Problem List Items Addressed This Visit        Endocrine    Thyroid nodule    Relevant Orders    TSH, 3rd generation with Free T4 reflex       Other    Mixed hyperlipidemia    Relevant Orders    Lipid panel      Other Visit Diagnoses     At risk for falls    -  Primary    Recommend patient try vestibular therapy  Referral to a chiropractor who does vestibular therapy as well as PT for evaluation    Relevant Orders    Ambulatory Referral to Physical Therapy    Venous insufficiency        Relevant Orders    Ambulatory Referral to Vascular Surgery    Dizziness        Relevant Orders    VAS carotid complete study    CBC and differential    Comprehensive metabolic panel    Ambulatory Referral to Physical Therapy    Facial weakness         Reports facial numbness, dizziness and headaches  Relevant Orders    VAS carotid complete study    Vitamin D deficiency        Relevant Orders    Vitamin D 25 hydroxy    Chronic low back pain without sciatica, unspecified back pain laterality        Relevant Orders    XR spine lumbar minimum 4 views non injury    Hip pain, bilateral        Relevant Orders    XR hips bilateral 3-4 vw w pelvis if performed           Preventive health issues were discussed with patient, and age appropriate screening tests were ordered as noted in patient's After Visit Summary  Personalized health advice and appropriate referrals for health education or preventive services given if needed, as noted in patient's After Visit Summary  History of Present Illness:     Patient presents for a Medicare Wellness Visit    Brandonus Cloudjess presents to the office today for her Medicare wellness visit and follow-up  She offers no new complaints or concerns  She reports trips and falls without injury  She states she has Sjogren's and believes it is related to this  Patient does report some dizziness which is chronic as well as facial numbness    Patient also reports chronic lower back and hip pain   She states that she was ordered x-rays a couple years ago but did not get them at that time  Patient Care Team:  Eliza Ortiz as PCP - General (Nurse Practitioner)     Review of Systems:     Review of Systems   Constitutional: Negative  HENT: Negative  Dry mouth   Respiratory: Negative  Cardiovascular: Negative  Genitourinary: Negative  Musculoskeletal: Positive for arthralgias (Bilateral hip chronic) and back pain (Lower back chronic)  Skin: Negative  Neurological: Positive for dizziness (Chronic) and numbness (Face which is chronic)  Psychiatric/Behavioral: Negative  Problem List:     Patient Active Problem List   Diagnosis    Mixed hyperlipidemia    Thyroid nodule    Chronic fatigue    Solar degeneration    Pain in joint, lower leg    Plantar fasciitis    Peripheral neuropathy    Lower extremity tendinopathy    Generalized anxiety disorder    Degenerative joint disease of left ankle and foot    Facial numbness    Sjogren's syndrome [facility])      Past Medical and Surgical History:     Past Medical History:   Diagnosis Date    [facility] 02[facility]2014    Hyperlipidemia     Situational anxiety     Situational insomnia     Sjogren's syndrome ([facility] )      History reviewed  No pertinent surgical history     Family History:     Family History   Problem Relation Age of Onset    Hypertension Mother     Edema Mother         lymphedema    Celiac disease Father     Other Father         Pacemaker placement    Heart disease Father     Hypertension Father     Prostate cancer Father     Dementia Father     Gout Father     Heart failure Father     Celiac disease Daughter     Allergies Daughter     Celiac disease Child     Other Family         Congenital heart defect      Social History:     Social History     Socioeconomic History    Marital status: /Civil Union     Spouse name: None    Number of children: None    Years of education: None    Highest education level: None   Occupational History     Comment: Works for an    Tobacco Use    Smoking status: Never Smoker    Smokeless tobacco: Never Used    Tobacco comment: Denied:  History of smoking cigarettes   Vaping Use    Vaping Use: Never used   Substance and Sexual Activity    Alcohol use: No    Drug use: No    Sexual activity: Not Currently   Other Topics Concern    None   Social History Narrative    None     Social Determinants of Health     Financial Resource Strain: Low Risk     Difficulty of Paying Living Expenses: Not hard at all   Food Insecurity: Not on file   Transportation Needs: No Transportation Needs    Lack of Transportation (Medical): No    Lack of Transportation (Non-Medical):  No   Physical Activity: Not on file   Stress: Not on file   Social Connections: Not on file   Intimate Partner Violence: Not on file   Housing Stability: Not on file      Medications and Allergies:     Current Outpatient Medications   Medication Sig Dispense Refill    Ascorbic Acid (vitamin C) 1000 MG tablet Take 1,000 mg by mouth daily      Docosahexaenoic Acid (DHA PO) Take 500 mg by mouth daily      famotidine (PEPCID) 40 MG tablet Take 1 tablet (40 mg total) by mouth daily 30 tablet 3    GLUCOSAMINE-CHONDROITIN-MSM PO Take 300 mg by mouth 3 (three) times a day      MAGNESIUM GLUCONATE PO Take 120 mg by mouth daily      Multiple Vitamins-Minerals (HAIR SKIN NAILS PO) Take 2 capsules by mouth daily      QUERCETIN PO Take 500 mg by mouth daily      SODIUM FLUORIDE, DENTAL GEL, 1 1 % CREA Use on teeth nightly 112 g 3    TURMERIC CURCUMIN PO Take 1 g by mouth daily      zolpidem (AMBIEN) 10 mg tablet Take 1 tablet (10 mg total) by mouth daily at bedtime as needed for sleep 30 tablet 3    Cequa 0 09 % SOLN PLEASE SEE ATTACHED FOR DETAILED DIRECTIONS      Cholecalciferol (Vitamin D3) 125 MCG (5000 UT) TABS Take 5,000 Units by mouth daily  (Patient not taking: No sig reported)      glucosamine-chondroitin 500-400 MG tablet Take 1 tablet by mouth daily (Patient not taking: No sig reported)      LORazepam (ATIVAN) 0 5 mg tablet Take 1 tablet (0 5 mg total) by mouth daily as needed for anxiety (Patient not taking: No sig reported) 30 tablet 3    loteprednol etabonate (LOTEMAX) 0 5 % ophthalmic suspension Administer 1 drop to both eyes in the morning (Patient not taking: No sig reported)      MANGANESE PO Take 2 mg by mouth (Patient not taking: No sig reported)      MELATONIN ER PO Take 0 5 mg by mouth daily (Patient not taking: No sig reported)      multivitamin (THERAGRAN) TABS Take 1 tablet by mouth daily (Patient not taking: No sig reported)      NIACIN PO Take 20 mg by mouth (Patient not taking: No sig reported)      Zinc 30 MG TABS Take 30 mg by mouth (Patient not taking: No sig reported)       No current facility-administered medications for this visit  No Known Allergies   Immunizations:     Immunization History   Administered Date(s) Administered    COVID-19 MODERNA VACC 0 5 ML IM 02/09/2021, 03/09/2021, 11/04/2021    INFLUENZA 11/12/2021    Influenza Quadrivalent, 6-35 Months IM 1955    Influenza, recombinant, quadrivalent,injectable, preservative free 11/21/2018, 11/22/2019, 09/30/2020    Pneumococcal Polysaccharide PPV23 1955    Tdap [birth year], 05/30/2018, 07/22/2019      Health Maintenance:         Topic Date Due    Breast Cancer Screening: Mammogram  12/01/2022    Colorectal Cancer Screening  02/19/2023    Hepatitis C Screening  Completed         Topic Date Due    Pneumococcal Vaccine: 65+ Years (1 - PCV) 08/28/2021    COVID-19 Vaccine (4 - Booster for Balinda Cera series) 03/04/2022      Medicare Screening Tests and Risk Assessments:     Marbin Parkinson is here for her Welcome to Medicare and Subsequent Wellness visit  Last Medicare Wellness visit information reviewed, patient interviewed, no change since last AWV   Last Medicare Wellness visit information reviewed, patient interviewed and updates made to the record today  Health Risk Assessment:   Patient rates overall health as very good  Patient feels that their physical health rating is same  Patient is very satisfied with their life  Eyesight was rated as same  Hearing was rated as same  Patient feels that their emotional and mental health rating is same  Patients states they are never, rarely angry  Patient states they are sometimes unusually tired/fatigued  Pain experienced in the last 7 days has been some  Patient's pain rating has been 4/10  Patient states that she has experienced no weight loss or gain in last 6 months  Fall Risk Screening: In the past year, patient has experienced: history of falling in past year    Number of falls: 1  Injured during fall?: No    Feels unsteady when standing or walking?: No    Worried about falling?: No      Urinary Incontinence Screening:   Patient has not leaked urine accidently in the last six months  Home Safety:  Patient does not have trouble with stairs inside or outside of their home  Patient has working smoke alarms and has working carbon monoxide detector  Home safety hazards include: none  Nutrition:   Current diet is Regular  Medications:   Patient is currently taking over-the-counter supplements  OTC medications include: see medication list  Patient is able to manage medications  Activities of Daily Living (ADLs)/Instrumental Activities of Daily Living (IADLs):   Walk and transfer into and out of bed and chair?: Yes  Dress and groom yourself?: Yes    Bathe or shower yourself?: Yes    Feed yourself?  Yes  Do your laundry/housekeeping?: Yes  Manage your money, pay your bills and track your expenses?: Yes  Make your own meals?: Yes    Do your own shopping?: Yes    Durable Medical Equipment Suppliers  none    Previous Hospitalizations:   Any hospitalizations or ED visits within the last 12 months?: No Advance Care Planning:   Living will: Yes    Durable POA for healthcare: Yes    Advanced directive: Yes    Five wishes given: No      Cognitive Screening:   Provider or family/friend/caregiver concerned regarding cognition?: No    PREVENTIVE SCREENINGS      Cardiovascular Screening:    General: Screening Not Indicated and History Lipid Disorder      Diabetes Screening:     General: Screening Current      Colorectal Cancer Screening:     General: Screening Current      Breast Cancer Screening:     General: Screening Current      Cervical Cancer Screening:    General: Screening Not Indicated      Osteoporosis Screening:    General: Risks and Benefits Discussed    Due for: DXA Axial      Abdominal Aortic Aneurysm (AAA) Screening:        General: Screening Not Indicated      Lung Cancer Screening:     General: Screening Not Indicated      Hepatitis C Screening:    General: Screening Current    Hep C Screening Accepted: Yes      Screening, Brief Intervention, and Referral to Treatment (SBIRT)    Screening  Typical number of drinks in a day: 0  Typical number of drinks in a week: 0  Interpretation: Low risk drinking behavior  Single Item Drug Screening:  How often have you used an illegal drug (including marijuana) or a prescription medication for non-medical reasons in the past year? never    Single Item Drug Screen Score: 0  Interpretation: Negative screen for possible drug use disorder    Brief Intervention  Alcohol & drug use screenings were reviewed  No concerns regarding substance use disorder identified  Other Counseling Topics:   Car/seat belt/driving safety, skin self-exam, sunscreen and calcium and vitamin D intake and regular weightbearing exercise       No exam data present     Physical Exam:     /78 (BP Location: Left arm, Patient Position: Sitting, Cuff Size: Large)   Pulse 84   Temp 98 2 °F (36 8 °C) (Tympanic)   Resp 20   Ht 5' 5" (1 651 m)   SpO2 96%   BMI 31 25 kg/m²     Physical Exam  Vitals and nursing note reviewed  Constitutional:       General: She is not in acute distress  Appearance: Normal appearance  She is well-developed  HENT:      Head: Normocephalic and atraumatic  Right Ear: Tympanic membrane normal       Left Ear: Tympanic membrane normal       Nose: Nose normal  No congestion or rhinorrhea  Mouth/Throat:      Mouth: Mucous membranes are moist       Pharynx: Oropharynx is clear  Eyes:      Extraocular Movements: Extraocular movements intact  Conjunctiva/sclera: Conjunctivae normal       Pupils: Pupils are equal, round, and reactive to light  Neck:      Thyroid: No thyromegaly  Vascular: No carotid bruit  Cardiovascular:      Rate and Rhythm: Normal rate and regular rhythm  Pulses: Normal pulses  Heart sounds: Normal heart sounds  No murmur heard  Pulmonary:      Effort: Pulmonary effort is normal  No respiratory distress  Breath sounds: Normal breath sounds  Abdominal:      General: Bowel sounds are normal       Palpations: Abdomen is soft  Tenderness: There is no abdominal tenderness  Musculoskeletal:         General: Normal range of motion  Cervical back: Normal range of motion and neck supple  Lymphadenopathy:      Cervical: No cervical adenopathy  Skin:     General: Skin is warm and dry  Capillary Refill: Capillary refill takes less than 2 seconds  Neurological:      General: No focal deficit present  Mental Status: She is alert and oriented to person, place, and time  Psychiatric:         Mood and Affect: Mood normal          Behavior: Behavior normal          Thought Content:  Thought content normal          Judgment: Judgment normal           DIANE Perez

## 2022-10-07 NOTE — ASSESSMENT & PLAN NOTE
Follows with Rheumatology  Also states she sees functional medicine    Following with ophthalmology and dentist

## 2022-10-07 NOTE — PATIENT INSTRUCTIONS
[facility]  [phone]  [address] [place] [address]     Vestibular rehabilitation (VR) is a specialized form of therapy intended to alleviate both the primary and secondary problems due to vestibular disorders  It is an exercise-based program primarily designed to reduce vertigo and dizziness, reduce gaze instability, and/or reduce imbalance and fall risk

## 2022-10-13 ENCOUNTER — TELEPHONE (OUTPATIENT)
Dept: INTERNAL MEDICINE CLINIC | Facility: CLINIC | Age: 67
End: 2022-10-13

## 2022-10-13 NOTE — TELEPHONE ENCOUNTER
----- Message from Valeriy Callahan, [address]  sent at 10/13/2022 11:41 AM EDT -----  Please call the patient regarding her x-ray results her lumbar spine x-rays show some degenerative changes and her hip x-rays are normal   I can put a referral to Orthopedics if she wants to see them for further evaluation

## 2022-10-13 NOTE — TELEPHONE ENCOUNTER
Called pt   And was notified and stated she did not do the labs you asked her to do as she is asking if you could look over labs from Dr Vasyl Walls from 8/18/22 and she thinks you may be able to get your results from these but if there are labs you want her to have done please let her know

## 2022-10-17 NOTE — PROGRESS NOTES
Assessment/Plan:    Venous insufficiency  This is a 29-year-old female with a recent diagnosis of Sjogren's syndrome presenting with bilateral varicosities of the thigh and lower leg  Patient overall has been asymptomatic from her varicose veins but does state she has some fatigue in her legs throughout the day  Patient had lower extremity venous duplex with reflux prior to her visit  This did show there was reflux within the bilateral proximal GSV as well as in the bilateral deep venous systems  Patient is interested in conservative management  I discussed with her the need for better fitting and appropriate graded compression socks  We will have her fitted for thigh-high compression socks 20-30 mmHg  Discussed with patient to continue to stay active as well as continue with leg elevation at night  Unsure if her fatigue is secondary to neuropathy or other factors but will attempt conservative management for her venous reflux and have her follow up in 3 months to see if there is improvement       Diagnoses and all orders for this visit:    Venous insufficiency  -     Ambulatory Referral to Vascular Surgery  -     Compression Stocking        Subjective:      Patient ID: Isaiah Kelly is a 78 y o  female  Patient is new to our practice referred by Efren Stanton  Patient had LEVDR done on 9/15/22  Patient c/o swelling of both legs and a lot of varicose veins  Patient states the varicose veins hurts  Patient wears compression and elevate her legs all the time  HPI  This is a 29-year-old female with recent diagnosis of Sjogren's syndrome, peripheral neuropathy, degenerative joint disease presenting with bilateral lower extremity varicosities of the thigh and lower leg  Patient reports her mother who is 80years old has very large edematous legs and patient is concerned that she may also develop that as she ages    At this time she denies any claudication like symptoms or nonhealing wounds  Does have some fatigue in her legs throughout the day but has not stopped her from her daily functions     She does state some discomfort around varicosity on her left leg otherwise has no significant issues with her varicose veins  Patient also denies swelling of the legs or feet  Patient states she does stay active and walks  She wears compression socks however is unsure if they are appropriate fit or grade  Patient is nonsmoker and nondrinker        The following portions of the patient's history were reviewed and updated as appropriate: allergies, current medications, past family history, past medical history, past social history, past surgical history and problem list     I have spent 25 minutes with Patient  today in which greater than 50% of this time was spent in counseling/coordination of care regarding Diagnostic results, Prognosis, Intructions for management, Patient and family education, Risk factor reductions and Impressions  Review of Systems   Constitutional: Negative  HENT: Negative  Eyes: Negative  Respiratory: Negative  Cardiovascular: Positive for leg swelling (BLE)  Gastrointestinal: Negative  Endocrine: Negative  Genitourinary: Negative  Musculoskeletal: Negative  Skin: Negative  Allergic/Immunologic: Negative  Neurological: Negative  Hematological: Negative  Psychiatric/Behavioral: Negative  Objective:      /60 (BP Location: Right arm, Patient Position: Sitting, Cuff Size: Adult)   Pulse 78   Ht 5' 5" (1 651 m)   BMI 31 25 kg/m²          Physical Exam  Constitutional:       Appearance: Normal appearance  HENT:      Head: Normocephalic  Mouth/Throat:      Mouth: Mucous membranes are moist       Pharynx: Oropharynx is clear  Eyes:      Extraocular Movements: Extraocular movements intact  Pupils: Pupils are equal, round, and reactive to light     Cardiovascular:      Rate and Rhythm: Normal rate and regular rhythm  Pulses:           Femoral pulses are 2+ on the right side and 2+ on the left side  Dorsalis pedis pulses are 2+ on the right side and 2+ on the left side  Posterior tibial pulses are 2+ on the right side and 2+ on the left side  Pulmonary:      Effort: Pulmonary effort is normal    Abdominal:      General: Abdomen is flat  Tenderness: There is no abdominal tenderness  Musculoskeletal:      Cervical back: Normal range of motion  Right lower leg: No edema  Left lower leg: No edema  Comments: Bilateral thigh and lower leg varicosities, no erythema, tenderness, wounds, or chronic skin changes   Skin:     General: Skin is warm and dry  Neurological:      General: No focal deficit present  Mental Status: She is alert and oriented to person, place, and time     Psychiatric:         Mood and Affect: Mood normal          Behavior: Behavior normal

## 2022-10-18 ENCOUNTER — CONSULT (OUTPATIENT)
Dept: VASCULAR SURGERY | Facility: CLINIC | Age: 67
End: 2022-10-18
Payer: MEDICARE

## 2022-10-18 VITALS
SYSTOLIC BLOOD PRESSURE: 100 MMHG | BODY MASS INDEX: 31.25 KG/M2 | DIASTOLIC BLOOD PRESSURE: 60 MMHG | HEIGHT: 65 IN | HEART RATE: 78 BPM

## 2022-10-18 DIAGNOSIS — I87.2 VENOUS INSUFFICIENCY: ICD-10-CM

## 2022-10-18 PROCEDURE — 99203 OFFICE O/P NEW LOW 30 MIN: CPT | Performed by: SURGERY

## 2022-10-18 NOTE — PATIENT INSTRUCTIONS
Leg Edema   AMBULATORY CARE:   Leg edema  is swelling caused by fluid buildup  Your legs may swell if you sit or stand for long periods of time, are pregnant, or are injured  Swelling may also occur if you have heart failure or circulation problems  This means that your heart does not pump blood through your body as it should  Call your local emergency number (911 in the [address]) for any of the following: You cannot walk  You have chest pain or trouble breathing that is worse when you lie down  You suddenly feel lightheaded and have trouble breathing  You have new and sudden chest pain  You may have more pain when you take deep breaths or cough  You cough up blood  Seek care immediately if:   You feel faint or confused  Your skin turns blue or gray  Your leg feels warm, tender, and painful  It may be swollen and red  Call your doctor if:   You have a fever or feel more tired than usual     The veins in your legs look larger than usual  They may look full or bulging  Your legs itch or feel heavy  You have red or white areas or sores on your legs  The skin may also appear dimpled or have indentations  You are gaining weight  You have trouble moving your ankles  The swelling does not go away, or other parts of your body swell  You have questions or concerns about your condition or care  Self-care:   Elevate your legs  Raise your legs above the level of your heart as often as you can  This will help decrease swelling and pain  Prop your legs on pillows or blankets to keep them elevated comfortably  Wear pressure stockings, if directed  These tight stockings put pressure on your legs to promote blood flow and prevent blood clots  Put them on before you get out of bed  Wear the stockings during the day  Do not wear them while you sleep  Stay active  Do not stand or sit for long periods of time   Ask your healthcare provider about the best exercise plan for you          Eat healthy foods  Healthy foods include fruits, vegetables, whole-grain breads, low-fat dairy products, beans, lean meats, and fish  Ask if you need to be on a special diet  Limit sodium (salt)  Salt will make your body hold even more fluid  Your healthcare provider will tell you how many milligrams (mg) of salt you can have each day  Follow up with your doctor as directed:  Write down your questions so you remember to ask them during your visits  © Copyright [employer] 2022 Information is for End User's use only and may not be sold, redistributed or otherwise used for commercial purposes  All illustrations and images included in CareNotes® are the copyrighted property of A D A M , Inc  or [facility] [place]   The above information is an  only  It is not intended as medical advice for individual conditions or treatments  Talk to your doctor, nurse or pharmacist before following any medical regimen to see if it is safe and effective for you

## 2022-10-18 NOTE — ASSESSMENT & PLAN NOTE
This is a 78-year-old female with a recent diagnosis of Sjogren's syndrome presenting with bilateral varicosities of the thigh and lower leg  Patient overall has been asymptomatic from her varicose veins but does state she has some fatigue in her legs throughout the day  Patient had lower extremity venous duplex with reflux prior to her visit  This did show there was reflux within the bilateral proximal GSV as well as in the bilateral deep venous systems  Patient is interested in conservative management  I discussed with her the need for better fitting and appropriate graded compression socks  We will have her fitted for thigh-high compression socks 20-30 mmHg  Discussed with patient to continue to stay active as well as continue with leg elevation at night      Unsure if her fatigue is secondary to neuropathy or other factors but will attempt conservative management for her venous reflux and have her follow up in 3 months to see if there is improvement

## 2022-10-19 ENCOUNTER — TELEPHONE (OUTPATIENT)
Dept: INTERNAL MEDICINE CLINIC | Facility: CLINIC | Age: 67
End: 2022-10-19

## 2022-10-19 NOTE — TELEPHONE ENCOUNTER
Let patient know Yudy is out of the office  I reviewed the labs she had done in August  Of the tests Yudy ordered, there was only one duplicate (CBC)  I cancelled out the duplicate CBC from Yudy since she had this test done in August  The rest of the tests that Yudy ordered, she can have done

## 2022-10-19 NOTE — TELEPHONE ENCOUNTER
PT WANTS TOM TO REVIEW PREVIOUS LABS SHE HAS HAD DONE - WANTS TO KNOW WHICH LABS TOM NEEDS HER TO DO AGAIN    PLEASE CALL PT

## 2022-10-31 ENCOUNTER — APPOINTMENT (OUTPATIENT)
Dept: LAB | Facility: CLINIC | Age: 67
End: 2022-10-31

## 2022-10-31 DIAGNOSIS — R42 DIZZINESS: ICD-10-CM

## 2022-10-31 DIAGNOSIS — E78.2 MIXED HYPERLIPIDEMIA: ICD-10-CM

## 2022-10-31 DIAGNOSIS — E55.9 VITAMIN D DEFICIENCY: ICD-10-CM

## 2022-10-31 DIAGNOSIS — E04.1 THYROID NODULE: ICD-10-CM

## 2022-10-31 LAB
25(OH)D3 SERPL-MCNC: 36.5 NG/ML (ref 30–100)
ALBUMIN SERPL BCP-MCNC: 3.8 G/DL (ref 3.5–5)
ALP SERPL-CCNC: 73 U/L (ref 46–116)
ALT SERPL W P-5'-P-CCNC: 24 U/L (ref 12–78)
ANION GAP SERPL CALCULATED.3IONS-SCNC: 8 MMOL/L (ref 4–13)
AST SERPL W P-5'-P-CCNC: 18 U/L (ref 5–45)
BILIRUB SERPL-MCNC: 0.66 MG/DL (ref 0.2–1)
BUN SERPL-MCNC: 11 MG/DL (ref 5–25)
CALCIUM SERPL-MCNC: 9.8 MG/DL (ref 8.3–10.1)
CHLORIDE SERPL-SCNC: 107 MMOL/L (ref 96–108)
CHOLEST SERPL-MCNC: 221 MG/DL
CO2 SERPL-SCNC: 23 MMOL/L (ref 21–32)
CREAT SERPL-MCNC: 0.81 MG/DL (ref 0.6–1.3)
GFR SERPL CREATININE-BSD FRML MDRD: 75 ML/MIN/1.73SQ M
GLUCOSE P FAST SERPL-MCNC: 104 MG/DL (ref 65–99)
HDLC SERPL-MCNC: 75 MG/DL
LDLC SERPL CALC-MCNC: 137 MG/DL (ref 0–100)
NONHDLC SERPL-MCNC: 146 MG/DL
POTASSIUM SERPL-SCNC: 4.3 MMOL/L (ref 3.5–5.3)
PROT SERPL-MCNC: 7.5 G/DL (ref 6.4–8.4)
SODIUM SERPL-SCNC: 138 MMOL/L (ref 135–147)
TRIGL SERPL-MCNC: 43 MG/DL
TSH SERPL DL<=0.05 MIU/L-ACNC: 2.78 UIU/ML (ref 0.45–4.5)

## 2022-11-02 ENCOUNTER — TELEPHONE (OUTPATIENT)
Dept: INTERNAL MEDICINE CLINIC | Facility: CLINIC | Age: 67
End: 2022-11-02

## 2022-11-02 NOTE — TELEPHONE ENCOUNTER
----- Message from [address] sent at 11/1/2022  7:41 PM EDT -----  Please call the patient regarding her abnormal result  Her fasting glucose is slightly elevated  Recommend patient limit carbohydrates in her diet (bread, rice, potatoes, bread, sugary beverages on sugary or starchy fruit)  Cholesterol and LDL are elevated  Decrease saturated fats and processed foods  Use healthy fats such as olive oil, avocados, coconut oil, mixed nuts, salmon  Recommend low-carbohydrate healthy fat diet with moderate amount of exercise with cardiovascular and resistance training  Will repeat labs in about 6 months

## 2022-11-03 ENCOUNTER — TELEPHONE (OUTPATIENT)
Dept: INTERNAL MEDICINE CLINIC | Facility: CLINIC | Age: 67
End: 2022-11-03

## 2022-11-15 DIAGNOSIS — F41.9 ANXIETY: ICD-10-CM

## 2022-11-15 DIAGNOSIS — G47.00 INSOMNIA, UNSPECIFIED TYPE: ICD-10-CM

## 2022-11-15 RX ORDER — ZOLPIDEM TARTRATE 10 MG/1
10 TABLET ORAL
Qty: 30 TABLET | Refills: 3 | Status: SHIPPED | OUTPATIENT
Start: 2022-11-15

## 2022-11-15 RX ORDER — LORAZEPAM 0.5 MG/1
0.5 TABLET ORAL DAILY PRN
Qty: 30 TABLET | Refills: 3 | Status: SHIPPED | OUTPATIENT
Start: 2022-11-15

## 2022-11-16 ENCOUNTER — HOSPITAL ENCOUNTER (OUTPATIENT)
Dept: NON INVASIVE DIAGNOSTICS | Facility: CLINIC | Age: 67
Discharge: HOME | End: 2022-11-16

## 2022-11-16 DIAGNOSIS — R42 DIZZINESS: ICD-10-CM

## 2022-11-16 DIAGNOSIS — R29.810 FACIAL WEAKNESS: ICD-10-CM

## 2022-11-21 ENCOUNTER — TELEPHONE (OUTPATIENT)
Dept: INTERNAL MEDICINE CLINIC | Facility: CLINIC | Age: 67
End: 2022-11-21

## 2022-11-21 NOTE — TELEPHONE ENCOUNTER
----- Message from [address] sent at 11/18/2022  5:45 PM EST -----  Please call the patient regarding her vascular study  The study shows very mild atherosclerosis of the arteries  Recommendation is a repeat study in 3 years  Diet recommendations are low saturated fat    Opt for healthy fats such as olive oil, avocados, coconut oil, mixed nuts, salmon

## 2022-12-07 PROBLEM — M48.9 CERVICAL SPINE DISEASE: Status: ACTIVE | Noted: 2022-12-07

## 2022-12-07 PROBLEM — J38.3 GLOTTIC INSUFFICIENCY: Status: ACTIVE | Noted: 2022-12-07

## 2022-12-07 PROBLEM — R13.14 PHARYNGOESOPHAGEAL DYSPHAGIA: Status: ACTIVE | Noted: 2022-12-07

## 2022-12-07 PROBLEM — M26.629 TMJPDS (TEMPOROMANDIBULAR JOINT PAIN DYSFUNCTION SYNDROME): Status: ACTIVE | Noted: 2022-12-07

## 2022-12-07 PROBLEM — R49.0 DYSPHONIA: Status: ACTIVE | Noted: 2022-12-07

## 2022-12-07 PROBLEM — R05.3 CHRONIC COUGH: Status: ACTIVE | Noted: 2022-12-07

## 2022-12-07 PROBLEM — R49.0 MUSCLE TENSION DYSPHONIA: Status: ACTIVE | Noted: 2022-12-07

## 2022-12-07 PROBLEM — R42 CERVICAL VERTIGO: Status: ACTIVE | Noted: 2022-12-07

## 2022-12-07 PROBLEM — J38.3 VOCAL FOLD ATROPHY: Status: ACTIVE | Noted: 2022-12-07

## 2022-12-07 PROBLEM — J38.3 SULCUS VOCALIS OF VOCAL FOLD: Status: ACTIVE | Noted: 2022-12-07

## 2022-12-07 PROBLEM — M35.9 UNDIFFERENTIATED CONNECTIVE TISSUE DISEASE (HCC): Status: ACTIVE | Noted: 2022-12-07

## 2023-01-10 ENCOUNTER — OFFICE VISIT (OUTPATIENT)
Dept: INTERNAL MEDICINE CLINIC | Facility: CLINIC | Age: 68
End: 2023-01-10

## 2023-01-10 VITALS
WEIGHT: 157 LBS | OXYGEN SATURATION: 97 % | HEART RATE: 85 BPM | DIASTOLIC BLOOD PRESSURE: 68 MMHG | BODY MASS INDEX: 26.16 KG/M2 | SYSTOLIC BLOOD PRESSURE: 120 MMHG | TEMPERATURE: 98.2 F | HEIGHT: 65 IN

## 2023-01-10 DIAGNOSIS — R00.2 PALPITATIONS: Primary | ICD-10-CM

## 2023-01-10 DIAGNOSIS — F13.20 BENZODIAZEPINE DEPENDENCE (HCC): ICD-10-CM

## 2023-01-11 PROBLEM — F13.20 BENZODIAZEPINE DEPENDENCE (HCC): Status: ACTIVE | Noted: 2023-01-11

## 2023-01-11 NOTE — PROGRESS NOTES
Ag    NAME: Taz Cantu  AGE: 78 y o  SEX: female  : 1955     DATE: 2023     Assessment and Plan:     1  Palpitations  History of palpitations for several years  Questionable diagnosis of atrial fibrillation  Unable to find evidence of this in her EHR  We will request medical records prior to EHR to review  EKG completed in the office today shows normal sinus rhythm  We will repeat echo and referral to cardiology for evaluation of palpitations  - Ambulatory Referral to Cardiology; Future  - Echo complete w/ contrast if indicated; Future        No follow-ups on file  Chief Complaint:     Chief Complaint   Patient presents with   • Atrial Fibrillation     discuss        History of Present Illness:     Rin Juarez was recently seen by rheumatology  Was recommended to follow-up with PCP regarding possible atrial fibrillation diagnosis that is in her record  She states she was told by previous PCP that she had atrial fibrillation  There is no testing or records that show this  She has had echocardiograms in the past and Holter monitors in the past which were normal   She has been seen by cardiology in the past but has not seen them in a number of years  Patient reports she still gets palpitations  Review of Systems:     Review of Systems   Constitutional: Negative  HENT:        Dry mouth   Respiratory: Negative  Cardiovascular: Positive for palpitations (occasional)          Problem List:     Patient Active Problem List   Diagnosis   • Mixed hyperlipidemia   • Thyroid nodule   • Chronic fatigue   • Solar degeneration   • Pain in joint, lower leg   • Plantar fasciitis   • Peripheral neuropathy   • Lower extremity tendinopathy   • Generalized anxiety disorder   • Degenerative joint disease of left ankle and foot   • PAF (paroxysmal atrial fibrillation) ([facility])   • Facial numbness   • Sjogren's syndrome without extraglandular involvement ([address] )   • Venous insufficiency   • Chronic cough   • Dysphonia   • Pharyngoesophageal dysphagia   • Undifferentiated connective tissue disease (HCC)   • Vocal fold atrophy-right   • Sulcus vocalis of vocal fold-right   • Glottic insufficiency   • Muscle tension dysphonia   • TMJPDS (temporomandibular joint pain dysfunction syndrome)   • Cervical spine disease   • Cervical vertigo   • Benzodiazepine dependence (HCC)        Objective:     /68   Pulse 85   Temp 98 2 °F (36 8 °C)   Ht 5' 5" (1 651 m)   Wt 71 2 kg (157 lb)   SpO2 97%   BMI 26 13 kg/m²     Physical Exam  Constitutional:       General: She is not in acute distress  Appearance: She is normal weight  HENT:      Head: Normocephalic and atraumatic  Right Ear: External ear normal       Left Ear: External ear normal       Nose: Nose normal       Mouth/Throat:      Mouth: Mucous membranes are moist       Pharynx: Oropharynx is clear  Eyes:      Conjunctiva/sclera: Conjunctivae normal    Cardiovascular:      Rate and Rhythm: Normal rate and regular rhythm  Pulses: Normal pulses  Heart sounds: Normal heart sounds  No murmur heard  Pulmonary:      Effort: Pulmonary effort is normal       Breath sounds: Normal breath sounds  No wheezing  Musculoskeletal:         General: Normal range of motion  Cervical back: Neck supple  Skin:     General: Skin is warm and dry  Capillary Refill: Capillary refill takes less than 2 seconds  Neurological:      Mental Status: She is alert and oriented to person, place, and time  Psychiatric:         Mood and Affect: Mood normal          Behavior: Behavior normal          Thought Content: Thought content normal          Judgment: Judgment normal          I spent 15 minutes with this patient      [address] [place] [address]  [facility]

## 2023-02-09 ENCOUNTER — HOSPITAL ENCOUNTER (OUTPATIENT)
Dept: NON INVASIVE DIAGNOSTICS | Facility: CLINIC | Age: 68
Discharge: HOME | End: 2023-02-09

## 2023-02-09 ENCOUNTER — TELEPHONE (OUTPATIENT)
Dept: INTERNAL MEDICINE CLINIC | Facility: CLINIC | Age: 68
End: 2023-02-09

## 2023-02-09 VITALS
SYSTOLIC BLOOD PRESSURE: 120 MMHG | HEIGHT: 65 IN | HEART RATE: 80 BPM | WEIGHT: 157 LBS | BODY MASS INDEX: 26.16 KG/M2 | DIASTOLIC BLOOD PRESSURE: 68 MMHG

## 2023-02-09 DIAGNOSIS — R00.2 PALPITATIONS: ICD-10-CM

## 2023-02-09 LAB
AORTIC ROOT: 3.3 CM
APICAL FOUR CHAMBER EJECTION FRACTION: 68 %
ASCENDING AORTA: 2.7 CM
AV REGURGITATION PRESSURE HALF TIME: 410 MS
E WAVE DECELERATION TIME: 183 MS
FRACTIONAL SHORTENING: 40 % (ref 28–44)
INTERVENTRICULAR SEPTUM IN DIASTOLE (PARASTERNAL SHORT AXIS VIEW): 1 CM
INTERVENTRICULAR SEPTUM: 1 CM (ref 0.6–1.1)
LAAS-AP2: 17.3 CM2
LAAS-AP4: 14.3 CM2
LEFT ATRIUM SIZE: 2.9 CM
LEFT INTERNAL DIMENSION IN SYSTOLE: 2.8 CM (ref 2.1–4)
LEFT VENTRICULAR INTERNAL DIMENSION IN DIASTOLE: 4.7 CM (ref 3.5–6)
LEFT VENTRICULAR POSTERIOR WALL IN END DIASTOLE: 0.7 CM
LEFT VENTRICULAR STROKE VOLUME: 71 ML
LVSV (TEICH): 71 ML
MV E'TISSUE VEL-SEP: 7 CM/S
MV PEAK A VEL: 1 M/S
MV PEAK E VEL: 93 CM/S
MV STENOSIS PRESSURE HALF TIME: 53 MS
MV VALVE AREA P 1/2 METHOD: 4.15 CM2
RA PRESSURE ESTIMATED: 8 MMHG
RIGHT ATRIAL 2D VOLUME: 26 ML
RIGHT ATRIUM AREA SYSTOLE A4C: 12.6 CM2
RIGHT VENTRICLE ID DIMENSION: 3.1 CM
RV PSP: 30 MMHG
SL CV AV DECELERATION TIME RETROGRADE: 1413 MS
SL CV AV PEAK GRADIENT RETROGRADE: 73 MMHG
SL CV LEFT ATRIUM LENGTH A2C: 5.1 CM
SL CV LV EF: 60
SL CV PED ECHO LEFT VENTRICLE DIASTOLIC VOLUME (MOD BIPLANE) 2D: 102 ML
SL CV PED ECHO LEFT VENTRICLE SYSTOLIC VOLUME (MOD BIPLANE) 2D: 31 ML
TR MAX PG: 22 MMHG
TR PEAK VELOCITY: 2.3 M/S
TRICUSPID ANNULAR PLANE SYSTOLIC EXCURSION: 2.3 CM
TRICUSPID VALVE PEAK REGURGITATION VELOCITY: 2.33 M/S

## 2023-02-11 PROBLEM — M85.89 OSTEOPENIA OF MULTIPLE SITES: Status: ACTIVE | Noted: 2023-02-11

## 2023-02-11 PROBLEM — G62.9 SMALL FIBER NEUROPATHY: Status: ACTIVE | Noted: 2023-02-11

## 2023-02-20 NOTE — PROGRESS NOTES
Cardiology Consultation     Angelo Shepard  [id number]  1955  HEART & VASCULAR [facility] [place] [address]      1  Palpitations  Ambulatory Referral to Cardiology    AMB extended holter monitor      2  Dyslipidemia  CT coronary calcium score          Discussion/Summary:  Ms Andrew Grossman is a pleasant 61-year-old female who presents to the office today for the evaluation of palpitations  She does have palpitations on a regular basis described as a rapid heartbeat  They wake her from sleep  Previously she was labeled as having atrial fibrillation but details of this are unclear  Any ECGs done in our system or at [facility] have revealed normal sinus rhythm  Also she underwent an event recorder in 2016 which was without fibrillation  Nonetheless now that she has palpitations I have asked for 2-week Zio patch to look for symptom-rhythm correlation  Otherwise her blood pressure is well controlled on no medication  Her most recent lipids reveal slightly suboptimal numbers  Based on current guidelines no therapy is indicated  We discussed further risk stratification with a calcium score to which she is agreeable  Follow-up will be arranged based on the above-noted testing  History of Present Illness:  Ms Andrew Grossman is a very pleasant 61-year-old female who presents to the office today for the evaluation of palpitations  She states that she can have palpitations on a nightly basis described as a rapid heartbeat  She wakes from sleep at night because of these  If she gets up and walks around her symptoms will gradually resolve within about 10 minutes  They can happen for a month in a row and then there are times where she does not have symptoms for a bit of time  Her most recent episode was last night  The palpitations are not associated with any other symptoms    She denies signs or symptoms of obstructive sleep apnea  She states she thinks she remembers being told by her primary care provider a number of years ago she had atrial fibrillation  He has since retired  She has seen cardiologists in the past none of which have documented atrial fibrillation as a diagnosis  Work-up has included an event monitor done in 2016 which revealed isolated PACs  Her symptoms did not correlate with any arrhythmia  She also underwent an echocardiogram earlier this month revealing preserved biventricular function, grade 1 diastolic dysfunction and mild valvular heart disease  She is active  She states she rides a stationary bike for about 1/2-hour on a regular basis  She also walks outdoors for about an hour  In doing so she feels well  She denies any exertional chest pain or shortness of breath  She denies any signs or symptoms of congestive heart failure including lower extremityedema, paroxysmal nocturnal dyspnea, orthopnea, acute weight gain or increasing abdominal girth  She denies lightheadedness, syncope or presyncope  She denies symptoms of claudication       Patient Active Problem List   Diagnosis   • Dyslipidemia   • Thyroid nodule   • Chronic fatigue   • Solar degeneration   • Pain in joint, lower leg   • Plantar fasciitis   • Peripheral neuropathy   • Lower extremity tendinopathy   • Generalized anxiety disorder   • Degenerative joint disease of left ankle and foot   • Palpitations   • Facial numbness   • Sjogren's syndrome without extraglandular involvement (HCC)   • Venous insufficiency   • Chronic cough   • Dysphonia   • Pharyngoesophageal dysphagia   • Undifferentiated connective tissue disease (HCC)   • Vocal fold atrophy-right   • Sulcus vocalis of vocal fold-right   • Glottic insufficiency   • Muscle tension dysphonia   • TMJPDS (temporomandibular joint pain dysfunction syndrome)   • Cervical spine disease   • Cervical vertigo   • Benzodiazepine dependence (HCC)   • Osteopenia of multiple sites   • Small fiber neuropathy   • PAF (paroxysmal atrial fibrillation) (HCC)     Past Medical History:   Diagnosis Date   • Atrial fibrillation ([facility] [address] ) 02/06/2014   • Hyperlipidemia    • Situational anxiety    • Situational insomnia    • Sjogren's syndrome (HCC)      Social History     Socioeconomic History   • Marital status: /Civil Union     Spouse name: Not on file   • Number of children: Not on file   • Years of education: Not on file   • Highest education level: Not on file   Occupational History     Comment: Works for an    Tobacco Use   • Smoking status: Never   • Smokeless tobacco: Never   • Tobacco comments:     Denied:  History of smoking cigarettes   Vaping Use   • Vaping Use: Never used   Substance and Sexual Activity   • Alcohol use: No   • Drug use: No   • Sexual activity: Not Currently   Other Topics Concern   • Not on file   Social History Narrative   • Not on file     Social Determinants of Health     Financial Resource Strain: Low Risk    • Difficulty of Paying Living Expenses: Not hard at all   Food Insecurity: Not on file   Transportation Needs: No Transportation Needs   • Lack of Transportation (Medical): No   • Lack of Transportation (Non-Medical): No   Physical Activity: Not on file   Stress: Not on file   Social Connections: Not on file   Intimate Partner Violence: Not on file   Housing Stability: Not on file      Family History   Problem Relation Age of Onset   • Heart failure Mother    • Hypertension Mother    • Edema Mother         lymphedema   • Celiac disease Father    • Other Father         Pacemaker placement   • Heart disease Father    • Hypertension Father    • Prostate cancer Father    • Dementia Father    • Gout Father    • Heart failure Father    • Celiac disease Daughter    • Allergies Daughter    • Other Family         Congenital heart defect     History reviewed  No pertinent surgical history      Current Outpatient Medications:   •  Ascorbic Acid (vitamin C) 1000 MG tablet, Take 1,000 mg by mouth daily, Disp: , Rfl:   •  Cequa 0 09 % SOLN, PLEASE SEE ATTACHED FOR DETAILED DIRECTIONS, Disp: , Rfl:   •  Cholecalciferol (Vitamin D3) 125 MCG (5000 UT) TABS, Take 5,000 Units by mouth daily, Disp: , Rfl:   •  glucosamine-chondroitin 500-400 MG tablet, Take 1 tablet by mouth daily, Disp: , Rfl:   •  LORazepam (ATIVAN) 0 5 mg tablet, Take 1 tablet (0 5 mg total) by mouth daily as needed for anxiety, Disp: 30 tablet, Rfl: 3  •  MAGNESIUM GLUCONATE PO, Take 120 mg by mouth daily, Disp: , Rfl:   •  MELATONIN ER PO, Take 0 5 mg by mouth daily, Disp: , Rfl:   •  Multiple Vitamins-Minerals (HAIR SKIN NAILS PO), Take 2 capsules by mouth daily, Disp: , Rfl:   •  QUERCETIN PO, Take 500 mg by mouth daily, Disp: , Rfl:   •  SODIUM FLUORIDE, DENTAL GEL, 1 1 % CREA, Use on teeth nightly, Disp: 112 g, Rfl: 3  •  TURMERIC CURCUMIN PO, Take 1 g by mouth daily, Disp: , Rfl:   •  zolpidem (AMBIEN) 10 mg tablet, Take 1 tablet (10 mg total) by mouth daily at bedtime as needed for sleep, Disp: 30 tablet, Rfl: 3  •  Docosahexaenoic Acid (DHA PO), Take 500 mg by mouth daily (Patient not taking: Reported on 2/21/2023), Disp: , Rfl:   •  famotidine (PEPCID) 40 MG tablet, Take 1 tablet (40 mg total) by mouth daily (Patient not taking: Reported on 2/21/2023), Disp: 30 tablet, Rfl: 3  •  GLUCOSAMINE-CHONDROITIN-MSM PO, Take 300 mg by mouth 3 (three) times a day (Patient not taking: Reported on 2/21/2023), Disp: , Rfl:   •  loteprednol etabonate (LOTEMAX) 0 5 % ophthalmic suspension, Administer 1 drop to both eyes in the morning (Patient not taking: Reported on 12/12/2022), Disp: , Rfl:   •  MANGANESE PO, Take 2 mg by mouth (Patient not taking: Reported on 2/21/2023), Disp: , Rfl:   •  multivitamin (THERAGRAN) TABS, Take 1 tablet by mouth daily (Patient not taking: Reported on 2/21/2023), Disp: , Rfl:   •  NIACIN PO, Take 20 mg by mouth (Patient not taking: Reported on 2/21/2023), Disp: , Rfl:   •  Zinc 30 MG TABS, Take 30 mg by mouth (Patient not taking: Reported on 2/21/2023), Disp: , Rfl:   No Known Allergies        Imaging: Echo complete w/ contrast if indicated    Result Date: 2/9/2023  Narrative: •  Left Ventricle: Left ventricular cavity size is normal  Wall thickness is normal  The left ventricular ejection fraction is 60%  Systolic function is normal  Wall motion is normal  Diastolic function is mildly abnormal, consistent with grade I (abnormal) relaxation  •  Aortic Valve: There is mild regurgitation  •  Tricuspid Valve: There is mild regurgitation  ECG: Normal sinus rhythm, normal ECG    Review of Systems:  Review of Systems   Respiratory: Negative for cough, shortness of breath, wheezing and stridor  Cardiovascular: Positive for palpitations  Negative for chest pain and leg swelling  Psychiatric/Behavioral: The patient is nervous/anxious  All other systems reviewed and are negative          Vitals:    02/21/23 0954   BP: 122/68   BP Location: Left arm   Patient Position: Sitting   Cuff Size: Standard   Pulse: 88   Weight: 69 7 kg (153 lb 11 2 oz)   Height: 5' 5" (1 651 m)     Vitals:    02/21/23 0954   Weight: 69 7 kg (153 lb 11 2 oz)     Height: 5' 5" (165 1 cm)     Physical Exam:  General appearance:  Appears stated age, alert, well appearing and in no distress  HEENT:  PERRLA, EOMI, no scleral icterus, no conjunctival pallor  NECK:  Supple, No elevated JVP, no thyromegaly, no carotid bruits  HEART:  Regular rate and rhythm, normal S1/S2, no S3/S4, no murmur or rub  LUNGS:  Clear to auscultation bilaterally  ABDOMEN:  Soft, non-tender, positive bowel sounds, no rebound or guarding, no organomegaly   EXTREMITIES:  No edema  VASCULAR:  Normal pedal pulses   SKIN: No lesions or rashes on exposed skin  NEURO:  CN II-XII intact, no focal deficits

## 2023-02-21 ENCOUNTER — OFFICE VISIT (OUTPATIENT)
Dept: CARDIOLOGY CLINIC | Facility: CLINIC | Age: 68
End: 2023-02-21

## 2023-02-21 VITALS
DIASTOLIC BLOOD PRESSURE: 68 MMHG | SYSTOLIC BLOOD PRESSURE: 122 MMHG | HEIGHT: 65 IN | BODY MASS INDEX: 25.61 KG/M2 | WEIGHT: 153.7 LBS | HEART RATE: 88 BPM

## 2023-02-21 DIAGNOSIS — E78.5 DYSLIPIDEMIA: ICD-10-CM

## 2023-02-21 DIAGNOSIS — R00.2 PALPITATIONS: Primary | ICD-10-CM

## 2023-02-21 PROBLEM — I48.0 PAF (PAROXYSMAL ATRIAL FIBRILLATION) (HCC): Status: ACTIVE | Noted: 2023-02-21

## 2023-02-23 DIAGNOSIS — F41.9 ANXIETY: ICD-10-CM

## 2023-02-23 RX ORDER — LORAZEPAM 0.5 MG/1
0.5 TABLET ORAL DAILY PRN
Qty: 30 TABLET | Refills: 3 | Status: SHIPPED | OUTPATIENT
Start: 2023-02-23

## 2023-03-07 ENCOUNTER — HOSPITAL ENCOUNTER (OUTPATIENT)
Dept: CT IMAGING | Facility: HOSPITAL | Age: 68
Discharge: HOME | End: 2023-03-07
Attending: INTERNAL MEDICINE

## 2023-03-07 DIAGNOSIS — E78.5 DYSLIPIDEMIA: ICD-10-CM

## 2023-03-31 NOTE — TELEPHONE ENCOUNTER
Spoke to [address] [place] [address] [phone] opt 9 then opt 7  [facility] could not process lab results as they had incorrect , which they will update in the system today  Lab results will be faxed to [facility] today    Pt informed and told we will call when results come in   Pt's # [phone] Home

## 2023-06-07 ENCOUNTER — TELEPHONE (OUTPATIENT)
Age: 68
End: 2023-06-07

## 2023-06-07 DIAGNOSIS — E04.1 THYROID NODULE: ICD-10-CM

## 2023-06-07 DIAGNOSIS — E78.5 DYSLIPIDEMIA: Primary | ICD-10-CM

## 2023-06-07 DIAGNOSIS — E55.9 VITAMIN D DEFICIENCY: ICD-10-CM

## 2023-06-07 NOTE — TELEPHONE ENCOUNTER
Pt went to see her Rheumatologist yesterday and she would like her to have labs done  Pt has sjorgens and needs lab orders put in by her PCP     Call pt when done [phone]

## 2023-06-12 ENCOUNTER — APPOINTMENT (OUTPATIENT)
Dept: LAB | Facility: CLINIC | Age: 68
End: 2023-06-12
Payer: MEDICARE

## 2023-06-12 DIAGNOSIS — F41.9 ANXIETY: ICD-10-CM

## 2023-06-12 DIAGNOSIS — E78.5 DYSLIPIDEMIA: ICD-10-CM

## 2023-06-12 DIAGNOSIS — M35.9 UNDIFFERENTIATED CONNECTIVE TISSUE DISEASE (HCC): ICD-10-CM

## 2023-06-12 DIAGNOSIS — E04.1 THYROID NODULE: ICD-10-CM

## 2023-06-12 DIAGNOSIS — E55.9 VITAMIN D DEFICIENCY: ICD-10-CM

## 2023-06-12 DIAGNOSIS — G47.00 INSOMNIA, UNSPECIFIED TYPE: ICD-10-CM

## 2023-06-12 LAB
25(OH)D3 SERPL-MCNC: 64.7 NG/ML (ref 30–100)
ALBUMIN SERPL BCP-MCNC: 3.6 G/DL (ref 3.5–5)
ALP SERPL-CCNC: 74 U/L (ref 46–116)
ALT SERPL W P-5'-P-CCNC: 26 U/L (ref 12–78)
ANION GAP SERPL CALCULATED.3IONS-SCNC: 2 MMOL/L (ref 4–13)
AST SERPL W P-5'-P-CCNC: 21 U/L (ref 5–45)
BASOPHILS # BLD AUTO: 0.06 THOUSANDS/ÂΜL (ref 0–0.1)
BASOPHILS NFR BLD AUTO: 1 % (ref 0–1)
BILIRUB SERPL-MCNC: 0.54 MG/DL (ref 0.2–1)
BUN SERPL-MCNC: 12 MG/DL (ref 5–25)
C3 SERPL-MCNC: 108 MG/DL (ref 90–180)
C4 SERPL-MCNC: 21 MG/DL (ref 10–40)
CALCIUM SERPL-MCNC: 9.1 MG/DL (ref 8.3–10.1)
CHLORIDE SERPL-SCNC: 106 MMOL/L (ref 96–108)
CHOLEST SERPL-MCNC: 203 MG/DL
CO2 SERPL-SCNC: 29 MMOL/L (ref 21–32)
CREAT SERPL-MCNC: 0.75 MG/DL (ref 0.6–1.3)
CRP SERPL QL: <3 MG/L
EOSINOPHIL # BLD AUTO: 0.17 THOUSAND/ÂΜL (ref 0–0.61)
EOSINOPHIL NFR BLD AUTO: 3 % (ref 0–6)
ERYTHROCYTE [DISTWIDTH] IN BLOOD BY AUTOMATED COUNT: 12.7 % (ref 11.6–15.1)
GFR SERPL CREATININE-BSD FRML MDRD: 83 ML/MIN/1.73SQ M
GLUCOSE P FAST SERPL-MCNC: 92 MG/DL (ref 65–99)
HCT VFR BLD AUTO: 37 % (ref 34.8–46.1)
HDLC SERPL-MCNC: 79 MG/DL
HGB BLD-MCNC: 12.1 G/DL (ref 11.5–15.4)
IMM GRANULOCYTES # BLD AUTO: 0.02 THOUSAND/UL (ref 0–0.2)
IMM GRANULOCYTES NFR BLD AUTO: 0 % (ref 0–2)
LDLC SERPL CALC-MCNC: 115 MG/DL (ref 0–100)
LYMPHOCYTES # BLD AUTO: 1.77 THOUSANDS/ÂΜL (ref 0.6–4.47)
LYMPHOCYTES NFR BLD AUTO: 36 % (ref 14–44)
MCH RBC QN AUTO: 30.6 PG (ref 26.8–34.3)
MCHC RBC AUTO-ENTMCNC: 32.7 G/DL (ref 31.4–37.4)
MCV RBC AUTO: 93 FL (ref 82–98)
MONOCYTES # BLD AUTO: 0.44 THOUSAND/ÂΜL (ref 0.17–1.22)
MONOCYTES NFR BLD AUTO: 9 % (ref 4–12)
NEUTROPHILS # BLD AUTO: 2.47 THOUSANDS/ÂΜL (ref 1.85–7.62)
NEUTS SEG NFR BLD AUTO: 51 % (ref 43–75)
NRBC BLD AUTO-RTO: 0 /100 WBCS
PLATELET # BLD AUTO: 269 THOUSANDS/UL (ref 149–390)
PMV BLD AUTO: 9.7 FL (ref 8.9–12.7)
POTASSIUM SERPL-SCNC: 4.6 MMOL/L (ref 3.5–5.3)
PROT SERPL-MCNC: 6.8 G/DL (ref 6.4–8.4)
RBC # BLD AUTO: 3.96 MILLION/UL (ref 3.81–5.12)
SODIUM SERPL-SCNC: 137 MMOL/L (ref 135–147)
TRIGL SERPL-MCNC: 45 MG/DL
TSH SERPL DL<=0.05 MIU/L-ACNC: 3.49 UIU/ML (ref 0.45–4.5)
WBC # BLD AUTO: 4.93 THOUSAND/UL (ref 4.31–10.16)

## 2023-06-12 PROCEDURE — 85025 COMPLETE CBC W/AUTO DIFF WBC: CPT

## 2023-06-12 PROCEDURE — 84443 ASSAY THYROID STIM HORMONE: CPT

## 2023-06-12 PROCEDURE — 80061 LIPID PANEL: CPT

## 2023-06-12 PROCEDURE — 82306 VITAMIN D 25 HYDROXY: CPT

## 2023-06-12 RX ORDER — LORAZEPAM 0.5 MG/1
0.5 TABLET ORAL DAILY PRN
Qty: 30 TABLET | Refills: 3 | Status: SHIPPED | OUTPATIENT
Start: 2023-06-12

## 2023-06-12 RX ORDER — ZOLPIDEM TARTRATE 10 MG/1
10 TABLET ORAL
Qty: 30 TABLET | Refills: 3 | Status: SHIPPED | OUTPATIENT
Start: 2023-06-12

## 2023-06-13 ENCOUNTER — TELEPHONE (OUTPATIENT)
Age: 68
End: 2023-06-13

## 2023-06-13 NOTE — TELEPHONE ENCOUNTER
WANTED  TO  SAY  THANK  YOU     TO  TOM GARNETT  HEARD  THAT  YOU  ARE  LEAVING    WANTED  TO  WISH  YOU  GOOD  LUCK

## 2023-06-14 LAB — DSDNA AB SER QL CLIF: NEGATIVE

## 2023-07-13 ENCOUNTER — TELEPHONE (OUTPATIENT)
Age: 68
End: 2023-07-13

## 2023-07-17 ENCOUNTER — RA CDI HCC (OUTPATIENT)
Dept: OTHER | Facility: HOSPITAL | Age: 68
End: 2023-07-17

## 2023-07-17 NOTE — PROGRESS NOTES
[facility] [address] [facility] coding opportunities       Chart reviewed, no opportunity found: CHART REVIEWED, NO OPPORTUNITY FOUND        Patients Insurance     Medicare Insurance: Medicare

## 2023-07-21 ENCOUNTER — OFFICE VISIT (OUTPATIENT)
Age: 68
End: 2023-07-21
Payer: MEDICARE

## 2023-07-21 VITALS
OXYGEN SATURATION: 99 % | HEART RATE: 85 BPM | RESPIRATION RATE: 20 BRPM | SYSTOLIC BLOOD PRESSURE: 116 MMHG | WEIGHT: 146.8 LBS | TEMPERATURE: 97.8 F | DIASTOLIC BLOOD PRESSURE: 72 MMHG | HEIGHT: 65 IN | BODY MASS INDEX: 24.46 KG/M2

## 2023-07-21 DIAGNOSIS — Z12.11 COLON CANCER SCREENING: ICD-10-CM

## 2023-07-21 DIAGNOSIS — Z76.89 ENCOUNTER TO ESTABLISH CARE: Primary | ICD-10-CM

## 2023-07-21 DIAGNOSIS — M35.00 SJOGREN'S SYNDROME WITHOUT EXTRAGLANDULAR INVOLVEMENT (HCC): ICD-10-CM

## 2023-07-21 DIAGNOSIS — F41.9 ANXIETY: ICD-10-CM

## 2023-07-21 DIAGNOSIS — G47.00 INSOMNIA, UNSPECIFIED TYPE: ICD-10-CM

## 2023-07-21 DIAGNOSIS — I87.2 VENOUS INSUFFICIENCY: ICD-10-CM

## 2023-07-21 DIAGNOSIS — I48.0 PAF (PAROXYSMAL ATRIAL FIBRILLATION) (HCC): ICD-10-CM

## 2023-07-21 PROCEDURE — 99214 OFFICE O/P EST MOD 30 MIN: CPT

## 2023-07-21 RX ORDER — LORAZEPAM 0.5 MG/1
0.5 TABLET ORAL DAILY PRN
Qty: 30 TABLET | Refills: 0 | Status: SHIPPED | OUTPATIENT
Start: 2023-07-21

## 2023-07-21 RX ORDER — ZOLPIDEM TARTRATE 10 MG/1
10 TABLET ORAL
Qty: 30 TABLET | Refills: 0 | Status: SHIPPED | OUTPATIENT
Start: 2023-07-21

## 2023-07-21 NOTE — PROGRESS NOTES
Name: Mel Rubio      : 1955      MRN: [id number]  Encounter Provider: DIANE Land  Encounter Date: 2023   Encounter department: [address]     1. Encounter to establish care  Presents this visit to establish care with provider, patient is established with the practice, changing provider. 2. Sjogren's syndrome without extraglandular involvement [facility])  Patient follows with Dr. Mel Dominguez in [facility] last seen in  and follows up every 6 months. Patient takes supplements    3. Venous insufficiency  States occasional swelling to bilateral legs, elevates as needed. Denies any problems at this time. States has followed with vascular surgeon in the past.  Has varicose veins, states they are not causing any pain at this time. 4. PAF (paroxysmal atrial fibrillation) ([address])  Noted to have history of A-fib, per most recent cardiology note patient had an event monitor in 2016 and testing which did not show A-fib. Most recent EKGs normal sinus rhythm. Patient not on any anticoagulation or any medication. Upon exam today, heart rate regular. 5. Anxiety  Patient takes anxious, states usually has problems at night, also has been more anxious after the recent passing of her father a few months ago. PDMP reviewed, has been on Ativan for the last 2 years refill sent. -     LORazepam (ATIVAN) 0.5 mg tablet; Take 1 tablet (0.5 mg total) by mouth daily as needed for anxiety    6. Colon cancer screening  Patient agreeable to colon cancer screening, order placed. -     Ambulatory Referral to Gastroenterology; Future    7. Insomnia  States has been having trouble sleeping, has worsened since the passing of her father. States he usually uses Ambien when traveling occasionally. Due to the insomnia and anxiety patient has been also doing therapy. Ambien refilled. -     zolpidem (AMBIEN) 10 mg tablet;  Take 1 tablet (10 mg total) by mouth daily at bedtime as needed for sleep    Subjective      Patient presents this visit to establish with provider. Patient states her main problem is that she has Sjogren's syndrome, states also recently lost her father which has increased anxiety and insomnia levels. States she takes care of her 40-year-old mother who is grieving, which is also increasing anxiety levels. Patient follows with rheumatologist for management of Sjogren's also follows with a functional medicine doctor, who is also her daughter and supports her. Review of Systems   Constitutional: Negative for chills and fever. HENT: Negative for ear pain and sore throat. Eyes: Negative for pain and visual disturbance. Respiratory: Negative for cough and shortness of breath. Cardiovascular: Negative for chest pain and palpitations. Gastrointestinal: Negative for abdominal pain and vomiting. Genitourinary: Negative for dysuria and hematuria. Musculoskeletal: Negative for arthralgias and back pain. Skin: Negative for color change and rash. Neurological: Negative for seizures and syncope. Numb mouth, dry mouth, eye from sjogrens   Psychiatric/Behavioral: Negative for sleep disturbance. All other systems reviewed and are negative.       Current Outpatient Medications on File Prior to Visit   Medication Sig   • Ascorbic Acid (vitamin C) 1000 MG tablet Take 1,000 mg by mouth daily   • Cequa 0.09 % SOLN PLEASE SEE ATTACHED FOR DETAILED DIRECTIONS   • Cholecalciferol (Vitamin D3) 125 MCG (5000 UT) TABS Take 5,000 Units by mouth daily   • glucosamine-chondroitin 500-400 MG tablet Take 1 tablet by mouth daily   • MAGNESIUM GLUCONATE PO Take 120 mg by mouth daily   • MELATONIN ER PO Take 0.5 mg by mouth daily   • Multiple Vitamins-Minerals (HAIR SKIN NAILS PO) Take 2 capsules by mouth daily   • SODIUM FLUORIDE, DENTAL GEL, 1.1 % CREA Use on teeth nightly   • TURMERIC CURCUMIN PO Take 1 g by mouth daily   • Zinc 30 MG TABS Take 30 mg by mouth   • [DISCONTINUED] LORazepam (ATIVAN) 0.5 mg tablet Take 1 tablet (0.5 mg total) by mouth daily as needed for anxiety   • Docosahexaenoic Acid (DHA PO) Take 500 mg by mouth daily (Patient not taking: Reported on 2/21/2023)   • famotidine (PEPCID) 40 MG tablet Take 1 tablet (40 mg total) by mouth daily (Patient not taking: Reported on 2/21/2023)   • GLUCOSAMINE-CHONDROITIN-MSM PO Take 300 mg by mouth 3 (three) times a day (Patient not taking: Reported on 2/21/2023)   • loteprednol etabonate (LOTEMAX) 0.5 % ophthalmic suspension Administer 1 drop to both eyes in the morning (Patient not taking: Reported on 12/12/2022)   • MANGANESE PO Take 2 mg by mouth (Patient not taking: Reported on 2/21/2023)   • multivitamin (THERAGRAN) TABS Take 1 tablet by mouth daily (Patient not taking: Reported on 2/21/2023)   • NIACIN PO Take 20 mg by mouth (Patient not taking: Reported on 2/21/2023)   • QUERCETIN PO Take 500 mg by mouth daily   • [DISCONTINUED] zolpidem (AMBIEN) 10 mg tablet Take 1 tablet (10 mg total) by mouth daily at bedtime as needed for sleep (Patient not taking: Reported on 7/21/2023)       Objective     /72 (BP Location: Left arm, Patient Position: Sitting, Cuff Size: Standard)   Pulse 85   Temp 97.8 °F (36.6 °C) (Tympanic)   Resp 20   Ht 5' 5" (1.651 m)   Wt 66.6 kg (146 lb 12.8 oz)   SpO2 99%   BMI 24.43 kg/m²     Physical Exam  Vitals reviewed. Constitutional:       Appearance: Normal appearance. HENT:      Head: Normocephalic. Right Ear: Tympanic membrane normal. There is no impacted cerumen. Left Ear: Tympanic membrane normal. There is no impacted cerumen. Nose: Nose normal. No congestion or rhinorrhea. Mouth/Throat:      Mouth: Mucous membranes are moist.      Pharynx: No posterior oropharyngeal erythema. Eyes:      Extraocular Movements: Extraocular movements intact.       Conjunctiva/sclera: Conjunctivae normal.      Pupils: Pupils are equal, round, and reactive to light. Cardiovascular:      Rate and Rhythm: Normal rate and regular rhythm. Pulses: Normal pulses. Heart sounds: Normal heart sounds. Pulmonary:      Effort: Pulmonary effort is normal. No respiratory distress. Breath sounds: Normal breath sounds. Abdominal:      General: Bowel sounds are normal.      Palpations: There is no mass. Musculoskeletal:         General: No tenderness. Normal range of motion. Cervical back: Normal range of motion. Skin:     General: Skin is warm and dry. Neurological:      Mental Status: She is alert and oriented to person, place, and time. Psychiatric:         Mood and Affect: Mood normal.         Behavior: Behavior normal.         Thought Content:  Thought content normal.         Judgment: Judgment normal.       DIANE Martini

## 2023-07-28 NOTE — TELEPHONE ENCOUNTER
PT  ROC   MADE  AN APPT  FOR  [facility] SHOT  FOR  Friday  BUT  NOW SHE  WOULD  LIKE  TO COME  IN  TODAY  SHE  SAID  SHE  COULD  BE  HERE  IN HOUR  TIME  CAN  SHE  COME  OVER   CALL PT  [id number] knee

## 2023-08-16 ENCOUNTER — OFFICE VISIT (OUTPATIENT)
Age: 68
End: 2023-08-16
Payer: MEDICARE

## 2023-08-16 VITALS
DIASTOLIC BLOOD PRESSURE: 64 MMHG | RESPIRATION RATE: 18 BRPM | TEMPERATURE: 97.8 F | WEIGHT: 146 LBS | OXYGEN SATURATION: 99 % | SYSTOLIC BLOOD PRESSURE: 104 MMHG | HEIGHT: 65 IN | HEART RATE: 85 BPM | BODY MASS INDEX: 24.32 KG/M2

## 2023-08-16 DIAGNOSIS — L29.9 PRURITIC DERMATITIS: Primary | ICD-10-CM

## 2023-08-16 PROCEDURE — 99214 OFFICE O/P EST MOD 30 MIN: CPT

## 2023-08-16 RX ORDER — PREDNISONE 10 MG/1
TABLET ORAL
Qty: 30 TABLET | Refills: 0 | Status: SHIPPED | OUTPATIENT
Start: 2023-08-16 | End: 2023-08-26

## 2023-08-16 RX ORDER — HYDROXYZINE HYDROCHLORIDE 25 MG/1
25 TABLET, FILM COATED ORAL EVERY 6 HOURS PRN
Qty: 30 TABLET | Refills: 0 | Status: SHIPPED | OUTPATIENT
Start: 2023-08-16

## 2023-08-16 NOTE — PROGRESS NOTES
Name: Fausto Valle      : 1955      MRN: [id number]  Encounter Provider: DIANE Delacruz  Encounter Date: 2023   Encounter department: [address] [address]. Pruritic dermatitis  Started about 3 weeks ago, used over-the-counter meds no improvement. Denies any new regimen, lotions, soaps, foods, clothes. Upon exam, ocular papular raised rash consistent with hives. Contact dermatitis versus allergic dermatitis vs other nonspecific skin eruptions. Likely an inflammatory process, not on steroids and also ordered an antihistamine for the itching. Follow-up in 2 weeks, will consider dermatotology referral if no improvement. Instructed to notify office for worsening symptoms, go to the ER for breathing difficulties. -     predniSONE 10 mg tablet; Take 4 tablets (40 mg total) by mouth daily for 4 days, THEN 3 tablets (30 mg total) daily for 3 days, THEN 2 tablets (20 mg total) daily for 2 days, THEN 1 tablet (10 mg total) daily for 1 day. -     hydrOXYzine HCL (ATARAX) 25 mg tablet; Take 1 tablet (25 mg total) by mouth every 6 (six) hours as needed for itching       Subjective      Rash  This is a new problem. The current episode started 1 to 4 weeks ago. The problem has been waxing and waning since onset. The affected locations include the left arm and right arm. The problem is moderate. The rash is characterized by dryness, itchiness, redness and swelling. She was exposed to nothing. The rash first occurred at home. Pertinent negatives include no cough, decreased sleep, drinking less, diarrhea, facial edema, fever, itching, shortness of breath, sore throat or vomiting. Review of Systems   Constitutional: Negative for chills and fever. HENT: Negative for ear pain and sore throat. Eyes: Negative for pain and visual disturbance. Respiratory: Negative for cough and shortness of breath.     Cardiovascular: Negative for chest pain and palpitations. Gastrointestinal: Negative for abdominal pain, diarrhea and vomiting. Genitourinary: Negative for dysuria and hematuria. Musculoskeletal: Negative for arthralgias and back pain. Skin: Positive for color change and rash. Negative for itching. Neurological: Negative for seizures and syncope. All other systems reviewed and are negative.       Current Outpatient Medications on File Prior to Visit   Medication Sig   • Ascorbic Acid (vitamin C) 1000 MG tablet Take 1,000 mg by mouth daily   • Cequa 0.09 % SOLN PLEASE SEE ATTACHED FOR DETAILED DIRECTIONS   • Cholecalciferol (Vitamin D3) 125 MCG (5000 UT) TABS Take 5,000 Units by mouth daily   • glucosamine-chondroitin 500-400 MG tablet Take 1 tablet by mouth daily   • LORazepam (ATIVAN) 0.5 mg tablet Take 1 tablet (0.5 mg total) by mouth daily as needed for anxiety   • MAGNESIUM GLUCONATE PO Take 120 mg by mouth daily   • MELATONIN ER PO Take 0.5 mg by mouth daily   • Multiple Vitamins-Minerals (HAIR SKIN NAILS PO) Take 2 capsules by mouth daily   • QUERCETIN PO Take 500 mg by mouth daily   • SODIUM FLUORIDE, DENTAL GEL, 1.1 % CREA Use on teeth nightly   • TURMERIC CURCUMIN PO Take 1 g by mouth daily   • Zinc 30 MG TABS Take 30 mg by mouth   • zolpidem (AMBIEN) 10 mg tablet Take 1 tablet (10 mg total) by mouth daily at bedtime as needed for sleep   • Docosahexaenoic Acid (DHA PO) Take 500 mg by mouth daily (Patient not taking: Reported on 2/21/2023)   • famotidine (PEPCID) 40 MG tablet Take 1 tablet (40 mg total) by mouth daily (Patient not taking: Reported on 2/21/2023)   • GLUCOSAMINE-CHONDROITIN-MSM PO Take 300 mg by mouth 3 (three) times a day (Patient not taking: Reported on 2/21/2023)   • loteprednol etabonate (LOTEMAX) 0.5 % ophthalmic suspension Administer 1 drop to both eyes in the morning (Patient not taking: Reported on 12/12/2022)   • MANGANESE PO Take 2 mg by mouth (Patient not taking: Reported on 2/21/2023)   • multivitamin [facility]) TABS Take 1 tablet by mouth daily (Patient not taking: Reported on 2/21/2023)   • NIACIN PO Take 20 mg by mouth (Patient not taking: Reported on 2/21/2023)       Objective     /64 (BP Location: Left arm, Patient Position: Sitting, Cuff Size: Standard)   Pulse 85   Temp 97.8 °F (36.6 °C) (Tympanic)   Resp 18   Ht 5' 5" (1.651 m)   Wt 66.2 kg (146 lb)   SpO2 99%   BMI 24.30 kg/m²     Physical Exam  Vitals reviewed. Constitutional:       Appearance: Normal appearance. HENT:      Head: Normocephalic. Right Ear: Tympanic membrane normal.      Left Ear: Tympanic membrane normal.      Nose: Nose normal. No congestion or rhinorrhea. Mouth/Throat:      Mouth: Mucous membranes are moist.      Pharynx: No posterior oropharyngeal erythema. Cardiovascular:      Rate and Rhythm: Normal rate and regular rhythm. Pulses: Normal pulses. Heart sounds: Normal heart sounds. Pulmonary:      Effort: Pulmonary effort is normal. No respiratory distress. Breath sounds: Normal breath sounds. Abdominal:      General: Bowel sounds are normal.      Palpations: There is no mass. Musculoskeletal:         General: Normal range of motion. Cervical back: Normal range of motion. Skin:     General: Skin is warm and dry. Findings: Erythema and rash present. Neurological:      Mental Status: She is alert and oriented to person, place, and time. Psychiatric:         Mood and Affect: Mood normal.         Behavior: Behavior normal.         Thought Content:  Thought content normal.         Judgment: Judgment normal.       Dameon Peaks, CRNP

## 2023-08-22 ENCOUNTER — TELEPHONE (OUTPATIENT)
Dept: INTERNAL MEDICINE CLINIC | Facility: CLINIC | Age: 68
End: 2023-08-22

## 2023-10-25 ENCOUNTER — OFFICE VISIT (OUTPATIENT)
Age: 68
End: 2023-10-25
Payer: MEDICARE

## 2023-10-25 VITALS
OXYGEN SATURATION: 99 % | WEIGHT: 146 LBS | HEIGHT: 65 IN | BODY MASS INDEX: 24.32 KG/M2 | DIASTOLIC BLOOD PRESSURE: 70 MMHG | HEART RATE: 86 BPM | SYSTOLIC BLOOD PRESSURE: 104 MMHG | RESPIRATION RATE: 18 BRPM | TEMPERATURE: 98.4 F

## 2023-10-25 DIAGNOSIS — M35.00 SJOGREN'S SYNDROME WITHOUT EXTRAGLANDULAR INVOLVEMENT (HCC): ICD-10-CM

## 2023-10-25 DIAGNOSIS — I48.0 PAF (PAROXYSMAL ATRIAL FIBRILLATION) (HCC): ICD-10-CM

## 2023-10-25 DIAGNOSIS — Z00.00 MEDICARE ANNUAL WELLNESS VISIT, SUBSEQUENT: Primary | ICD-10-CM

## 2023-10-25 DIAGNOSIS — F41.1 GENERALIZED ANXIETY DISORDER: ICD-10-CM

## 2023-10-25 DIAGNOSIS — Z12.11 COLON CANCER SCREENING: ICD-10-CM

## 2023-10-25 DIAGNOSIS — E78.5 DYSLIPIDEMIA: ICD-10-CM

## 2023-10-25 DIAGNOSIS — R53.82 CHRONIC FATIGUE: ICD-10-CM

## 2023-10-25 DIAGNOSIS — M85.89 OSTEOPENIA OF MULTIPLE SITES: ICD-10-CM

## 2023-10-25 PROCEDURE — G0438 PPPS, INITIAL VISIT: HCPCS

## 2023-10-25 PROCEDURE — 93000 ELECTROCARDIOGRAM COMPLETE: CPT

## 2023-10-25 PROCEDURE — 99214 OFFICE O/P EST MOD 30 MIN: CPT

## 2023-10-25 NOTE — PROGRESS NOTES
Assessment and Plan:     Problem List Items Addressed This Visit          Cardiovascular and Mediastinum    PAF (paroxysmal atrial fibrillation) ([address])     Pt states she saw cardiology and had work up done, does not have A-FIB. EKG done in the office this visit, and NSR. Similar to EKG from 2021. Stable. Problem resolved and added to history at this time. Relevant Orders    POCT ECG       Musculoskeletal and Integument    Osteopenia of multiple sites  Current with DEXA scan, recent showing osteopenia. Patient states she takes calcium and vitamin D, continue. Other    Dyslipidemia  Viann Patee level borderline high, however improved from last year. Encourage patient to follow a low-fat and low-cholesterol diet. Continue with supplement. Chronic fatigue  Patient complaining of fatigue, with rapid heart rate, and also dry mouth. Patient states just feels so tired states she has felt like this when she was diagnosed with Sjogren's. Patient also states she has been stressed recently as she is caring for her elderly mother. We will check cortisol level and also check CBC for any anemias. Relevant Orders    Cortisol Level,7-9 AM Specimen    CBC and Platelet    Generalized anxiety disorder  Patient states she has been stable lately, has Ativan as needed, states she has not needed to take it daily, only takes it seldom. Sjogren's syndrome without extraglandular involvement ([address])     Pt states she has been feeling better. Follows with rheumatologist, has appointment next week. Other Visit Diagnoses       Medicare annual wellness visit, subsequent    -  Primary    Colon cancer screening      Patient declined colonoscopy, states she would like to do Cologuard at this time, order placed. Relevant Orders    Cologuard              Depression Screening and Follow-up Plan: Patient was screened for depression during today's encounter.  They screened negative with a PHQ-2 score of 0.      Preventive health issues were discussed with patient, and age appropriate screening tests were ordered as noted in patient's After Visit Summary. Personalized health advice and appropriate referrals for health education or preventive services given if needed, as noted in patient's After Visit Summary. History of Present Illness:     Patient presents for a Medicare Wellness Visit    Declines any problems this visit. Patient Care Team:  Pricila Harper as PCP - General (Nurse Practitioner)     Review of Systems:     Review of Systems   Constitutional:  Positive for fatigue. Negative for chills and fever. HENT:  Negative for ear pain and sore throat. Eyes:  Negative for pain and visual disturbance. Respiratory:  Negative for cough and shortness of breath. Cardiovascular:  Negative for chest pain and palpitations. Gastrointestinal:  Negative for abdominal pain and vomiting. Endocrine:        States is cold, eyes dry out   Genitourinary:  Negative for dysuria and hematuria. Musculoskeletal:  Positive for arthralgias. Negative for back pain. Skin:  Negative for color change and rash. Neurological:  Negative for seizures and syncope. All other systems reviewed and are negative.        Problem List:     Patient Active Problem List   Diagnosis   • Dyslipidemia   • Thyroid nodule   • Chronic fatigue   • Solar degeneration   • Pain in joint, lower leg   • Plantar fasciitis   • Peripheral neuropathy   • Lower extremity tendinopathy   • Generalized anxiety disorder   • Degenerative joint disease of left ankle and foot   • Palpitations   • Facial numbness   • Sjogren's syndrome without extraglandular involvement (HCC)   • Venous insufficiency   • Chronic cough   • Dysphonia   • Pharyngoesophageal dysphagia   • Undifferentiated connective tissue disease (HCC)   • Vocal fold atrophy-right   • Sulcus vocalis of vocal fold-right   • Glottic insufficiency   • Muscle tension dysphonia • TMJPDS (temporomandibular joint pain dysfunction syndrome)   • Cervical spine disease   • Cervical vertigo   • Benzodiazepine dependence (HCC)   • Osteopenia of multiple sites   • Small fiber neuropathy      Past Medical and Surgical History:     Past Medical History:   Diagnosis Date   • Atrial fibrillation ([address]) 02/06/2014   • Hyperlipidemia    • PAF (paroxysmal atrial fibrillation) ([address]) 02/21/2023   • Situational anxiety    • Situational insomnia    • Sjogren's syndrome ([address])      History reviewed. No pertinent surgical history.    Family History:     Family History   Problem Relation Age of Onset   • Heart failure Mother    • Hypertension Mother    • Edema Mother         lymphedema   • Celiac disease Father    • Other Father         Pacemaker placement   • Heart disease Father    • Hypertension Father    • Prostate cancer Father    • Dementia Father    • Gout Father    • Heart failure Father    • Celiac disease Daughter    • Allergies Daughter    • Other Family         Congenital heart defect      Social History:     Social History     Socioeconomic History   • Marital status: /Civil Union     Spouse name: None   • Number of children: None   • Years of education: None   • Highest education level: None   Occupational History     Comment: Works for an    Tobacco Use   • Smoking status: Never   • Smokeless tobacco: Never   • Tobacco comments:     Denied:  History of smoking cigarettes   Vaping Use   • Vaping Use: Never used   Substance and Sexual Activity   • Alcohol use: No   • Drug use: No   • Sexual activity: Not Currently   Other Topics Concern   • None   Social History Narrative   • None     Social Determinants of Health     Financial Resource Strain: Low Risk  (10/25/2023)    Overall Financial Resource Strain (CARDIA)    • Difficulty of Paying Living Expenses: Not hard at all   Food Insecurity: Not on file   Transportation Needs: No Transportation Needs (10/25/2023)    PRAPARE - Transportation    • Lack of Transportation (Medical): No    • Lack of Transportation (Non-Medical):  No   Physical Activity: Insufficiently Active (2/22/2021)    Exercise Vital Sign    • Days of Exercise per Week: 2 days    • Minutes of Exercise per Session: 40 min   Stress: Stress Concern Present (2/22/2021)    [facility]    • Feeling of Stress : Rather much   Social Connections: Not on file   Intimate Partner Violence: Not on file   Housing Stability: Not on file      Medications and Allergies:     Current Outpatient Medications   Medication Sig Dispense Refill   • Ascorbic Acid (vitamin C) 1000 MG tablet Take 1,000 mg by mouth daily     • Cequa 0.09 % SOLN PLEASE SEE ATTACHED FOR DETAILED DIRECTIONS     • Cholecalciferol (Vitamin D3) 125 MCG (5000 UT) TABS Take 5,000 Units by mouth daily     • glucosamine-chondroitin 500-400 MG tablet Take 1 tablet by mouth daily     • LORazepam (ATIVAN) 0.5 mg tablet Take 1 tablet (0.5 mg total) by mouth daily as needed for anxiety 30 tablet 0   • MAGNESIUM GLUCONATE PO Take 120 mg by mouth daily     • MELATONIN ER PO Take 0.5 mg by mouth daily     • Multiple Vitamins-Minerals (HAIR SKIN NAILS PO) Take 2 capsules by mouth daily     • QUERCETIN PO Take 500 mg by mouth daily     • SODIUM FLUORIDE, DENTAL GEL, 1.1 % CREA Use on teeth nightly 112 g 3   • TURMERIC CURCUMIN PO Take 1 g by mouth daily     • Zinc 30 MG TABS Take 30 mg by mouth     • zolpidem (AMBIEN) 10 mg tablet Take 1 tablet (10 mg total) by mouth daily at bedtime as needed for sleep 30 tablet 0   • Docosahexaenoic Acid (DHA PO) Take 500 mg by mouth daily (Patient not taking: Reported on 2/21/2023)     • GLUCOSAMINE-CHONDROITIN-MSM PO Take 300 mg by mouth 3 (three) times a day (Patient not taking: Reported on 2/21/2023)     • MANGANESE PO Take 2 mg by mouth (Patient not taking: Reported on 2/21/2023)     • multivitamin (THERAGRAN) TABS Take 1 tablet by mouth daily (Patient not taking: Reported on 2/21/2023)     • NIACIN PO Take 20 mg by mouth (Patient not taking: Reported on 2/21/2023)       No current facility-administered medications for this visit. No Known Allergies   Immunizations:     Immunization History   Administered Date(s) Administered   • COVID-19 MODERNA VACC 0.5 ML IM 02/09/2021, 03/09/2021, 11/04/2021   • INFLUENZA 10/11/2021, 11/12/2021, 11/21/2022   • Influenza Quadrivalent, 6-35 Months IM 1956   • Influenza, recombinant, quadrivalent,injectable, preservative free 11/21/2018, 11/22/2019, 09/30/2020   • Pneumococcal Polysaccharide PPV23 1955   • Tdap 1955, 1955, 05/30/2018, 07/22/2019   • influenza, injectable, quadrivalent 1956      Health Maintenance:         Topic Date Due   • Colorectal Cancer Screening  02/19/2023   • Breast Cancer Screening: Mammogram  12/06/2023   • Hepatitis C Screening  Completed         Topic Date Due   • Pneumococcal Vaccine: 65+ Years (1 - PCV) 08/28/1961   • COVID-19 Vaccine (4 - Moderna series) 12/30/2021      Medicare Screening Tests and Risk Assessments:     Michael Sánchez is here for her Subsequent Wellness visit. Health Risk Assessment:   Patient rates overall health as fair. Patient feels that their physical health rating is same. Patient is satisfied with their life. Eyesight was rated as same. Hearing was rated as same. Patient feels that their emotional and mental health rating is same. Patients states they are never, rarely angry. Patient states they are never, rarely unusually tired/fatigued. Pain experienced in the last 7 days has been none. Patient states that she has experienced no weight loss or gain in last 6 months. Depression Screening:   PHQ-2 Score: 0      Fall Risk Screening: In the past year, patient has experienced: no history of falling in past year      Urinary Incontinence Screening:   Patient has not leaked urine accidently in the last six months.      Home Safety:  Patient does not have trouble with stairs inside or outside of their home. Patient has working smoke alarms and has working carbon monoxide detector. Home safety hazards include: none. Nutrition:   Current diet is Regular. Medications:   Patient is not currently taking any over-the-counter supplements. Patient is able to manage medications. Activities of Daily Living (ADLs)/Instrumental Activities of Daily Living (IADLs):   Walk and transfer into and out of bed and chair?: Yes  Dress and groom yourself?: Yes    Bathe or shower yourself?: Yes    Feed yourself? Yes  Do your laundry/housekeeping?: Yes  Manage your money, pay your bills and track your expenses?: Yes  Make your own meals?: Yes    Do your own shopping?: Yes    Previous Hospitalizations:   Any hospitalizations or ED visits within the last 12 months?: No      Advance Care Planning:   Living will: Yes    Advanced directive: Yes      Cognitive Screening:   Provider or family/friend/caregiver concerned regarding cognition?: No    PREVENTIVE SCREENINGS      Cardiovascular Screening:    General: Screening Not Indicated and History Lipid Disorder      Diabetes Screening:     General: Screening Current      Colorectal Cancer Screening:     General: Screening Current      Breast Cancer Screening:     General: Screening Current      Cervical Cancer Screening:    General: Screening Not Indicated      Abdominal Aortic Aneurysm (AAA) Screening:        General: Screening Not Indicated      Lung Cancer Screening:     General: Screening Not Indicated      Hepatitis C Screening:    General: Screening Current      Preventive Screening Comments: Pt wants to do the cologuard    Screening, Brief Intervention, and Referral to Treatment (SBIRT)    Screening  Typical number of drinks in a day: 0  Typical number of drinks in a week: 0  Interpretation: Low risk drinking behavior.     Single Item Drug Screening:  How often have you used an illegal drug (including marijuana) or a prescription medication for non-medical reasons in the past year? never    Single Item Drug Screen Score: 0  Interpretation: Negative screen for possible drug use disorder    No results found. Physical Exam:     /70 (BP Location: Left arm, Patient Position: Sitting, Cuff Size: Standard)   Pulse 86   Temp 98.4 °F (36.9 °C) (Tympanic)   Resp 18   Ht 5' 5" (1.651 m)   Wt 66.2 kg (146 lb)   SpO2 99%   BMI 24.30 kg/m²     Physical Exam  Vitals and nursing note reviewed. Constitutional:       General: She is not in acute distress. Appearance: She is well-developed. HENT:      Head: Normocephalic and atraumatic. Right Ear: Tympanic membrane normal.      Left Ear: Tympanic membrane normal.      Nose: Nose normal.      Mouth/Throat:      Mouth: Mucous membranes are moist.   Eyes:      Conjunctiva/sclera: Conjunctivae normal.   Cardiovascular:      Rate and Rhythm: Normal rate and regular rhythm. Heart sounds: Normal heart sounds. No murmur heard. Pulmonary:      Effort: Pulmonary effort is normal. No respiratory distress. Breath sounds: Normal breath sounds. Abdominal:      Palpations: Abdomen is soft. Tenderness: There is no abdominal tenderness. Musculoskeletal:         General: No swelling or tenderness. Normal range of motion. Cervical back: Neck supple. Skin:     General: Skin is warm and dry. Capillary Refill: Capillary refill takes less than 2 seconds. Neurological:      Mental Status: She is alert and oriented to person, place, and time. Psychiatric:         Mood and Affect: Mood normal.         Behavior: Behavior normal.         Thought Content:  Thought content normal.          DIANE Fortune

## 2023-10-25 NOTE — ASSESSMENT & PLAN NOTE
Pt states she saw cardiology and had work up done, does not have A-FIB. EKG done in the office this visit, and NSR. Similar to EKG from 2021. Stable.

## 2023-10-25 NOTE — PATIENT INSTRUCTIONS
Medicare Preventive Visit Patient Instructions  Thank you for completing your Welcome to Medicare Visit or Medicare Annual Wellness Visit today. Your next wellness visit will be due in one year (10/25/2024). The screening/preventive services that you may require over the next 5-10 years are detailed below. Some tests may not apply to you based off risk factors and/or age. Screening tests ordered at today's visit but not completed yet may show as past due. Also, please note that scanned in results may not display below. Preventive Screenings:  Service Recommendations Previous Testing/Comments   Colorectal Cancer Screening  * Colonoscopy    * Fecal Occult Blood Test (FOBT)/Fecal Immunochemical Test (FIT)  * Fecal DNA/Cologuard Test  * Flexible Sigmoidoscopy Age: 43-73 years old   Colonoscopy: every 10 years (may be performed more frequently if at higher risk)  OR  FOBT/FIT: every 1 year  OR  Cologuard: every 3 years  OR  Sigmoidoscopy: every 5 years  Screening may be recommended earlier than age 39 if at higher risk for colorectal cancer. Also, an individualized decision between you and your healthcare provider will decide whether screening between the ages of 77-80 would be appropriate. Colonoscopy: 02/19/2020  FOBT/FIT: Not on file  Cologuard: Not on file  Sigmoidoscopy: Not on file    Screening Current     Breast Cancer Screening Age: 36 years old  Frequency: every 1-2 years  Not required if history of left and right mastectomy Mammogram: 12/06/2022    Screening Current   Cervical Cancer Screening Between the ages of 21-29, pap smear recommended once every 3 years. Between the ages of 32-69, can perform pap smear with HPV co-testing every 5 years.    Recommendations may differ for women with a history of total hysterectomy, cervical cancer, or abnormal pap smears in past. Pap Smear: 06/15/2017    Screening Not Indicated   Hepatitis C Screening Once for adults born between 1945 and 1965  More frequently in patients at high risk for Hepatitis C Hep C Antibody: 09/21/2021    Screening Current   Diabetes Screening 1-2 times per year if you're at risk for diabetes or have pre-diabetes Fasting glucose: 92 mg/dL (6/12/2023)  A1C: 5.5 % (4/15/2021)  Screening Current   Cholesterol Screening Once every 5 years if you don't have a lipid disorder. May order more often based on risk factors. Lipid panel: 06/12/2023    Screening Not Indicated  History Lipid Disorder     Other Preventive Screenings Covered by Medicare:  Abdominal Aortic Aneurysm (AAA) Screening: covered once if your at risk. You're considered to be at risk if you have a family history of AAA. Lung Cancer Screening: covers low dose CT scan once per year if you meet all of the following conditions: (1) Age 48-67; (2) No signs or symptoms of lung cancer; (3) Current smoker or have quit smoking within the last 15 years; (4) You have a tobacco smoking history of at least 20 pack years (packs per day multiplied by number of years you smoked); (5) You get a written order from a healthcare provider. Glaucoma Screening: covered annually if you're considered high risk: (1) You have diabetes OR (2) Family history of glaucoma OR (3)  aged 48 and older OR (3)  American aged 72 and older  Osteoporosis Screening: covered every 2 years if you meet one of the following conditions: (1) You're estrogen deficient and at risk for osteoporosis based off medical history and other findings; (2) Have a vertebral abnormality; (3) On glucocorticoid therapy for more than 3 months; (4) Have primary hyperparathyroidism; (5) On osteoporosis medications and need to assess response to drug therapy. Last bone density test (DXA Scan): 03/24/2022. HIV Screening: covered annually if you're between the age of 14-79. Also covered annually if you are younger than 13 and older than 72 with risk factors for HIV infection.  For pregnant patients, it is covered up to 3 times per pregnancy. Immunizations:  Immunization Recommendations   Influenza Vaccine Annual influenza vaccination during flu season is recommended for all persons aged >= 6 months who do not have contraindications   Pneumococcal Vaccine   * Pneumococcal conjugate vaccine = PCV13 (Prevnar 13), PCV15 (Vaxneuvance), PCV20 (Prevnar 20)  * Pneumococcal polysaccharide vaccine = PPSV23 (Pneumovax) Adults 60-13 yo with certain risk factors or if 69+ yo  If never received any pneumonia vaccine: recommend Prevnar 20 (PCV20)  Give PCV20 if previously received 1 dose of PCV13 or PPSV23   Hepatitis B Vaccine 3 dose series if at intermediate or high risk (ex: diabetes, end stage renal disease, liver disease)   Respiratory syncytial virus (RSV) Vaccine - COVERED BY MEDICARE PART D  * RSVPreF3 (Arexvy) CDC recommends that adults 61years of age and older may receive a single dose of RSV vaccine using shared clinical decision-making (SCDM)   Tetanus (Td) Vaccine - COST NOT COVERED BY MEDICARE PART B Following completion of primary series, a booster dose should be given every 10 years to maintain immunity against tetanus. Td may also be given as tetanus wound prophylaxis. Tdap Vaccine - COST NOT COVERED BY MEDICARE PART B Recommended at least once for all adults. For pregnant patients, recommended with each pregnancy. Shingles Vaccine (Shingrix) - COST NOT COVERED BY MEDICARE PART B  2 shot series recommended in those 19 years and older who have or will have weakened immune systems or those 50 years and older     Health Maintenance Due:      Topic Date Due   • Colorectal Cancer Screening  02/19/2023   • Breast Cancer Screening: Mammogram  12/06/2023   • Hepatitis C Screening  Completed     Immunizations Due:      Topic Date Due   • Pneumococcal Vaccine: 65+ Years (1 - PCV) [birth year]28[birth year]1962   • COVID-19 Vaccine (4 - Moderna series) 12/30/2021   • Influenza Vaccine (1) 09/01/2023     Advance Directives   What are advance directives?   Advance directives are legal documents that state your wishes and plans for medical care. These plans are made ahead of time in case you lose your ability to make decisions for yourself. Advance directives can apply to any medical decision, such as the treatments you want, and if you want to donate organs. What are the types of advance directives? There are many types of advance directives, and each state has rules about how to use them. You may choose a combination of any of the following:  Living will: This is a written record of the treatment you want. You can also choose which treatments you do not want, which to limit, and which to stop at a certain time. This includes surgery, medicine, IV fluid, and tube feedings. Durable power of  for [facility]): This is a written record that states who you want to make healthcare choices for you when you are unable to make them for yourself. This person, called a proxy, is usually a family member or a friend. You may choose more than 1 proxy. Do not resuscitate (DNR) order:  A DNR order is used in case your heart stops beating or you stop breathing. It is a request not to have certain forms of treatment, such as CPR. A DNR order may be included in other types of advance directives. Medical directive: This covers the care that you want if you are in a coma, near death, or unable to make decisions for yourself. You can list the treatments you want for each condition. Treatment may include pain medicine, surgery, blood transfusions, dialysis, IV or tube feedings, and a ventilator (breathing machine). Values history: This document has questions about your views, beliefs, and how you feel and think about life. This information can help others choose the care that you would choose. Why are advance directives important? An advance directive helps you control your care. Although spoken wishes may be used, it is better to have your wishes written down.  Spoken wishes can be misunderstood, or not followed. Treatments may be given even if you do not want them. An advance directive may make it easier for your family to make difficult choices about your care. © Copyright [employer] 2018 Information is for End User's use only and may not be sold, redistributed or otherwise used for commercial purposes.  All illustrations and images included in CareNotes® are the copyrighted property of [employer] or [address] [place] [address]

## 2023-10-27 PROBLEM — I48.0 PAF (PAROXYSMAL ATRIAL FIBRILLATION) (HCC): Status: RESOLVED | Noted: 2023-02-21 | Resolved: 2023-10-27

## 2023-10-30 ENCOUNTER — APPOINTMENT (OUTPATIENT)
Dept: LAB | Facility: CLINIC | Age: 68
End: 2023-10-30
Payer: MEDICARE

## 2023-10-30 DIAGNOSIS — R53.82 CHRONIC FATIGUE: ICD-10-CM

## 2023-10-30 LAB
ERYTHROCYTE [DISTWIDTH] IN BLOOD BY AUTOMATED COUNT: 12.8 % (ref 11.6–15.1)
HCT VFR BLD AUTO: 35.8 % (ref 34.8–46.1)
HGB BLD-MCNC: 11.6 G/DL (ref 11.5–15.4)
MCH RBC QN AUTO: 30.4 PG (ref 26.8–34.3)
MCHC RBC AUTO-ENTMCNC: 32.4 G/DL (ref 31.4–37.4)
MCV RBC AUTO: 94 FL (ref 82–98)
PLATELET # BLD AUTO: 251 THOUSANDS/UL (ref 149–390)
PMV BLD AUTO: 10.1 FL (ref 8.9–12.7)
RBC # BLD AUTO: 3.82 MILLION/UL (ref 3.81–5.12)
WBC # BLD AUTO: 4.75 THOUSAND/UL (ref 4.31–10.16)

## 2023-10-30 PROCEDURE — 36415 COLL VENOUS BLD VENIPUNCTURE: CPT

## 2023-10-30 PROCEDURE — 85027 COMPLETE CBC AUTOMATED: CPT

## 2023-10-31 ENCOUNTER — APPOINTMENT (OUTPATIENT)
Dept: LAB | Facility: CLINIC | Age: 68
End: 2023-10-31
Payer: MEDICARE

## 2023-10-31 DIAGNOSIS — R53.82 CHRONIC FATIGUE: ICD-10-CM

## 2023-10-31 LAB — CORTIS AM PEAK SERPL-MCNC: 11.2 UG/DL (ref 6.7–22.6)

## 2023-10-31 PROCEDURE — 36415 COLL VENOUS BLD VENIPUNCTURE: CPT

## 2023-10-31 PROCEDURE — 82533 TOTAL CORTISOL: CPT

## 2023-11-04 PROBLEM — M79.7 FIBROMYALGIA: Status: ACTIVE | Noted: 2023-11-04

## 2023-11-04 PROBLEM — K21.9 LARYNGOPHARYNGEAL REFLUX (LPR): Status: ACTIVE | Noted: 2023-11-04

## 2023-11-04 PROBLEM — D68.61 ANTI-PHOSPHOLIPID ANTIBODY SYNDROME (HCC): Status: ACTIVE | Noted: 2023-11-04

## 2023-11-13 DIAGNOSIS — G47.00 INSOMNIA, UNSPECIFIED TYPE: ICD-10-CM

## 2023-11-14 ENCOUNTER — APPOINTMENT (OUTPATIENT)
Dept: LAB | Facility: CLINIC | Age: 68
End: 2023-11-14
Payer: MEDICARE

## 2023-11-14 DIAGNOSIS — M35.9 UNDIFFERENTIATED CONNECTIVE TISSUE DISEASE (HCC): ICD-10-CM

## 2023-11-14 LAB
ALBUMIN SERPL BCP-MCNC: 4.1 G/DL (ref 3.5–5)
ALP SERPL-CCNC: 64 U/L (ref 34–104)
ALT SERPL W P-5'-P-CCNC: 15 U/L (ref 7–52)
ANION GAP SERPL CALCULATED.3IONS-SCNC: 7 MMOL/L
AST SERPL W P-5'-P-CCNC: 22 U/L (ref 13–39)
BASOPHILS # BLD AUTO: 0.07 THOUSANDS/ÂΜL (ref 0–0.1)
BASOPHILS NFR BLD AUTO: 2 % (ref 0–1)
BILIRUB SERPL-MCNC: 0.66 MG/DL (ref 0.2–1)
BUN SERPL-MCNC: 12 MG/DL (ref 5–25)
C3 SERPL-MCNC: 121 MG/DL (ref 87–200)
C4 SERPL-MCNC: 26 MG/DL (ref 19–52)
CALCIUM SERPL-MCNC: 9.6 MG/DL (ref 8.4–10.2)
CHLORIDE SERPL-SCNC: 101 MMOL/L (ref 96–108)
CO2 SERPL-SCNC: 29 MMOL/L (ref 21–32)
CREAT SERPL-MCNC: 0.7 MG/DL (ref 0.6–1.3)
CRP SERPL QL: 1.2 MG/L
EOSINOPHIL # BLD AUTO: 0.15 THOUSAND/ÂΜL (ref 0–0.61)
EOSINOPHIL NFR BLD AUTO: 3 % (ref 0–6)
ERYTHROCYTE [DISTWIDTH] IN BLOOD BY AUTOMATED COUNT: 12.5 % (ref 11.6–15.1)
GFR SERPL CREATININE-BSD FRML MDRD: 89 ML/MIN/1.73SQ M
GLUCOSE P FAST SERPL-MCNC: 85 MG/DL (ref 65–99)
HCT VFR BLD AUTO: 37.3 % (ref 34.8–46.1)
HGB BLD-MCNC: 11.9 G/DL (ref 11.5–15.4)
IMM GRANULOCYTES # BLD AUTO: 0 THOUSAND/UL (ref 0–0.2)
IMM GRANULOCYTES NFR BLD AUTO: 0 % (ref 0–2)
LYMPHOCYTES # BLD AUTO: 1.86 THOUSANDS/ÂΜL (ref 0.6–4.47)
LYMPHOCYTES NFR BLD AUTO: 41 % (ref 14–44)
MCH RBC QN AUTO: 30 PG (ref 26.8–34.3)
MCHC RBC AUTO-ENTMCNC: 31.9 G/DL (ref 31.4–37.4)
MCV RBC AUTO: 94 FL (ref 82–98)
MONOCYTES # BLD AUTO: 0.38 THOUSAND/ÂΜL (ref 0.17–1.22)
MONOCYTES NFR BLD AUTO: 8 % (ref 4–12)
NEUTROPHILS # BLD AUTO: 2.11 THOUSANDS/ÂΜL (ref 1.85–7.62)
NEUTS SEG NFR BLD AUTO: 46 % (ref 43–75)
NRBC BLD AUTO-RTO: 0 /100 WBCS
PLATELET # BLD AUTO: 250 THOUSANDS/UL (ref 149–390)
PMV BLD AUTO: 10 FL (ref 8.9–12.7)
POTASSIUM SERPL-SCNC: 4.7 MMOL/L (ref 3.5–5.3)
PROT SERPL-MCNC: 6.3 G/DL (ref 6.4–8.4)
RBC # BLD AUTO: 3.97 MILLION/UL (ref 3.81–5.12)
SODIUM SERPL-SCNC: 137 MMOL/L (ref 135–147)
WBC # BLD AUTO: 4.57 THOUSAND/UL (ref 4.31–10.16)

## 2023-11-14 PROCEDURE — 80053 COMPREHEN METABOLIC PANEL: CPT

## 2023-11-14 PROCEDURE — 86160 COMPLEMENT ANTIGEN: CPT

## 2023-11-14 PROCEDURE — 86225 DNA ANTIBODY NATIVE: CPT

## 2023-11-14 PROCEDURE — 36415 COLL VENOUS BLD VENIPUNCTURE: CPT

## 2023-11-14 PROCEDURE — 85025 COMPLETE CBC W/AUTO DIFF WBC: CPT

## 2023-11-14 PROCEDURE — 86140 C-REACTIVE PROTEIN: CPT

## 2023-11-14 RX ORDER — ZOLPIDEM TARTRATE 10 MG/1
10 TABLET ORAL
Qty: 30 TABLET | Refills: 0 | Status: SHIPPED | OUTPATIENT
Start: 2023-11-14

## 2023-11-16 LAB — DSDNA AB SER QL CLIF: NEGATIVE

## 2023-11-20 DIAGNOSIS — F41.9 ANXIETY: ICD-10-CM

## 2023-11-20 RX ORDER — LORAZEPAM 0.5 MG/1
0.5 TABLET ORAL DAILY PRN
Qty: 30 TABLET | Refills: 0 | Status: SHIPPED | OUTPATIENT
Start: 2023-11-20

## 2023-11-30 NOTE — PROGRESS NOTES
Ag    NAME: Linh Dejesus  AGE: 60 y o  SEX: female  : 1955     DATE: 2/3/2021     Assessment and Plan:     1  Facial numbness  2  Chronic fatigue  3  Multiple sclerosis ([address] ) - rule out    Differential of facial numbness discussed with patient  Low suspicion for TIA/stroke based off distribution of her symptoms  Did discuss possibility of embolic lacunar infarction  She has a possible remote history of Afib, but she cannot provide much details concerning this  She does have paresthesias in her feet along with chronic fatigue going on before this new onset facial numbness  With the constellation of symptoms, she should be investigated for multiple sclerosis  MRI brain MS protocol recommended  - MRI brain MS wo and w contrast; Future  - CBC; Future  - TSH, 3rd generation; Future    4  Mixed hyperlipidemia    Check UTD lipid panel  Follow heart healthy diet  - Lipid panel; Future    BMI Counseling: BMI was not able to be calculated due to patient refusing height and/or weight  Return in about 6 months (around 8/3/2021) for Follow-up  Chief Complaint:     Chief Complaint   Patient presents with    Follow-up    Numbness     across face, mouth, lips, tip of tounge, and tip of nose    Dental Pain     "metal taste"    Earache     (R) ear        History of Present Illness:     Former patient of Dr Carisa Tian  For about past month, has had problems with facial numbness  Saw dentist due to dental pain and needs a root canal, but doesn't feel it would explain her symptoms  Numbness has been constant across lower jaw extending to both ears, tip of nose, lips, tongue  Vitamin B12 level was normal  Ears felt clogged during this period as well and was giving amoxicillin without any benefit  Denies migraines  No history of stroke/TIA  Has chronic fatigue  No pain associated with the numbness  No problems with speech or swallowing   Also has pins and needles of her feet which has been going on for years and was told she has neuropathy  Does have some anxiety more so regarding her parents and worrying about their health  Most recent TSH was normal  No recent abnormalities on CMP  Vitamin D was just mildly low  Review of Systems:     Review of Systems   Constitutional: Positive for fatigue (chronic)  Negative for activity change and appetite change  HENT: Positive for ear pain  Respiratory: Negative for apnea, cough, chest tightness, shortness of breath and wheezing  Cardiovascular: Negative for chest pain, palpitations and leg swelling  Gastrointestinal: Negative for abdominal distention, abdominal pain, blood in stool, constipation, diarrhea, nausea and vomiting  Neurological: Positive for numbness (facial)  Negative for dizziness, weakness, light-headedness and headaches  Psychiatric/Behavioral: Negative for behavioral problems, confusion, hallucinations, sleep disturbance and suicidal ideas  The patient is nervous/anxious  Objective:     /72 (BP Location: Left arm, Patient Position: Sitting, Cuff Size: Standard)   Pulse 88   Temp 98 °F (36 7 °C) (Temporal) Comment: NO NSAIDS  SpO2 100%     Physical Exam  Vitals signs reviewed  Constitutional:       General: She is not in acute distress  Appearance: She is well-developed  She is not diaphoretic  Eyes:      General:         Right eye: No discharge  Neck:      Musculoskeletal: Neck supple  Thyroid: No thyromegaly  Vascular: No JVD  Cardiovascular:      Rate and Rhythm: Normal rate and regular rhythm  Heart sounds: Normal heart sounds  No murmur  Pulmonary:      Effort: Pulmonary effort is normal  No respiratory distress  Breath sounds: Normal breath sounds  No wheezing or rales  Abdominal:      General: Bowel sounds are normal  There is no distension  Palpations: Abdomen is soft  Tenderness:  There is no abdominal tenderness  Musculoskeletal:      Right lower leg: No edema  Left lower leg: No edema  Lymphadenopathy:      Cervical: No cervical adenopathy  Neurological:      Mental Status: She is alert  Mental status is at baseline  Cranial Nerves: No cranial nerve deficit  Sensory: No sensory deficit  Motor: No weakness        Coordination: Coordination normal       Gait: Gait normal    Psychiatric:         Mood and Affect: Mood normal          Behavior: Behavior normal        Gi Lee DO  MEDICAL [id number] Medical decision making

## 2024-02-14 ENCOUNTER — TELEPHONE (OUTPATIENT)
Age: 69
End: 2024-02-14

## 2024-02-16 ENCOUNTER — TELEPHONE (OUTPATIENT)
Age: 69
End: 2024-02-16

## 2024-02-16 NOTE — TELEPHONE ENCOUNTER
Patient called, she had completed the blood work back on 11/14/23. She never heard back from  about the results if there was anything alarming that needed to be addressed. Please advise

## 2024-02-28 ENCOUNTER — TELEPHONE (OUTPATIENT)
Age: 69
End: 2024-02-28

## 2024-03-25 DIAGNOSIS — G47.00 INSOMNIA, UNSPECIFIED TYPE: ICD-10-CM

## 2024-03-25 DIAGNOSIS — F41.9 ANXIETY: ICD-10-CM

## 2024-03-27 RX ORDER — LORAZEPAM 0.5 MG/1
0.5 TABLET ORAL DAILY PRN
Qty: 30 TABLET | Refills: 0 | Status: SHIPPED | OUTPATIENT
Start: 2024-03-27

## 2024-03-27 RX ORDER — ZOLPIDEM TARTRATE 10 MG/1
10 TABLET ORAL
Qty: 30 TABLET | Refills: 0 | Status: SHIPPED | OUTPATIENT
Start: 2024-03-27

## 2024-04-17 NOTE — TELEPHONE ENCOUNTER
Cut herself on a sharp knob on her leg  The cut is healing fine but she is wondering if she should get a tetanus shot  2

## 2024-05-01 NOTE — PROGRESS NOTES
Cardiology Follow-up    Martine Meza  [id number]  1955  [facility]  [address]  [place] [address]      1. Dyslipidemia        2. Encounter for screening colonoscopy  Ambulatory referral for colon cancer education            Discussion/Summary:  Ms. Meza is a pleasant 68-year-old female who presents to the office today for the re-evaluation of palpitations.     After her last visit she did undergo a Zio patch.  She did have symptoms of palpitations which correlated with normal sinus rhythm.  We also discussed purchasing a Zio patch to monitor her rhythm at home should symptoms should occur.  She will look into purchasing a device.    Otherwise her blood pressure is well-controlled in the office today.  She is on no antihypertensive medication.  A low-salt diet was reinforced.    Her most recent lipids reveal a slightly suboptimal LDL at 103.  Based on current guidelines no therapy is indicated and lifestyle modifications were discussed and recommended.  She also underwent a calcium score last year which was 1 placing her in the 50th percentile for age, sex and race.  She also underwent a LP(a) level which was elevated at 106.  There is limited data suggesting that reducing LP(a) levels significantly reduce his atherosclerotic risk.  Hence no therapy is recommended.    I will see her back in the office in one year or sooner if deemed necessary.    History of Present Illness:  Ms. Meza is a very pleasant 68-year-old female who presents to the office today for routine follow-up.     She continues with intermittent palpitations.  She states that she can have palpitations on a nightly basis described as a rapid heartbeat.  She wakes from sleep at night because of these.  If she gets up and walks around her symptoms will gradually resolve within about 10 minutes.  They can happen for a month in a row and  then there are times where she does not have symptoms for a bit of time.  The palpitations are not associated with any other symptoms.   She thinks they may be secondary to anxiety as she continues to care for her elderly mother.    She is active.  She walks for exercise a few days per week.  In doing so she feels well.  She denies any exertional chest pain or shortness of breath.  She denies any signs or symptoms of congestive heart failure including lower extremity edema, paroxysmal nocturnal dyspnea, orthopnea, acute weight gain or increasing abdominal girth.  She denies lightheadedness, syncope or presyncope.  She denies symptoms of claudication.     Patient Active Problem List   Diagnosis    Dyslipidemia    Thyroid nodule    Chronic fatigue    Solar degeneration    Pain in joint, lower leg    Plantar fasciitis    Peripheral neuropathy    Lower extremity tendinopathy    Generalized anxiety disorder    Degenerative joint disease of left ankle and foot    Palpitations    Facial numbness    Sjogren's syndrome without extraglandular involvement ([facility])    Venous insufficiency    Chronic cough    Dysphonia    Pharyngoesophageal dysphagia    Undifferentiated connective tissue disease ([facility])    Vocal fold atrophy-right    Sulcus vocalis of vocal fold-right    Glottic insufficiency    Muscle tension dysphonia    TMJPDS (temporomandibular joint pain dysfunction syndrome)    Cervical spine disease    Cervical vertigo    Benzodiazepine dependence (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)    Laryngopharyngeal reflux (LPR)     Past Medical History:   Diagnosis Date    Atrial fibrillation ([facility]) 02/06/2014    Hyperlipidemia     PAF (paroxysmal atrial fibrillation) ([facility]) 02/21/2023    Situational anxiety     Situational insomnia     Sjogren's syndrome ([facility])      Social History     Socioeconomic History    Marital status: /Civil Union      Spouse name: Not on file    Number of children: Not on file    Years of education: Not on file    Highest education level: Not on file   Occupational History     Comment: Works for an    Tobacco Use    Smoking status: Never    Smokeless tobacco: Never    Tobacco comments:     Denied:  History of smoking cigarettes   Vaping Use    Vaping status: Never Used   Substance and Sexual Activity    Alcohol use: No    Drug use: No    Sexual activity: Not Currently   Other Topics Concern    Not on file   Social History Narrative    Not on file     Social Determinants of Health     Financial Resource Strain: Low Risk  (10/25/2023)    Overall Financial Resource Strain (CARDIA)     Difficulty of Paying Living Expenses: Not hard at all   Food Insecurity: Not on file   Transportation Needs: No Transportation Needs (10/25/2023)    PRAPARE - Transportation     Lack of Transportation (Medical): No     Lack of Transportation (Non-Medical): No   Physical Activity: Insufficiently Active (2/22/2021)    Exercise Vital Sign     Days of Exercise per Week: 2 days     Minutes of Exercise per Session: 40 min   Stress: Stress Concern Present (2/22/2021)    [employer] - Occupational Stress Questionnaire     Feeling of Stress : Rather much   Social Connections: Not on file   Intimate Partner Violence: Not on file   Housing Stability: Not on file      Family History   Problem Relation Age of Onset    Heart failure Mother     Hypertension Mother     Edema Mother         lymphedema    Celiac disease Father     Other Father         Pacemaker placement    Heart disease Father     Hypertension Father     Prostate cancer Father     Dementia Father     Gout Father     Heart failure Father     Celiac disease Daughter     Allergies Daughter     Other Family         Congenital heart defect     No past surgical history on file.    Current Outpatient Medications:     Ascorbic Acid (vitamin C) 1000 MG tablet, Take  1,000 mg by mouth daily, Disp: , Rfl:     azelastine (ASTELIN) 0.1 % nasal spray, 1 spray into each nostril 2 (two) times a day Use in each nostril as directed, Disp: 1 mL, Rfl: 2    Cequa 0.09 % SOLN, PLEASE SEE ATTACHED FOR DETAILED DIRECTIONS, Disp: , Rfl:     Cholecalciferol (Vitamin D3) 125 MCG (5000 UT) TABS, Take 5,000 Units by mouth daily, Disp: , Rfl:     glucosamine-chondroitin 500-400 MG tablet, Take 1 tablet by mouth daily, Disp: , Rfl:     LORazepam (ATIVAN) 0.5 mg tablet, Take 1 tablet (0.5 mg total) by mouth daily as needed for anxiety, Disp: 30 tablet, Rfl: 0    MAGNESIUM GLUCONATE PO, Take 120 mg by mouth daily, Disp: , Rfl:     MELATONIN ER PO, Take 0.5 mg by mouth daily, Disp: , Rfl:     Multiple Vitamins-Minerals (HAIR SKIN NAILS PO), Take 2 capsules by mouth daily, Disp: , Rfl:     QUERCETIN PO, Take 500 mg by mouth daily, Disp: , Rfl:     SODIUM FLUORIDE, DENTAL GEL, (SF 5000 Plus) 1.1 % CREA, USE ON TEETH NIGHTLY, Disp: 102 g, Rfl: 2    TURMERIC CURCUMIN PO, Take 1 g by mouth daily, Disp: , Rfl:     Zinc 30 MG TABS, Take 30 mg by mouth, Disp: , Rfl:     Docosahexaenoic Acid (DHA PO), Take 500 mg by mouth daily (Patient not taking: Reported on 2/21/2023), Disp: , Rfl:     GLUCOSAMINE-CHONDROITIN-MSM PO, Take 300 mg by mouth 3 (three) times a day (Patient not taking: Reported on 2/21/2023), Disp: , Rfl:     MANGANESE PO, Take 2 mg by mouth (Patient not taking: Reported on 2/21/2023), Disp: , Rfl:     multivitamin (THERAGRAN) TABS, Take 1 tablet by mouth daily (Patient not taking: Reported on 2/21/2023), Disp: , Rfl:     NIACIN PO, Take 20 mg by mouth (Patient not taking: Reported on 2/21/2023), Disp: , Rfl:     zolpidem (AMBIEN) 10 mg tablet, Take 1 tablet (10 mg total) by mouth daily at bedtime as needed for sleep (Patient not taking: Reported on 5/2/2024), Disp: 30 tablet, Rfl: 0  No Known Allergies        Imaging: Echo complete w/ contrast if indicated    Result Date: 2/9/2023  Narrative:    "Left Ventricle: Left ventricular cavity size is normal. Wall thickness is normal. The left ventricular ejection fraction is 60%. Systolic function is normal. Wall motion is normal. Diastolic function is mildly abnormal, consistent with grade I (abnormal) relaxation.   Aortic Valve: There is mild regurgitation.   Tricuspid Valve: There is mild regurgitation.     Review of Systems:  Review of Systems   Respiratory:  Negative for choking, chest tightness and shortness of breath.    Cardiovascular:  Positive for palpitations. Negative for chest pain and leg swelling.   All other systems reviewed and are negative.        Vitals:    05/02/24 1536   BP: 108/50   BP Location: Left arm   Patient Position: Sitting   Cuff Size: Standard   Pulse: 91   SpO2: 96%   Weight: 66.7 kg (147 lb)   Height: 5' 5\" (1.651 m)       Vitals:    05/02/24 1536   Weight: 66.7 kg (147 lb)       Height: 5' 5\" (165.1 cm)     Physical Exam:  General appearance:  Appears stated age, alert, well appearing and in no distress  HEENT:  PERRLA, EOMI, no scleral icterus, no conjunctival pallor  NECK:  Supple, No elevated JVP, no thyromegaly, no carotid bruits  HEART:  Regular rate and rhythm, normal S1/S2, no S3/S4, no murmur or rub  LUNGS:  Clear to auscultation bilaterally  ABDOMEN:  Soft, non-tender, positive bowel sounds, no rebound or guarding, no organomegaly   EXTREMITIES:  No edema  VASCULAR:  Normal pedal pulses   SKIN: No lesions or rashes on exposed skin  NEURO:  CN II-XII intact, no focal deficits  "

## 2024-05-02 ENCOUNTER — OFFICE VISIT (OUTPATIENT)
Dept: CARDIOLOGY CLINIC | Facility: CLINIC | Age: 69
End: 2024-05-02
Payer: MEDICARE

## 2024-05-02 VITALS
HEART RATE: 91 BPM | HEIGHT: 65 IN | OXYGEN SATURATION: 96 % | BODY MASS INDEX: 24.49 KG/M2 | SYSTOLIC BLOOD PRESSURE: 108 MMHG | WEIGHT: 147 LBS | DIASTOLIC BLOOD PRESSURE: 50 MMHG

## 2024-05-02 DIAGNOSIS — Z12.11 ENCOUNTER FOR SCREENING COLONOSCOPY: ICD-10-CM

## 2024-05-02 DIAGNOSIS — E78.5 DYSLIPIDEMIA: Primary | ICD-10-CM

## 2024-05-02 PROCEDURE — 99214 OFFICE O/P EST MOD 30 MIN: CPT | Performed by: INTERNAL MEDICINE

## 2024-05-09 ENCOUNTER — APPOINTMENT (OUTPATIENT)
Dept: RADIOLOGY | Facility: CLINIC | Age: 69
End: 2024-05-09
Payer: MEDICARE

## 2024-05-09 DIAGNOSIS — R05.3 CHRONIC COUGH: ICD-10-CM

## 2024-05-09 PROCEDURE — 71046 X-RAY EXAM CHEST 2 VIEWS: CPT

## 2024-05-14 DIAGNOSIS — F41.9 ANXIETY: ICD-10-CM

## 2024-05-14 NOTE — TELEPHONE ENCOUNTER
Medication: LORazepam (ATIVAN)     Dose/Frequency: Take 1 tablet (0.5 mg total) by mouth daily as needed for anxiety     Quantity: 30 tablet    Pharmacy: [facility] #[id number] - [place] - [address]      Office:   [x] PCP/Provider -   [] Speciality/Provider -     Does the patient have enough for 3 days?   [] Yes   [x] No - Send as HP to POD

## 2024-05-15 ENCOUNTER — OFFICE VISIT (OUTPATIENT)
Age: 69
End: 2024-05-15
Payer: MEDICARE

## 2024-05-15 VITALS
BODY MASS INDEX: 25.43 KG/M2 | OXYGEN SATURATION: 99 % | DIASTOLIC BLOOD PRESSURE: 70 MMHG | HEIGHT: 65 IN | WEIGHT: 152.6 LBS | SYSTOLIC BLOOD PRESSURE: 116 MMHG | HEART RATE: 72 BPM | TEMPERATURE: 97.3 F

## 2024-05-15 DIAGNOSIS — M79.7 FIBROMYALGIA: ICD-10-CM

## 2024-05-15 DIAGNOSIS — M35.9 UNDIFFERENTIATED CONNECTIVE TISSUE DISEASE (HCC): ICD-10-CM

## 2024-05-15 DIAGNOSIS — D68.61 ANTI-PHOSPHOLIPID ANTIBODY SYNDROME (HCC): ICD-10-CM

## 2024-05-15 DIAGNOSIS — G62.9 SMALL FIBER NEUROPATHY: ICD-10-CM

## 2024-05-15 DIAGNOSIS — M85.89 OSTEOPENIA OF MULTIPLE SITES: ICD-10-CM

## 2024-05-15 DIAGNOSIS — K21.9 LARYNGOPHARYNGEAL REFLUX (LPR): ICD-10-CM

## 2024-05-15 DIAGNOSIS — F41.1 GENERALIZED ANXIETY DISORDER: ICD-10-CM

## 2024-05-15 DIAGNOSIS — M35.00 SJOGREN'S SYNDROME WITHOUT EXTRAGLANDULAR INVOLVEMENT (HCC): Primary | ICD-10-CM

## 2024-05-15 PROCEDURE — 99214 OFFICE O/P EST MOD 30 MIN: CPT | Performed by: INTERNAL MEDICINE

## 2024-05-15 RX ORDER — LORAZEPAM 0.5 MG/1
0.5 TABLET ORAL DAILY PRN
Qty: 30 TABLET | Refills: 0 | Status: SHIPPED | OUTPATIENT
Start: 2024-05-15

## 2024-05-15 NOTE — PROGRESS NOTES
Assessment/Plan:    Undifferentiated connective tissue disease (HCC)  History positive VANESSA with lupus Avise with low probability for lupus.  Weakly positive IgG cardiolipin antibody with positive beta-2 glycoprotein 1 IgM positive.  Question of paroxysmal atrial fibrillation in the past not confirmed, felt to be more likely atrial tachycardia.  No history of DVT or pulmonary embolism.  Lupus anticoagulant was negative.  No evidence for phospholipid antibody syndrome.  Dry eyes treated with Cequa and followed by optometrist who specializes in dry eye syndrome.  Encourage xylitol mints and sodium fluoride dental paste for dry mouth as well as vigilant dental hygiene and follow-up with dentist every 3 months or sooner if needed.  Monitor disease activity and medication toxicity with lab work as ordered.  Follow-up 6 months or sooner if needed.    Sjogren's syndrome without extraglandular involvement (HCC)  Continue Cequa eyedrops and follow up with optometrist as scheduled.  Continue Prevident toothpaste.  Suggest xylitol mints for dry mouth.  Follow-up with dentist every 3 months or sooner if needed.  Continue vigilant dental hygiene.  Continue to monitor.    Fibromyalgia  Fibromyalgia with associated triggers, sleep disturbance, chronic fatigue, brain fog.  Generally notes improvement with the anti-inflammatory diet, however, stress exacerbates her symptoms.  She has had 2 flares since the last office visit with associated pruritus without hives, burning throat, increasing joint pain.  Symptoms have abated.  She is caring for her elderly mother with almost daily home visits, which can exacerbate her stress level.  She has been in cognitive behavioral therapy since June with benefit.  Encourage home exercise program as tolerated.  Continue to monitor.    Generalized anxiety disorder  Patient in CBT since last June with benefit.  Continue CBT as scheduled in view of potential benefit long-term for  anxiety.    Anti-phospholipid antibody syndrome (HCC)  Lupus Avise August 2022 with negative index of -0.4 with low likelihood of lupus.  Beta-2 glycoprotein 1 IgM positive with weekly positive IgG cardiolipin antibody.  Lupus anticoagulant negative.  No history of DVT or pulmonary embolus.  She is unlikely to have phospholipid antibody syndrome with no history of DVT or pulmonary embolism.  Continue to monitor clinically and with lab work as ordered.    Osteopenia of multiple sites  Osteopenia with low FRAX risk on DEXA March 2022.  Continue calcium and vitamin D supplement.  Encourage home exercise program as tolerated.  Monitor DEXA every 2 years.  She is to schedule follow-up DEXA to monitor.    Small fiber neuropathy  Facial paresthesias and numbness exacerbated with stress.  Would consider diagnostically small fiber neuropathy.  No evidence for large fiber neuropathy.  Consider neurology evaluation if symptoms become more prominent.  Continue to monitor.    Laryngopharyngeal reflux (LPR)  Follow-up ENT.         Problem List Items Addressed This Visit       Generalized anxiety disorder     Patient in CBT since last June with benefit.  Continue CBT as scheduled in view of potential benefit long-term for anxiety.         Sjogren's syndrome without extraglandular involvement (HCC) - Primary     Continue Cequa eyedrops and follow up with optometrist as scheduled.  Continue Prevident toothpaste.  Suggest xylitol mints for dry mouth.  Follow-up with dentist every 3 months or sooner if needed.  Continue vigilant dental hygiene.  Continue to monitor.         Undifferentiated connective tissue disease (HCC)     History positive VANESSA with lupus Avise with low probability for lupus.  Weakly positive IgG cardiolipin antibody with positive beta-2 glycoprotein 1 IgM positive.  Question of paroxysmal atrial fibrillation in the past not confirmed, felt to be more likely atrial tachycardia.  No history of DVT or pulmonary  embolism.  Lupus anticoagulant was negative.  No evidence for phospholipid antibody syndrome.  Dry eyes treated with Cequa and followed by optometrist who specializes in dry eye syndrome.  Encourage xylitol mints and sodium fluoride dental paste for dry mouth as well as vigilant dental hygiene and follow-up with dentist every 3 months or sooner if needed.  Monitor disease activity and medication toxicity with lab work as ordered.  Follow-up 6 months or sooner if needed.         Osteopenia of multiple sites     Osteopenia with low FRAX risk on DEXA March 2022.  Continue calcium and vitamin D supplement.  Encourage home exercise program as tolerated.  Monitor DEXA every 2 years.  She is to schedule follow-up DEXA to monitor.         Small fiber neuropathy     Facial paresthesias and numbness exacerbated with stress.  Would consider diagnostically small fiber neuropathy.  No evidence for large fiber neuropathy.  Consider neurology evaluation if symptoms become more prominent.  Continue to monitor.         Fibromyalgia     Fibromyalgia with associated triggers, sleep disturbance, chronic fatigue, brain fog.  Generally notes improvement with the anti-inflammatory diet, however, stress exacerbates her symptoms.  She has had 2 flares since the last office visit with associated pruritus without hives, burning throat, increasing joint pain.  Symptoms have abated.  She is caring for her elderly mother with almost daily home visits, which can exacerbate her stress level.  She has been in cognitive behavioral therapy since June with benefit.  Encourage home exercise program as tolerated.  Continue to monitor.         Anti-phospholipid antibody syndrome (HCC)     Lupus Avise August 2022 with negative index of -0.4 with low likelihood of lupus.  Beta-2 glycoprotein 1 IgM positive with weekly positive IgG cardiolipin antibody.  Lupus anticoagulant negative.  No history of DVT or pulmonary embolus.  She is unlikely to have  phospholipid antibody syndrome with no history of DVT or pulmonary embolism.  Continue to monitor clinically and with lab work as ordered.         Laryngopharyngeal reflux (LPR)     Follow-up ENT.                Reviewed records, labs, and imaging with the patient in detail.  Counseled patient.  Discussion regarding my findings and recommendations.  Office visit with documentation 35 min.    Subjective:      Patient ID: Martine Meza is a 68 y.o. female.    Patient whose history began approximately several years ago at which time she developed a tooth problem which ultimately led to a root canal.  Subsequent to that she noted numbness in her lips, then her tongue with associated tingling, which would come and go, then symptoms in the tip of her nose and the lower part of her face, in addition to a metallic taste in her mouth.  She also noted numbness and tingling in her right hand, particularly thumb and the index finger, as well as toes initially right and subsequently left.  She has had hair thinning.  She has had dry fingernails with  at the nailbed.  She has had ongoing dry eyes in addition to dry mouth.  She was seen by the dentist who thought that she likely had Sjogren's.  She has also followed with Ophthalmology and the optometrist who felt she had Sjogren's.  She was seen by a neurologist in [facility], and an MRI dated 02/21/2021 was unremarkable with no evidence for MS.  Neurology ultimately felt that she had small fiber neuropathy.  Patient has had symptoms of feeling flushed as well as sweating.  She has had joint pain in her knees, hips, and shoulders with no prolonged am gelation.  She does note low back pain with occasional radicular symptoms to her feet.  She has had canker sores in her mouth which have been painful.  She notes hair thinning.  She has had some chest pain in the midsternal area with no associated shortness of breath or diaphoresis.  She has been using drops for  dry eyes.  She did have punctal plugs.  Schirmer's test was positive for dry eyes.  She has a history of migraine headaches which have been quiescent but does have muscle contraction headaches.  She has had some memory issues with brain fog.  She complained of balance issues.  She has history of sleep disturbance with chronic fatigue.  She also noted weight gain.  She has had chronic dry cough and has a sensation that things get stuck in her throat.  She notes food sensitivities with onions, garlic, and peppers.  Patient did have an episode of questionable atrial fibrillation at age 58 with no recurrence. Review of the cardiac consultation was significant for negative EKG and stress echo in 2016 which was negative for ischemia.  She was noted to have akinetic inferior segment at baseline. Event monitor in 2016 was significant for occasional PACs with normal sinus rhythm.  She had no abnormality in her wall motion on the echocardiogram and she had normal ejection fraction.  There was a question of an old MI, but the cardiologist felt that in view of normal wall motion and normal ejection fraction, subsequent studies were suggested to further evaluate.  Most recent echocardiogram dated 2/9/2023 significant for left ventricular ejection fraction 60% with grade 1 diastolic dysfunction and mild valvular disease with mild regurgitation aortic valve and tricuspid valve.  CT calcium score dated 3/7/2023 measured at 1.  She has a history of urticaria with no recurrence.    Since the initial office visit August 2022, she has been maintaining an anti-inflammatory diet and states that she has noted overall improvement.  She has lost more than 40 pounds total.  She continues to have significant stress due to caring for her elderly mother.  She does have nonradicular low back pain.  She has chronic dry eye syndrome and has been following with optometrist Dr. Stephens who started her on Cequa with reasonable benefit.  Mouth sores  less frequent.  Dry mouth better with Biotene with dental visits without recent decay.  She does note dryness in her nails.  She feels that her sleep, fatigue, and brain fog continue to be problematic.  She has no headache.  She describes intermittent buzzing in her face or numbness which she believes is associated with stress.  She has been dealing with elderly parents and with the passing of her father, she has been caring for her grieving mother. She is going over to give care to her mother on an almost daily basis. She continues CBT with benefit.  She has been trying to exercise daily with walking.  She states that palpitations come and go based on her anxiety level.  She has had 2 generalized flares with associated joint pain since her last office visit.  She does have history of pruritus and burning throat with these flares.  Overall she does feel that she has improved.    More recent lab work significant for .  Lipoprotein a 105.9.  Apo B normal.  Ferritin 28.  Iron 121.  Albumin 4.1.  TSH 3.45.  Hemoglobin A1c 5.5.  Fasting insulin low at 7.4.  Vitamin D 59.2.  Review of lab work dated 10/30/2023 significant for a blood cell count 4.75.  Hemoglobin/hematocrit 11.6/35.8 respectively.  Platelets 251.  Review of lab work dated 6/12/2023 significant for white blood cell count 4.93 with absolute neutrophil count 2.47.  Platelets 269.  Calcium 9.1.  Estimated GFR 83.  C3-C4 complements normal.  Double-stranded DNA antibodies negative.  TSH 3.491.  Vitamin D 64.7.  CRP less than 3.0.  Urinalysis benign.  Review of lab work dated 8/4/2022 significant for estimated GFR 97.  Double-stranded DNA antibodies negative.  Hemoglobin/hematocrit 11.5/34.9 respectively.  White blood cell count 4.58 with absolute neutrophil count 2.65.  CRP 1.2.  C3 and C4 complements negative. Urinalysis benign.  Lupus avise connective tissue disease diagnostic with negative index of -0.4.  VANESSA positive by IgG.  Beta-2 glycoprotein 1  IgM positive.  Thyroid antibody negative.  Weakly positive IgG cardiolipin antibody.  Lab work dated 8/18/2022 significant for lupus anticoagulant negative.  Ferritin 30.  Iron 59.   Review of lab work from June 2022 significant for salivary protein 1 IgG +43.9.  IgM and IgA negative.  Carbonic anhydrase VI IgG positive at 24.7.  IgA and IgM negative.  Parotid specific antibody protein negative.    Carotid Doppler dated 11/16/2022 less than 50% stenosis.    Review of DEXA dated 3/24/2022 significant for spine T score -1.3.  Total hip -0.4.  Femoral neck -1.1.  Low FRAX risk for fracture.        Allergies  No Known Allergies    Home Medications    Current Outpatient Medications:     Ascorbic Acid (vitamin C) 1000 MG tablet, Take 1,000 mg by mouth daily, Disp: , Rfl:     azelastine (ASTELIN) 0.1 % nasal spray, 1 spray into each nostril 2 (two) times a day Use in each nostril as directed, Disp: 1 mL, Rfl: 2    Cequa 0.09 % SOLN, PLEASE SEE ATTACHED FOR DETAILED DIRECTIONS, Disp: , Rfl:     Cholecalciferol (Vitamin D3) 125 MCG (5000 UT) TABS, Take 5,000 Units by mouth daily, Disp: , Rfl:     glucosamine-chondroitin 500-400 MG tablet, Take 1 tablet by mouth daily, Disp: , Rfl:     MAGNESIUM GLUCONATE PO, Take 120 mg by mouth daily, Disp: , Rfl:     MELATONIN ER PO, Take 0.5 mg by mouth daily, Disp: , Rfl:     Multiple Vitamins-Minerals (HAIR SKIN NAILS PO), Take 2 capsules by mouth daily, Disp: , Rfl:     QUERCETIN PO, Take 500 mg by mouth daily, Disp: , Rfl:     SODIUM FLUORIDE, DENTAL GEL, (SF 5000 Plus) 1.1 % CREA, USE ON TEETH NIGHTLY, Disp: 102 g, Rfl: 2    TURMERIC CURCUMIN PO, Take 1 g by mouth daily, Disp: , Rfl:     Zinc 30 MG TABS, Take 30 mg by mouth, Disp: , Rfl:     Docosahexaenoic Acid (DHA PO), Take 500 mg by mouth daily (Patient not taking: Reported on 2/21/2023), Disp: , Rfl:     GLUCOSAMINE-CHONDROITIN-MSM PO, Take 300 mg by mouth 3 (three) times a day (Patient not taking: Reported on 2/21/2023), Disp:  , Rfl:     LORazepam (ATIVAN) 0.5 mg tablet, Take 1 tablet (0.5 mg total) by mouth daily as needed for anxiety, Disp: 30 tablet, Rfl: 0    MANGANESE PO, Take 2 mg by mouth (Patient not taking: Reported on 2/21/2023), Disp: , Rfl:     multivitamin (THERAGRAN) TABS, Take 1 tablet by mouth daily (Patient not taking: Reported on 2/21/2023), Disp: , Rfl:     NIACIN PO, Take 20 mg by mouth (Patient not taking: Reported on 2/21/2023), Disp: , Rfl:     zolpidem (AMBIEN) 10 mg tablet, Take 1 tablet (10 mg total) by mouth daily at bedtime as needed for sleep (Patient not taking: Reported on 5/2/2024), Disp: 30 tablet, Rfl: 0    Past Medical History  Past Medical History:   Diagnosis Date    Atrial fibrillation (formerly [facility]) 02/06/2014    Hyperlipidemia     PAF (paroxysmal atrial fibrillation) (formerly [facility]) 02/21/2023    Situational anxiety     Situational insomnia     Sjogren's syndrome (formerly [facility])        Past Surgical History   History reviewed. No pertinent surgical history.    Family History   Family History   Problem Relation Age of Onset    Heart failure Mother     Hypertension Mother     Edema Mother         lymphedema    Celiac disease Father     Other Father         Pacemaker placement    Heart disease Father     Hypertension Father     Prostate cancer Father     Dementia Father     Gout Father     Heart failure Father     Celiac disease Daughter     Allergies Daughter     Other Family         Congenital heart defect       The following portions of the patient's history were reviewed and updated as appropriate: allergies, current medications, past family history, past medical history, past social history, past surgical history, and problem list.    Review of Systems   Constitutional:  Positive for fatigue. Negative for chills and fever.        Fatigue persists  Weight loss more than 40 pounds since initial office visit with anti inflammatory diet   HENT:  Positive for dental problem, mouth sores and trouble swallowing (better). Negative  "for ear pain and sore throat.         Dry mouth using Biotene  Painful mouth sores occasionally  Metallic taste in mouth better  Follows with Dr. Serrato, ENT.   Eyes:  Negative for pain and visual disturbance.        Dry eyes on Cequa   Respiratory:  Positive for cough. Negative for shortness of breath.         Dry cough associated with stress   Cardiovascular:  Positive for chest pain. Negative for palpitations.        Hx PAF not confirmed, more likely PAC's   Gastrointestinal:  Negative for abdominal pain and vomiting.   Genitourinary:  Negative for dysuria and hematuria.        Nocturia 1-2 times nightly   Musculoskeletal:  Positive for arthralgias, back pain, neck pain and neck stiffness.   Skin:  Negative for color change and rash.        Urticaria resolved  Hair thinning  Nail changes with dryness and \"\" from nailbeds   Neurological:  Positive for numbness and headaches. Negative for seizures and syncope.   Psychiatric/Behavioral:          Brain fog persists  Anxiety still a problem due to caring for elderly mother. Patient currently in CBT.   All other systems reviewed and are negative.        Objective:      /70 (BP Location: Left arm, Patient Position: Sitting, Cuff Size: Adult)   Pulse 72   Temp (!) 97.3 °F (36.3 °C) (Temporal)   Ht 5' 5\" (1.651 m)   Wt 69.2 kg (152 lb 9.6 oz)   SpO2 99%   BMI 25.39 kg/m²          Physical Exam  Vitals reviewed.   Constitutional:       Appearance: Normal appearance.   HENT:      Head: Normocephalic.      Nose:      Comments: Nose and throat unremarkable.  Eyes:      Extraocular Movements: Extraocular movements intact.      Comments: Scleral injection bilaterally   Neck:      Comments: Without masses, thyromegaly, lymphadenopathy  Cardiovascular:      Rate and Rhythm: Regular rhythm.   Pulmonary:      Breath sounds: Normal breath sounds.   Abdominal:      Palpations: Abdomen is soft.   Musculoskeletal:      Cervical back: Neck supple.      Comments: " Neck mildly decreased lateral flexion with triggers posterior cervical and shoulder girdle muscles.  Shoulders full range of motion.  Elbows full range of motion with triggers medial and lateral epicondyles.  Wrists full range of motion.  Tinel's negative bilaterally.  Hands squaring 1st carpometacarpal joints bilaterally with mild interphalangeal osteoarthritis.  No synovitis noted.  Triggers anterior chest wall to palpation and dorsal and lumbosacral paraspinals.  Straight leg raising negative bilaterally.  Schober's negative.  No SI joint tenderness appreciated.  Hips full range of motion with triggers trochanteric bursae.  Knees small cool effusions with patellofemoral crepitus with triggers pes anserine bursa.  Ankles full range of motion.  Feet rearfoot hyperpronation with midfoot cavus deformities, high insteps, early hallux valgus deformities and scattered hammertoe deformities.  No synovitis noted.   Skin:     General: Skin is warm.   Neurological:      General: No focal deficit present.               This note was written in part using the assistance of the [employer] [id number] microphone system. Those portions using this system have been dictated and not read.

## 2024-05-15 NOTE — PATIENT INSTRUCTIONS
I would try the special mint, Dr. Cox.  We will give you information on that.  You should go on [employer] and order a paraffin wax bath which you can use for your hand pain.  It is also good for skin of your hands and feet.

## 2024-07-18 ENCOUNTER — RA CDI HCC (OUTPATIENT)
Dept: OTHER | Facility: HOSPITAL | Age: 69
End: 2024-07-18

## 2024-07-24 ENCOUNTER — OFFICE VISIT (OUTPATIENT)
Dept: INTERNAL MEDICINE CLINIC | Facility: CLINIC | Age: 69
End: 2024-07-24
Payer: MEDICARE

## 2024-07-24 VITALS
BODY MASS INDEX: 25.49 KG/M2 | WEIGHT: 153 LBS | HEART RATE: 80 BPM | DIASTOLIC BLOOD PRESSURE: 62 MMHG | HEIGHT: 65 IN | SYSTOLIC BLOOD PRESSURE: 120 MMHG | OXYGEN SATURATION: 98 % | TEMPERATURE: 97 F

## 2024-07-24 DIAGNOSIS — M85.89 OSTEOPENIA OF MULTIPLE SITES: ICD-10-CM

## 2024-07-24 DIAGNOSIS — Z12.31 ENCOUNTER FOR SCREENING MAMMOGRAM FOR BREAST CANCER: ICD-10-CM

## 2024-07-24 DIAGNOSIS — Z79.899 ENCOUNTER FOR LONG-TERM CURRENT USE OF MEDICATION: ICD-10-CM

## 2024-07-24 DIAGNOSIS — M35.00 SJOGREN'S SYNDROME WITHOUT EXTRAGLANDULAR INVOLVEMENT (HCC): Primary | ICD-10-CM

## 2024-07-24 DIAGNOSIS — F41.1 GENERALIZED ANXIETY DISORDER: ICD-10-CM

## 2024-07-24 DIAGNOSIS — E78.5 DYSLIPIDEMIA: ICD-10-CM

## 2024-07-24 DIAGNOSIS — R73.01 ABNORMAL FASTING GLUCOSE: ICD-10-CM

## 2024-07-24 DIAGNOSIS — E55.9 VITAMIN D DEFICIENCY: ICD-10-CM

## 2024-07-24 DIAGNOSIS — R00.2 PALPITATIONS: ICD-10-CM

## 2024-07-24 DIAGNOSIS — K21.9 LARYNGOPHARYNGEAL REFLUX (LPR): ICD-10-CM

## 2024-07-24 DIAGNOSIS — E04.1 THYROID NODULE: ICD-10-CM

## 2024-07-24 DIAGNOSIS — M35.9 UNDIFFERENTIATED CONNECTIVE TISSUE DISEASE (HCC): ICD-10-CM

## 2024-07-24 PROBLEM — M72.2 PLANTAR FASCIITIS: Status: RESOLVED | Noted: 2019-06-06 | Resolved: 2024-07-24

## 2024-07-24 PROCEDURE — 99214 OFFICE O/P EST MOD 30 MIN: CPT | Performed by: INTERNAL MEDICINE

## 2024-07-24 PROCEDURE — 99204 OFFICE O/P NEW MOD 45 MIN: CPT | Performed by: INTERNAL MEDICINE

## 2024-07-24 NOTE — PROGRESS NOTES
Ambulatory Visit  Name: Martine Meza      : 1955      MRN: [id number]  Encounter Provider: Janelle Smith MD  Encounter Date: 2024   Encounter department: [facility] INTERNAL MEDICINE    Assessment & Plan   1. Sjogren's syndrome without extraglandular involvement (HCC)  Assessment & Plan:  Symptomatic treatment.  Sees rheum.    2. Undifferentiated connective tissue disease (HCC)  Assessment & Plan:  As above.  3. Laryngopharyngeal reflux (LPR)  Assessment & Plan:  Instructed to use azelastine bid for a week, then daily.  Sees ENT.  4. Generalized anxiety disorder  Assessment & Plan:  Stable.  Has lorazepam to take prn.  PDMP reviewed.  5. Palpitations  Assessment & Plan:  Reviewed work up, intermittent symptoms.  Saw cardiology.  Orders:  -     CBC and differential; Future; Expected date: 2025  6. Osteopenia of multiple sites  Assessment & Plan:  Bone density due.  Continue daily walks and supplements.  Orders:  -     DXA bone density spine hip and pelvis; Future; Expected date: 2024  7. Vitamin D deficiency  Assessment & Plan:  Taking D3.  Orders:  -     Vitamin D 25 hydroxy; Future; Expected date: 2025  8. Dyslipidemia  Assessment & Plan:  LDL remains elevated.  Not on statin.  Orders:  -     Comprehensive metabolic panel; Future; Expected date: 2025  -     Lipid panel; Future; Expected date: 2025  -     TSH, 3rd generation with Free T4 reflex; Future; Expected date: 2025  9. Encounter for screening mammogram for breast cancer  10. Encounter for long-term current use of medication  -     Vitamin B12; Future; Expected date: 2025  11. Abnormal fasting glucose  -     Hemoglobin A1C; Future; Expected date: 2025  12. Thyroid nodule    Follow up in 6 months or as needed.       History of Present Illness     She complains of frequent post nasal drip.  She saw ENT recently, has a nasal spray which she uses every other day. She feels it has  worsened, coughing more frequently. She feels there is something at the back of her throat. She recalls having issues with this in the past but cough is not as bad as it was.  She has been following a low to no gluten, cheese and sugar for the past 3 years.    She says that her daughter is a functional doctor from [facility], did blood work for her recently. Her cholesterol remains high. She sees cardiology.  She uses her paste, eye drops etc for her Sjogren's.  She feels fairly healthy.    She worries about her elderly mother, takes lorazepam about once a month for anxiety. Her father passed away recently from dementia.      Review of Systems   Constitutional:  Negative for appetite change and fatigue.   HENT:  Negative for congestion, ear pain and postnasal drip.    Eyes:  Negative for visual disturbance.   Respiratory:  Negative for cough and shortness of breath.    Cardiovascular:  Negative for chest pain and leg swelling.   Gastrointestinal:  Negative for abdominal pain, constipation and diarrhea.   Genitourinary:  Negative for dysuria, frequency and urgency.   Musculoskeletal:  Negative for arthralgias and myalgias.   Skin:  Negative for rash and wound.   Neurological:  Negative for dizziness, numbness and headaches.   Hematological:  Does not bruise/bleed easily.   Psychiatric/Behavioral:  Negative for confusion. The patient is not nervous/anxious.      Past Medical History:   Diagnosis Date   • Atrial fibrillation ([facility]) 02/06/2014   • Hyperlipidemia    • PAF (paroxysmal atrial fibrillation) ([facility]) 02/21/2023   • Situational anxiety    • Situational insomnia    • Sjogren's syndrome (HCC)      History reviewed. No pertinent surgical history.  Family History   Problem Relation Age of Onset   • Heart failure Mother    • Hypertension Mother    • Edema Mother         lymphedema   • Celiac disease Father    • Other Father         Pacemaker placement   • Heart disease Father    • Hypertension Father    • Prostate cancer  Father    • Dementia Father    • Gout Father    • Heart failure Father    • Celiac disease Daughter    • Allergies Daughter    • Other Family         Congenital heart defect     Social History     Socioeconomic History   • Marital status: /Civil Union     Spouse name: Not on file   • Number of children: Not on file   • Years of education: Not on file   • Highest education level: Not on file   Occupational History     Comment: Works for an    Tobacco Use   • Smoking status: Never   • Smokeless tobacco: Never   • Tobacco comments:     Denied:  History of smoking cigarettes   Vaping Use   • Vaping status: Never Used   Substance and Sexual Activity   • Alcohol use: No   • Drug use: No   • Sexual activity: Not Currently   Other Topics Concern   • Not on file   Social History Narrative        2 children (daughter,son) - 4 grand kids    Son in [place]    Daughter [facility]     Social Determinants of Health     Financial Resource Strain: Low Risk  (10/25/2023)    Overall Financial Resource Strain (CARDIA)    • Difficulty of Paying Living Expenses: Not hard at all   Food Insecurity: Not on file   Transportation Needs: No Transportation Needs (10/25/2023)    PRAPARE - Transportation    • Lack of Transportation (Medical): No    • Lack of Transportation (Non-Medical): No   Physical Activity: Insufficiently Active (2/22/2021)    Exercise Vital Sign    • Days of Exercise per Week: 2 days    • Minutes of Exercise per Session: 40 min   Stress: Stress Concern Present (2/22/2021)    [employer] [place] [employer] - Occupational Stress Questionnaire    • Feeling of Stress : Rather much   Social Connections: Not on file   Intimate Partner Violence: Not on file   Housing Stability: Not on file       Current Outpatient Medications:   •  Ascorbic Acid (vitamin C) 1000 MG tablet, Take 1,000 mg by mouth daily, Disp: , Rfl:   •  azelastine (ASTELIN) 0.1 % nasal spray, 1 spray into each nostril 2 (two) times a  "day Use in each nostril as directed, Disp: 1 mL, Rfl: 2  •  Cequa 0.09 % SOLN, PLEASE SEE ATTACHED FOR DETAILED DIRECTIONS, Disp: , Rfl:   •  Cholecalciferol (Vitamin D3) 125 MCG (5000 UT) TABS, Take 5,000 Units by mouth daily, Disp: , Rfl:   •  Docosahexaenoic Acid (DHA PO), Take 500 mg by mouth daily (Patient not taking: Reported on 2/21/2023), Disp: , Rfl:   •  glucosamine-chondroitin 500-400 MG tablet, Take 1 tablet by mouth daily, Disp: , Rfl:   •  GLUCOSAMINE-CHONDROITIN-MSM PO, Take 300 mg by mouth 3 (three) times a day (Patient not taking: Reported on 2/21/2023), Disp: , Rfl:   •  LORazepam (ATIVAN) 0.5 mg tablet, Take 1 tablet (0.5 mg total) by mouth daily as needed for anxiety, Disp: 30 tablet, Rfl: 0  •  MAGNESIUM GLUCONATE PO, Take 120 mg by mouth daily, Disp: , Rfl:   •  MANGANESE PO, Take 2 mg by mouth (Patient not taking: Reported on 2/21/2023), Disp: , Rfl:   •  MELATONIN ER PO, Take 0.5 mg by mouth daily, Disp: , Rfl:   •  Multiple Vitamins-Minerals (HAIR SKIN NAILS PO), Take 2 capsules by mouth daily, Disp: , Rfl:   •  multivitamin (THERAGRAN) TABS, Take 1 tablet by mouth daily (Patient not taking: Reported on 2/21/2023), Disp: , Rfl:   •  NIACIN PO, Take 20 mg by mouth (Patient not taking: Reported on 2/21/2023), Disp: , Rfl:   •  QUERCETIN PO, Take 500 mg by mouth daily, Disp: , Rfl:   •  SODIUM FLUORIDE, DENTAL GEL, (SF 5000 Plus) 1.1 % CREA, USE ON TEETH NIGHTLY, Disp: 102 g, Rfl: 2  •  TURMERIC CURCUMIN PO, Take 1 g by mouth daily, Disp: , Rfl:   •  Zinc 30 MG TABS, Take 30 mg by mouth, Disp: , Rfl:   •  zolpidem (AMBIEN) 10 mg tablet, Take 1 tablet (10 mg total) by mouth daily at bedtime as needed for sleep (Patient not taking: Reported on 5/2/2024), Disp: 30 tablet, Rfl: 0  No Known Allergies        Objective     /62   Pulse 80   Temp (!) 97 °F (36.1 °C)   Ht 5' 5\" (1.651 m)   Wt 69.4 kg (153 lb)   SpO2 98%   BMI 25.46 kg/m²     Physical Exam  Vitals and nursing note reviewed. "   Constitutional:       General: She is not in acute distress.     Appearance: She is well-developed.   HENT:      Head: Normocephalic and atraumatic.      Right Ear: Tympanic membrane, ear canal and external ear normal.      Left Ear: Tympanic membrane, ear canal and external ear normal.      Mouth/Throat:      Mouth: Mucous membranes are moist.   Eyes:      Pupils: Pupils are equal, round, and reactive to light.   Cardiovascular:      Rate and Rhythm: Normal rate and regular rhythm.      Heart sounds: Normal heart sounds.   Pulmonary:      Effort: Pulmonary effort is normal.      Breath sounds: Normal breath sounds. No wheezing.   Abdominal:      General: Bowel sounds are normal.      Palpations: Abdomen is soft.   Musculoskeletal:         General: No swelling.      Right lower leg: No edema.      Left lower leg: No edema.   Skin:     General: Skin is warm.      Findings: No rash.   Neurological:      General: No focal deficit present.      Mental Status: She is alert and oriented to person, place, and time.   Psychiatric:         Mood and Affect: Mood and affect normal. Mood is not anxious or depressed.         Behavior: Behavior normal.       Administrative Statements       Reviewed available records.

## 2024-07-29 DIAGNOSIS — F41.9 ANXIETY: ICD-10-CM

## 2024-07-29 RX ORDER — LORAZEPAM 0.5 MG/1
0.5 TABLET ORAL DAILY PRN
Qty: 30 TABLET | Refills: 0 | Status: SHIPPED | OUTPATIENT
Start: 2024-07-29

## 2024-07-29 NOTE — TELEPHONE ENCOUNTER
Reason for call:   [x] Refill   [] Prior Auth  [x] Other: ESTABLISHED WITH NEW PCP    Office:   [x] PCP/Provider - LEOLA OTTO  [] Specialty/Provider -     Medication: LORazepam (ATIVAN) 0.5 mg tablet     Dose/Frequency: 0.5MG    Quantity: 30    Pharmacy: [id number] - [place] - [address]     Does the patient have enough for 3 days?   [x] Yes   [] No - Send as HP to POD

## 2024-08-25 NOTE — ASSESSMENT & PLAN NOTE
Continue Cequa eyedrops and follow up with optometrist as scheduled.  Continue Prevident toothpaste.  Suggest xylitol mints for dry mouth.  Follow-up with dentist every 3 months or sooner if needed.  Continue vigilant dental hygiene.  Continue to monitor.

## 2024-08-25 NOTE — ASSESSMENT & PLAN NOTE
Lupus Avise August 2022 with negative index of -0.4 with low likelihood of lupus.  Beta-2 glycoprotein 1 IgM positive with weekly positive IgG cardiolipin antibody.  Lupus anticoagulant negative.  No history of DVT or pulmonary embolus.  She is unlikely to have phospholipid antibody syndrome with no history of DVT or pulmonary embolism.  Continue to monitor clinically and with lab work as ordered.

## 2024-08-25 NOTE — ASSESSMENT & PLAN NOTE
Osteopenia with low FRAX risk on DEXA March 2022.  Continue calcium and vitamin D supplement.  Encourage home exercise program as tolerated.  Monitor DEXA every 2 years.  She is to schedule follow-up DEXA to monitor.

## 2024-08-25 NOTE — ASSESSMENT & PLAN NOTE
Facial paresthesias and numbness exacerbated with stress.  Would consider diagnostically small fiber neuropathy.  No evidence for large fiber neuropathy.  Consider neurology evaluation if symptoms become more prominent.  Continue to monitor.

## 2024-08-25 NOTE — ASSESSMENT & PLAN NOTE
History positive VANESSA with lupus Avise with low probability for lupus.  Weakly positive IgG cardiolipin antibody with positive beta-2 glycoprotein 1 IgM positive.  Question of paroxysmal atrial fibrillation in the past not confirmed, felt to be more likely atrial tachycardia.  No history of DVT or pulmonary embolism.  Lupus anticoagulant was negative.  No evidence for phospholipid antibody syndrome.  Dry eyes treated with Cequa and followed by optometrist who specializes in dry eye syndrome.  Encourage xylitol mints and sodium fluoride dental paste for dry mouth as well as vigilant dental hygiene and follow-up with dentist every 3 months or sooner if needed.  Monitor disease activity and medication toxicity with lab work as ordered.  Follow-up 6 months or sooner if needed.

## 2024-08-25 NOTE — ASSESSMENT & PLAN NOTE
Patient in CBT since last June with benefit.  Continue CBT as scheduled in view of potential benefit long-term for anxiety.

## 2024-08-25 NOTE — ASSESSMENT & PLAN NOTE
Fibromyalgia with associated triggers, sleep disturbance, chronic fatigue, brain fog.  Generally notes improvement with the anti-inflammatory diet, however, stress exacerbates her symptoms.  She has had 2 flares since the last office visit with associated pruritus without hives, burning throat, increasing joint pain.  Symptoms have abated.  She is caring for her elderly mother with almost daily home visits, which can exacerbate her stress level.  She has been in cognitive behavioral therapy since June with benefit.  Encourage home exercise program as tolerated.  Continue to monitor.

## 2024-10-08 DIAGNOSIS — F41.9 ANXIETY: ICD-10-CM

## 2024-10-08 NOTE — TELEPHONE ENCOUNTER
Reason for call:   [x] Refill   [] Prior Auth  [] Other:     Office:   [x] PCP/Provider -   [] Specialty/Provider -     Medication:   Lorazepam 0.5 mg, 1 qd prn, 30      Pharmacy:   [facility]    Does the patient have enough for 3 days?   [x] Yes   [] No - Send as HP to POD

## 2024-10-09 RX ORDER — LORAZEPAM 0.5 MG/1
0.5 TABLET ORAL DAILY PRN
Qty: 30 TABLET | Refills: 0 | Status: SHIPPED | OUTPATIENT
Start: 2024-10-09

## 2024-10-24 ENCOUNTER — HOSPITAL ENCOUNTER (OUTPATIENT)
Age: 69
Discharge: HOME | End: 2024-10-24
Payer: MEDICARE

## 2024-10-24 VITALS — BODY MASS INDEX: 25.23 KG/M2 | WEIGHT: 157 LBS | HEIGHT: 66 IN

## 2024-10-24 DIAGNOSIS — M85.89 OSTEOPENIA OF MULTIPLE SITES: ICD-10-CM

## 2024-10-24 PROCEDURE — 77080 DXA BONE DENSITY AXIAL: CPT

## 2024-11-05 ENCOUNTER — OFFICE VISIT (OUTPATIENT)
Age: 69
End: 2024-11-05
Payer: MEDICARE

## 2024-11-05 VITALS
DIASTOLIC BLOOD PRESSURE: 60 MMHG | WEIGHT: 155.2 LBS | OXYGEN SATURATION: 100 % | HEIGHT: 65 IN | BODY MASS INDEX: 25.86 KG/M2 | SYSTOLIC BLOOD PRESSURE: 116 MMHG | HEART RATE: 72 BPM | TEMPERATURE: 97.2 F

## 2024-11-05 DIAGNOSIS — R05.3 CHRONIC COUGH: ICD-10-CM

## 2024-11-05 DIAGNOSIS — D68.61 ANTI-PHOSPHOLIPID ANTIBODY SYNDROME (HCC): ICD-10-CM

## 2024-11-05 DIAGNOSIS — M35.9 UNDIFFERENTIATED CONNECTIVE TISSUE DISEASE (HCC): Primary | ICD-10-CM

## 2024-11-05 DIAGNOSIS — F41.1 GENERALIZED ANXIETY DISORDER: ICD-10-CM

## 2024-11-05 DIAGNOSIS — G62.9 SMALL FIBER NEUROPATHY: ICD-10-CM

## 2024-11-05 DIAGNOSIS — M85.89 OSTEOPENIA OF MULTIPLE SITES: ICD-10-CM

## 2024-11-05 DIAGNOSIS — M79.7 FIBROMYALGIA: ICD-10-CM

## 2024-11-05 DIAGNOSIS — M35.00 SJOGREN'S SYNDROME WITHOUT EXTRAGLANDULAR INVOLVEMENT (HCC): ICD-10-CM

## 2024-11-05 DIAGNOSIS — K21.9 LARYNGOPHARYNGEAL REFLUX (LPR): ICD-10-CM

## 2024-11-05 PROCEDURE — 99214 OFFICE O/P EST MOD 30 MIN: CPT | Performed by: INTERNAL MEDICINE

## 2024-11-05 NOTE — PROGRESS NOTES
Assessment/Plan:    Undifferentiated connective tissue disease (HCC)  History positive VANESSA with prior lupus Avise CTD testing with negative index with low probability for lupus.  Weakly positive IgG cardiolipin antibody with positive beta-2 glycoprotein 1 IgM positive.  No history of DVT or pulmonary embolism.  Lupus anticoagulant negative.  No evidence clinically for phospholipid antibody syndrome.  No evidence for lupus activity on repeat lab work.  There had been a question of paroxysmal atrial fibrillation in the past which was not confirmed and felt to be more likely atrial tachycardia.  Dry eyes overall improved with Cequa and she continues to be followed by optometrist who specializes in dry eye syndrome.  Continue to encourage xylitol mints and sodium fluoride dental paste for dry mouth.  Encourage vigilant dental hygiene and follow-up with dentist every 4 to 6 months or sooner if needed.  Monitor disease activity and medication toxicity with lab work as ordered.  Follow-up 6 months or sooner if needed.    Sjogren's syndrome without extraglandular involvement (HCC)  Sjogren syndrome primary versus secondary with stable dry eyes with Cequa eyedrops.  Follow-up with optometrist as scheduled.  Encourage continuation of PreviDent toothpaste.  Encourage xylitol mints for dry mouth.  Encourage vigilant dental hygiene and follow-up with dentist more frequently as scheduled..    Fibromyalgia  Fibromyalgia symptomatically has improved with the anti-inflammatory diet, cognitive behavioral therapy, and home exercise program, however, she does have flares with multiple symptoms with stress. She has intermittent symptoms related to small fiber neuropathy with stress.  Recent stress with her elderly mother.  The family is looking into placement into a facility for her mother as it has become very difficult to care for her at home.  Encourage follow-up with psychologist who works with her on CBT. Encourage home exercise  program as tolerated.    Generalized anxiety disorder  Patient in CBT since last June with overall benefit.  She does have intermittent flares of probable myofascial symptoms secondary to stress with her elderly mother.  Continue CBT as scheduled in view of potential benefit long-term for anxiety.    Anti-phospholipid antibody syndrome (HCC)  Lupus Avise August 2022 with negative index of -0.4 with low likelihood of lupus.  Beta-2 glycoprotein 1 IgM positive with weakly positive IgG cardiolipin antibody.  Lupus anticoagulant negative.  Low likelihood for clinical phospholipid antibody syndrome with no history of DVT or pulmonary embolism.  Continue to monitor clinically and with lab work as ordered.    Osteopenia of multiple sites  Updated DEXA dated 10/24/2024 with osteopenia with low FRAX risk for fracture, however, there was noted to be a drop in BMD in her hip, however, on review of images, there does appear to be a slight difference in hip positioning which would make the comparison less valid.  Continue calcium and vitamin D supplement and home exercise program as tolerated.  Monitor DEXA every 2 years.  No need for treatment with prescription medication.    Small fiber neuropathy  Facial paresthesias and numbness exacerbated with stress most consistent with the diagnosis of small fiber neuropathy.  No evidence for large fiber neuropathy.  Consider neurology evaluation if symptoms are more prominent.  Continue to monitor.    Laryngopharyngeal reflux (LPR)  Follow-up ENT.    Chronic cough  Patient complains of dry cough associated with stress.  Continue CBT.  Continue to monitor.         Problem List Items Addressed This Visit       Generalized anxiety disorder     Patient in CBT since last June with overall benefit.  She does have intermittent flares of probable myofascial symptoms secondary to stress with her elderly mother.  Continue CBT as scheduled in view of potential benefit long-term for anxiety.          Sjogren's syndrome without extraglandular involvement (HCC)     Sjogren syndrome primary versus secondary with stable dry eyes with Cequa eyedrops.  Follow-up with optometrist as scheduled.  Encourage continuation of PreviDent toothpaste.  Encourage xylitol mints for dry mouth.  Encourage vigilant dental hygiene and follow-up with dentist more frequently as scheduled..         Chronic cough     Patient complains of dry cough associated with stress.  Continue CBT.  Continue to monitor.         Undifferentiated connective tissue disease (HCC) - Primary     History positive VANESSA with prior lupus Avise CTD testing with negative index with low probability for lupus.  Weakly positive IgG cardiolipin antibody with positive beta-2 glycoprotein 1 IgM positive.  No history of DVT or pulmonary embolism.  Lupus anticoagulant negative.  No evidence clinically for phospholipid antibody syndrome.  No evidence for lupus activity on repeat lab work.  There had been a question of paroxysmal atrial fibrillation in the past which was not confirmed and felt to be more likely atrial tachycardia.  Dry eyes overall improved with Cequa and she continues to be followed by optometrist who specializes in dry eye syndrome.  Continue to encourage xylitol mints and sodium fluoride dental paste for dry mouth.  Encourage vigilant dental hygiene and follow-up with dentist every 4 to 6 months or sooner if needed.  Monitor disease activity and medication toxicity with lab work as ordered.  Follow-up 6 months or sooner if needed.         Relevant Orders    C-reactive protein    CBC and differential    Comprehensive metabolic panel    dsDNA Antibody by IFA, Crithidia luciliae, with Reflex to Titer    C3 complement    C4 complement    Urinalysis with microscopic    Osteopenia of multiple sites     Updated DEXA dated 10/24/2024 with osteopenia with low FRAX risk for fracture, however, there was noted to be a drop in BMD in her hip, however, on review of  images, there does appear to be a slight difference in hip positioning which would make the comparison less valid.  Continue calcium and vitamin D supplement and home exercise program as tolerated.  Monitor DEXA every 2 years.  No need for treatment with prescription medication.         Small fiber neuropathy     Facial paresthesias and numbness exacerbated with stress most consistent with the diagnosis of small fiber neuropathy.  No evidence for large fiber neuropathy.  Consider neurology evaluation if symptoms are more prominent.  Continue to monitor.         Fibromyalgia     Fibromyalgia symptomatically has improved with the anti-inflammatory diet, cognitive behavioral therapy, and home exercise program, however, she does have flares with multiple symptoms with stress. She has intermittent symptoms related to small fiber neuropathy with stress.  Recent stress with her elderly mother.  The family is looking into placement into a facility for her mother as it has become very difficult to care for her at home.  Encourage follow-up with psychologist who works with her on CBT. Encourage home exercise program as tolerated.         Anti-phospholipid antibody syndrome (HCC)     Lupus Avise August 2022 with negative index of -0.4 with low likelihood of lupus.  Beta-2 glycoprotein 1 IgM positive with weakly positive IgG cardiolipin antibody.  Lupus anticoagulant negative.  Low likelihood for clinical phospholipid antibody syndrome with no history of DVT or pulmonary embolism.  Continue to monitor clinically and with lab work as ordered.         Laryngopharyngeal reflux (LPR)     Follow-up ENT.                Reviewed records, labs, and imaging with the patient in detail.  Counseled patient.  Discussion regarding my findings and recommendations.  Office visit with documentation 35 min.    Subjective:      Patient ID: Martine Meza is a 69 y.o. female.    Patient whose history began approximately several years ago at which  time she developed a tooth problem which ultimately led to a root canal.  Subsequent to that she noted numbness in her lips, then her tongue with associated tingling, which would come and go, then symptoms in the tip of her nose and the lower part of her face, in addition to a metallic taste in her mouth.  She also noted numbness and tingling in her right hand, particularly thumb and the index finger, as well as toes initially right and subsequently left.  She has had hair thinning.  She has had dry fingernails with  at the nailbed.  She has had ongoing dry eyes in addition to dry mouth.  She was seen by the dentist who thought that she likely had Sjogren's.  She has also followed with Ophthalmology and the optometrist who felt she had Sjogren's.  She was seen by a neurologist in [facility], and an MRI dated 02/21/2021 was unremarkable with no evidence for MS.  Neurology ultimately felt that she had small fiber neuropathy.  Patient has had symptoms of feeling flushed as well as sweating.  She has had joint pain in her knees, hips, and shoulders with no prolonged am gelation.  She does note low back pain with occasional radicular symptoms to her feet.  She has had canker sores in her mouth which have been painful.  She notes hair thinning.  She has had some chest pain in the midsternal area with no associated shortness of breath or diaphoresis.  She has been using drops for dry eyes.  She did have punctal plugs.  Schirmer's test was positive for dry eyes.  She has a history of migraine headaches which have been quiescent but does have muscle contraction headaches.  She has had some memory issues with brain fog.  She complained of balance issues.  She has history of sleep disturbance with chronic fatigue.  She also noted weight gain.  She has had chronic dry cough and has a sensation that things get stuck in her throat.  She notes food sensitivities with onions, garlic, and peppers.  Patient did  have an episode of questionable atrial fibrillation at age 58 with no recurrence. Review of the cardiac consultation was significant for negative EKG and stress echo in 2016 which was negative for ischemia.  She was noted to have akinetic inferior segment at baseline. Event monitor in 2016 was significant for occasional PACs with normal sinus rhythm.  She had no abnormality in her wall motion on the echocardiogram and she had normal ejection fraction.  There was a question of an old MI, but the cardiologist felt that in view of normal wall motion and normal ejection fraction, subsequent studies were suggested to further evaluate.  Most recent echocardiogram dated 2/9/2023 significant for left ventricular ejection fraction 60% with grade 1 diastolic dysfunction and mild valvular disease with mild regurgitation aortic valve and tricuspid valve.  CT calcium score dated 3/7/2023 measured at 1.  She has a history of urticaria with no recurrence.    Since the initial office visit August 2022, she has been maintaining an anti-inflammatory diet and states that she has noted overall improvement.  She has lost more than 40 pounds total.  She continues to have significant stress due to caring for her elderly mother.  She does have nonradicular low back pain.  She has chronic dry eye syndrome and has been following with optometrist Dr. Stephens who started her on Cequa with reasonable benefit.  Mouth sores less frequent.  Dry mouth better with Biotene with dental visits without recent decay.  She does note dryness in her nails.  She feels that her sleep, fatigue, and brain fog continue to be problematic.  She has no headache.  She describes intermittent buzzing in her face or numbness which she believes is associated with stress.  She has been dealing with elderly parents and with the passing of her father, she has been caring for her grieving mother. She is going over to give care to her mother on an almost daily basis.  In the  past 3 weeks she has noted more facial flushing, numbness, and sicca symptoms.  She had some overall increase in pain.  She notes pruritus and burning throat associated with these flares.  She relates this to recent stress with her mother.  She also notes waking with generally eating more and not exercising which she feels is related to stress as well.  She continues CBT with overall benefit. She states that her palpitations come and go based on her anxiety level.    Outside lab work brought by the patient at her last office visit was significant for .  Lipoprotein a 105.9.  Apo B normal.  Ferritin 28.  Iron 121.  Albumin 4.1.  TSH 3.45.  Hemoglobin A1c 5.5.  Fasting insulin low at 7.4.  Vitamin D 59.2.  Review of lab work dated 11/14/2023 significant for white blood cell count 4.57 with absolute neutrophil count 2.11.  CRP 1.2.  C3 and C4 complements normal.  Double-stranded DNA antibodies negative.  Estimated GFR 89.  Total protein slightly low at 6.3.  Calcium 9.6.  Urinalysis 1+ dipstick protein. Review of lab work dated 6/12/2023 significant for white blood cell count 4.93 with absolute neutrophil count 2.47.  Platelets 269.  Calcium 9.1.  Estimated GFR 83.  C3-C4 complements normal.  Double-stranded DNA antibodies negative.  TSH 3.491.  Vitamin D 64.7.  CRP less than 3.0.  Urinalysis benign.  Review of lab work dated 8/4/2022 significant for estimated GFR 97.  Double-stranded DNA antibodies negative.  Hemoglobin/hematocrit 11.5/34.9 respectively.  White blood cell count 4.58 with absolute neutrophil count 2.65.  CRP 1.2.  C3 and C4 complements negative. Urinalysis benign.  Lupus Avise connective tissue disease diagnostic with negative index of -0.4.  VANESSA positive by IgG.  Beta-2 glycoprotein 1 IgM positive.  Thyroid antibody negative.  Weakly positive IgG cardiolipin antibody.  Lab work dated 8/18/2022 significant for lupus anticoagulant negative.  Ferritin 30.  Iron 59.     Review of lab work from June  2022 significant for salivary protein 1 IgG +43.9.  IgM and IgA negative.  Carbonic anhydrase VI IgG positive at 24.7.  IgA and IgM negative.  Parotid specific antibody protein negative.    Carotid Doppler dated 11/16/2022 less than 50% stenosis.    Review of DEXA dated 10/24/2024 significant for lumbar spine T-score -1.5.  Total hip T-score -0.7 with a decrease of 4.7% as compared to the prior study.  Femoral neck T-score -1.3.  FRAX risk for fracture low risk.  There was some change in hip positioning from the prior DEXA, making the comparison less valid.  Review of DEXA dated 3/24/2022 significant for spine T score -1.3.  Total hip -0.4.  Femoral neck -1.1.  Low FRAX risk for fracture.        Allergies  No Known Allergies    Home Medications    Current Outpatient Medications:     Ascorbic Acid (vitamin C) 1000 MG tablet, Take 1,000 mg by mouth daily, Disp: , Rfl:     azelastine (ASTELIN) 0.1 % nasal spray, 1 spray into each nostril 2 (two) times a day Use in each nostril as directed, Disp: 1 mL, Rfl: 2    Cequa 0.09 % SOLN, PLEASE SEE ATTACHED FOR DETAILED DIRECTIONS, Disp: , Rfl:     Cholecalciferol (Vitamin D3) 125 MCG (5000 UT) TABS, Take 5,000 Units by mouth daily, Disp: , Rfl:     glucosamine-chondroitin 500-400 MG tablet, Take 1 tablet by mouth daily, Disp: , Rfl:     LORazepam (ATIVAN) 0.5 mg tablet, Take 1 tablet (0.5 mg total) by mouth daily as needed for anxiety, Disp: 30 tablet, Rfl: 0    MAGNESIUM GLUCONATE PO, Take 120 mg by mouth daily, Disp: , Rfl:     MELATONIN ER PO, Take 0.5 mg by mouth daily, Disp: , Rfl:     Multiple Vitamins-Minerals (HAIR SKIN NAILS PO), Take 2 capsules by mouth daily, Disp: , Rfl:     QUERCETIN PO, Take 500 mg by mouth daily, Disp: , Rfl:     SODIUM FLUORIDE, DENTAL GEL, (SF 5000 Plus) 1.1 % CREA, USE ON TEETH NIGHTLY, Disp: 102 g, Rfl: 2    TURMERIC CURCUMIN PO, Take 1 g by mouth daily, Disp: , Rfl:     Zinc 30 MG TABS, Take 30 mg by mouth, Disp: , Rfl:     Docosahexaenoic  Acid (DHA PO), Take 500 mg by mouth daily (Patient not taking: Reported on 2/21/2023), Disp: , Rfl:     GLUCOSAMINE-CHONDROITIN-MSM PO, Take 300 mg by mouth 3 (three) times a day (Patient not taking: Reported on 2/21/2023), Disp: , Rfl:     MANGANESE PO, Take 2 mg by mouth (Patient not taking: Reported on 2/21/2023), Disp: , Rfl:     multivitamin (THERAGRAN) TABS, Take 1 tablet by mouth daily (Patient not taking: Reported on 2/21/2023), Disp: , Rfl:     NIACIN PO, Take 20 mg by mouth (Patient not taking: Reported on 2/21/2023), Disp: , Rfl:     zolpidem (AMBIEN) 10 mg tablet, Take 1 tablet (10 mg total) by mouth daily at bedtime as needed for sleep (Patient not taking: Reported on 5/2/2024), Disp: 30 tablet, Rfl: 0    Past Medical History  Past Medical History:   Diagnosis Date    Atrial fibrillation ([facility]) 02/06/2014    Hyperlipidemia     PAF (paroxysmal atrial fibrillation) ([facility]) 02/21/2023    Situational anxiety     Situational insomnia     Sjogren's syndrome ([facility])        Past Surgical History   History reviewed. No pertinent surgical history.    Family History   Family History   Problem Relation Age of Onset    Heart failure Mother     Hypertension Mother     Edema Mother         lymphedema    Celiac disease Father     Other Father         Pacemaker placement    Heart disease Father     Hypertension Father     Prostate cancer Father     Dementia Father     Gout Father     Heart failure Father     Celiac disease Daughter     Allergies Daughter     Other Family         Congenital heart defect       The following portions of the patient's history were reviewed and updated as appropriate: allergies, current medications, past family history, past medical history, past social history, past surgical history, and problem list.    Review of Systems   Constitutional:  Positive for fatigue. Negative for chills and fever.        Fatigue persists  Weight loss more than 40 pounds since initial office visit with anti  "inflammatory diet   HENT:  Positive for dental problem, mouth sores and trouble swallowing (better). Negative for ear pain and sore throat.         Dry mouth using Biotene  Painful mouth sores occasionally  Metallic taste in mouth better  Follows with Dr. Serrato, ENT.   Eyes:  Negative for pain and visual disturbance.        Dry eyes on Cequa   Respiratory:  Positive for cough. Negative for shortness of breath.         Dry cough associated with stress   Cardiovascular:  Positive for chest pain. Negative for palpitations.        Hx PAF not confirmed, more likely PAC's   Gastrointestinal:  Negative for abdominal pain and vomiting.   Genitourinary:  Negative for dysuria and hematuria.        Nocturia 8 times nightly  Drinking a lot   Musculoskeletal:  Positive for arthralgias, back pain, neck pain and neck stiffness.   Skin:  Negative for color change and rash.        Urticaria resolved  Hair thinning  Nail changes with dryness and \"\" from nailbeds   Neurological:  Positive for numbness and headaches. Negative for seizures and syncope.   Psychiatric/Behavioral:          Brain fog persists  Anxiety still a problem due to caring for elderly mother. Patient currently in CBT.   All other systems reviewed and are negative.        Objective:      /60 (BP Location: Right arm, Patient Position: Sitting, Cuff Size: Adult)   Pulse 72   Temp (!) 97.2 °F (36.2 °C) (Temporal)   Ht 5' 5.25\" (1.657 m)   Wt 70.4 kg (155 lb 3.2 oz)   SpO2 100%   BMI 25.63 kg/m²          Physical Exam  Vitals reviewed.   Constitutional:       Appearance: Normal appearance.   HENT:      Head: Normocephalic.      Nose:      Comments: Nose and throat unremarkable.  Eyes:      Extraocular Movements: Extraocular movements intact.      Comments: Scleral injection bilaterally   Neck:      Comments: Without masses, thyromegaly, lymphadenopathy  Cardiovascular:      Rate and Rhythm: Regular rhythm.   Pulmonary:      Breath sounds: Normal " breath sounds.   Abdominal:      Palpations: Abdomen is soft.   Musculoskeletal:      Cervical back: Neck supple.      Comments: Neck mildly decreased lateral flexion with tightness posterior cervical and shoulder girdle muscles.  Shoulders full range of motion.  Elbows full range of motion with triggers medial and lateral epicondyles.  Wrists full range of motion.  Tinel's negative bilaterally.  Hands squaring 1st carpometacarpal joints bilaterally with mild interphalangeal osteoarthritis.  No synovitis noted.  Spasm dorsal and lumbosacral paraspinals.  Straight leg raising negative bilaterally.  Schober's negative.  No SI joint tenderness appreciated.  Hips full range of motion with triggers trochanteric bursae.  Knees small cool effusions with patellofemoral crepitus with triggers pes anserine bursa.  Ankles full range of motion.  Feet rearfoot hyperpronation with midfoot cavus deformities, high insteps, early hallux valgus deformities and scattered hammertoe deformities.  No synovitis noted.   Skin:     General: Skin is warm.   Neurological:      General: No focal deficit present.               This note was written in part using the assistance of the [email or web address] Direct [id number] microphone system. Those portions using this system have been dictated and not read.

## 2024-11-05 NOTE — PATIENT INSTRUCTIONS
I did put in for lab work for you.  I would do that whenever your primary care physician orders labs so you only get stuck once.  For a new GYN I would suggest Dr. Estrellita Summers who mainly handles menopausal patients and is excellent.  Your bone density is fine and you do not need prescription medication.  I suspect the little drop in the total hip is based on a slight change in hip positioning and likely is not a great comparison.  I would try and be consistent with your diet and exercise.  If you gain weight, you really should look at what number of calories that you are actually eating, particularly if you are not exercising.  For dermatology, I would recommend Dr. Delacruz or anyone in his group.  They are excellent.

## 2024-11-06 ENCOUNTER — TELEPHONE (OUTPATIENT)
Dept: INTERNAL MEDICINE CLINIC | Facility: CLINIC | Age: 69
End: 2024-11-06

## 2024-11-06 NOTE — TELEPHONE ENCOUNTER
Bone density shows osteopenia, slightly worse than previous. Please continue your daily calcium at least 1200 mg and vitamin D3 at least 1000 units. Recommend to walk daily.     MyChart message already sent.

## 2024-11-10 NOTE — ASSESSMENT & PLAN NOTE
History positive VANESSA with prior lupus Avise CTD testing with negative index with low probability for lupus.  Weakly positive IgG cardiolipin antibody with positive beta-2 glycoprotein 1 IgM positive.  No history of DVT or pulmonary embolism.  Lupus anticoagulant negative.  No evidence clinically for phospholipid antibody syndrome.  No evidence for lupus activity on repeat lab work.  There had been a question of paroxysmal atrial fibrillation in the past which was not confirmed and felt to be more likely atrial tachycardia.  Dry eyes overall improved with Cequa and she continues to be followed by optometrist who specializes in dry eye syndrome.  Continue to encourage xylitol mints and sodium fluoride dental paste for dry mouth.  Encourage vigilant dental hygiene and follow-up with dentist every 4 to 6 months or sooner if needed.  Monitor disease activity and medication toxicity with lab work as ordered.  Follow-up 6 months or sooner if needed.

## 2024-11-10 NOTE — ASSESSMENT & PLAN NOTE
Fibromyalgia symptomatically has improved with the anti-inflammatory diet, cognitive behavioral therapy, and home exercise program, however, she does have flares with multiple symptoms with stress. She has intermittent symptoms related to small fiber neuropathy with stress.  Recent stress with her elderly mother.  The family is looking into placement into a facility for her mother as it has become very difficult to care for her at home.  Encourage follow-up with psychologist who works with her on CBT. Encourage home exercise program as tolerated.

## 2024-11-10 NOTE — ASSESSMENT & PLAN NOTE
Patient in CBT since last June with overall benefit.  She does have intermittent flares of probable myofascial symptoms secondary to stress with her elderly mother.  Continue CBT as scheduled in view of potential benefit long-term for anxiety.

## 2024-11-10 NOTE — ASSESSMENT & PLAN NOTE
Lupus Avise August 2022 with negative index of -0.4 with low likelihood of lupus.  Beta-2 glycoprotein 1 IgM positive with weakly positive IgG cardiolipin antibody.  Lupus anticoagulant negative.  Low likelihood for clinical phospholipid antibody syndrome with no history of DVT or pulmonary embolism.  Continue to monitor clinically and with lab work as ordered.

## 2024-11-10 NOTE — ASSESSMENT & PLAN NOTE
Facial paresthesias and numbness exacerbated with stress most consistent with the diagnosis of small fiber neuropathy.  No evidence for large fiber neuropathy.  Consider neurology evaluation if symptoms are more prominent.  Continue to monitor.

## 2024-11-10 NOTE — ASSESSMENT & PLAN NOTE
Sjogren syndrome primary versus secondary with stable dry eyes with Cequa eyedrops.  Follow-up with optometrist as scheduled.  Encourage continuation of PreviDent toothpaste.  Encourage xylitol mints for dry mouth.  Encourage vigilant dental hygiene and follow-up with dentist more frequently as scheduled..

## 2024-11-10 NOTE — ASSESSMENT & PLAN NOTE
Updated DEXA dated 10/24/2024 with osteopenia with low FRAX risk for fracture, however, there was noted to be a drop in BMD in her hip, however, on review of images, there does appear to be a slight difference in hip positioning which would make the comparison less valid.  Continue calcium and vitamin D supplement and home exercise program as tolerated.  Monitor DEXA every 2 years.  No need for treatment with prescription medication.

## 2024-12-17 DIAGNOSIS — F41.9 ANXIETY: ICD-10-CM

## 2024-12-17 NOTE — TELEPHONE ENCOUNTER
Reason for call:   [x] Refill   [] Prior Auth  [] Other:     Office:   [x] PCP/Provider - PRIMARY CARE ROBERT Smith MD   [] Specialty/Provider -     Medication:  LORazepam (ATIVAN) 0.5 mg tablet    Dose/Frequency: Take 1 tablet (0.5 mg total) by mouth daily as needed for anxiety     Quantity: 30 tablet     Pharmacy: [facility] #[id number] - [place] - [address] [phone]    Does the patient have enough for 3 days?   [x] Yes   [] No - Send as HP to POD

## 2024-12-18 RX ORDER — LORAZEPAM 0.5 MG/1
0.5 TABLET ORAL DAILY PRN
Qty: 30 TABLET | Refills: 0 | Status: SHIPPED | OUTPATIENT
Start: 2024-12-18

## 2025-03-13 ENCOUNTER — TELEPHONE (OUTPATIENT)
Age: 70
End: 2025-03-13

## 2025-03-13 DIAGNOSIS — J06.9 UPPER RESPIRATORY TRACT INFECTION, UNSPECIFIED TYPE: Primary | ICD-10-CM

## 2025-03-13 RX ORDER — AZITHROMYCIN 250 MG/1
TABLET, FILM COATED ORAL
Qty: 6 TABLET | Refills: 0 | Status: SHIPPED | OUTPATIENT
Start: 2025-03-13 | End: 2025-03-17

## 2025-03-13 NOTE — TELEPHONE ENCOUNTER
I spoke with Martine.  She said she hanson had nasal congestion for 2 days, each day gets worse.   Her trip is for 1 week.

## 2025-03-13 NOTE — TELEPHONE ENCOUNTER
If you just started with symptoms, I do not think you would need it during your trip.    Antibiotic sent to the pharmacy.

## 2025-03-13 NOTE — TELEPHONE ENCOUNTER
Patient called and stated her  Jose A was seen on 2/26 with Dr Smith and was given an antibiotic incase patient gets sick with a sinus infection while he is away for travel. Patient is wondering if she could possibly be prescribed the same thing as well incase she becomes sick. She stated they are leaving tomorrow and just doesn't want to have to go to an urgent care where she is staying. Please call patient back to discuss.

## 2025-04-02 ENCOUNTER — OFFICE VISIT (OUTPATIENT)
Dept: OBGYN CLINIC | Facility: CLINIC | Age: 70
End: 2025-04-02
Payer: MEDICARE

## 2025-04-02 VITALS
HEIGHT: 66 IN | SYSTOLIC BLOOD PRESSURE: 120 MMHG | WEIGHT: 159.6 LBS | DIASTOLIC BLOOD PRESSURE: 76 MMHG | BODY MASS INDEX: 25.65 KG/M2

## 2025-04-02 DIAGNOSIS — D68.61 ANTI-PHOSPHOLIPID ANTIBODY SYNDROME (HCC): ICD-10-CM

## 2025-04-02 DIAGNOSIS — Z01.419 ENCOUNTER FOR ANNUAL ROUTINE GYNECOLOGICAL EXAMINATION: Primary | ICD-10-CM

## 2025-04-02 DIAGNOSIS — Z12.31 ENCOUNTER FOR SCREENING MAMMOGRAM FOR BREAST CANCER: ICD-10-CM

## 2025-04-02 DIAGNOSIS — M79.7 FIBROMYALGIA: ICD-10-CM

## 2025-04-02 DIAGNOSIS — E78.5 DYSLIPIDEMIA: ICD-10-CM

## 2025-04-02 DIAGNOSIS — M35.00 SJOGREN'S SYNDROME WITHOUT EXTRAGLANDULAR INVOLVEMENT (HCC): ICD-10-CM

## 2025-04-02 DIAGNOSIS — M35.9 UNDIFFERENTIATED CONNECTIVE TISSUE DISEASE (HCC): ICD-10-CM

## 2025-04-02 PROCEDURE — G0145 SCR C/V CYTO,THINLAYER,RESCR: HCPCS | Performed by: OBSTETRICS & GYNECOLOGY

## 2025-04-02 PROCEDURE — G0101 CA SCREEN;PELVIC/BREAST EXAM: HCPCS | Performed by: OBSTETRICS & GYNECOLOGY

## 2025-04-02 NOTE — PROGRESS NOTES
Name: Martine Meza      : 1955      MRN: [id number]  Encounter Provider: Ophelia Summers DO  Encounter Date: 2025   Encounter department: [facility]  :  Assessment & Plan  Encounter for annual routine gynecological examination         Encounter for screening mammogram for breast cancer    Orders:    Mammo screening bilateral w 3d and cad; Future    Anti-phospholipid antibody syndrome (HCC)         Undifferentiated connective tissue disease (HCC)         Fibromyalgia         Dyslipidemia         Sjogren's syndrome without extraglandular involvement (HCC)         Pap done for cytology, reflex HPV.  Encouraged self breast examination as well as calcium supplementation.  Continue annual mammogram.  Reviewed colon cancer screening.  Planning on Cologuard.  Reviewed osteopenia follow-up, up to date, continues to follow-up with rheumatology.  She will continue to follow-up with dermatology, ENT, cognitive behavioral therapist, primary care as scheduled.  Return to office in 1 year or as needed    History of Present Illness   HPI  Martine Meza is a 69 y.o. female who presents     This is a pleasant 69-year-old female P2 ( x 2, age 46, 37) presents as a new patient for GYN exam.  Last GYN exam was approximately 2 to 3 years ago.  Patient went through menopause at age 43.  She was never on hormone replacement therapy.  She denies any vaginal bleeding or spotting.  No changes in bowel or bladder function.  She is not sexually active.  She has been  for over 47 years.  Pap smears have been normal.    She was diagnosed with Sjogren's syndrome approximately 3 years ago.  She has changed her diet completely, exercising regularly.  She follows up with rheumatology.  She also sees a functional medicine physician, primary care, dermatology, ENT.    Mammo 2022    Colon 50, has cologuard    Pap 2020    Dexa 10/2024 osteopenia      History obtained from: patient    Review of Systems    Constitutional:  Negative for fatigue, fever and unexpected weight change.   Respiratory:  Negative for cough, chest tightness, shortness of breath and wheezing.    Cardiovascular: Negative.  Negative for chest pain and palpitations.   Gastrointestinal: Negative.  Negative for abdominal distention, abdominal pain, blood in stool, constipation, diarrhea, nausea and vomiting.   Genitourinary: Negative.  Negative for difficulty urinating, dyspareunia, dysuria, flank pain, frequency, genital sores, hematuria, pelvic pain, urgency, vaginal bleeding, vaginal discharge and vaginal pain.   Skin:  Negative for rash.     Current Outpatient Medications on File Prior to Visit   Medication Sig Dispense Refill    Ascorbic Acid (vitamin C) 1000 MG tablet Take 1,000 mg by mouth daily      Cequa 0.09 % SOLN PLEASE SEE ATTACHED FOR DETAILED DIRECTIONS      Cholecalciferol (Vitamin D3) 125 MCG (5000 UT) TABS Take 5,000 Units by mouth daily      Docosahexaenoic Acid (DHA PO) Take 500 mg by mouth daily      glucosamine-chondroitin 500-400 MG tablet Take 1 tablet by mouth daily      MAGNESIUM GLUCONATE PO Take 120 mg by mouth daily      MANGANESE PO Take 2 mg by mouth      multivitamin (THERAGRAN) TABS Take 1 tablet by mouth daily      NIACIN PO Take 20 mg by mouth      QUERCETIN PO Take 500 mg by mouth daily      SODIUM FLUORIDE, DENTAL GEL, (SF 5000 Plus) 1.1 % CREA USE ON TEETH NIGHTLY 102 g 2    TURMERIC CURCUMIN PO Take 1 g by mouth daily      Zinc 30 MG TABS Take 30 mg by mouth      azelastine (ASTELIN) 0.1 % nasal spray 1 spray into each nostril 2 (two) times a day Use in each nostril as directed (Patient not taking: Reported on 4/2/2025) 30 mL 2    GLUCOSAMINE-CHONDROITIN-MSM PO Take 300 mg by mouth 3 (three) times a day (Patient not taking: Reported on 2/21/2023)      LORazepam (ATIVAN) 0.5 mg tablet Take 1 tablet (0.5 mg total) by mouth daily as needed for anxiety (Patient not taking: Reported on 4/2/2025) 30 tablet 0     "MELATONIN ER PO Take 0.5 mg by mouth daily (Patient not taking: Reported on 4/2/2025)      Multiple Vitamins-Minerals (HAIR SKIN NAILS PO) Take 2 capsules by mouth daily (Patient not taking: Reported on 4/2/2025)      zolpidem (AMBIEN) 10 mg tablet Take 1 tablet (10 mg total) by mouth daily at bedtime as needed for sleep (Patient not taking: Reported on 5/2/2024) 30 tablet 0     No current facility-administered medications on file prior to visit.         Objective   /76   Ht 5' 6\" (1.676 m)   Wt 72.4 kg (159 lb 9.6 oz)   BMI 25.76 kg/m²      Physical Exam  Constitutional:       Appearance: Normal appearance. She is well-developed.   HENT:      Head: Normocephalic and atraumatic.   Cardiovascular:      Rate and Rhythm: Normal rate and regular rhythm.   Pulmonary:      Effort: Pulmonary effort is normal.      Breath sounds: Normal breath sounds.   Chest:   Breasts:     Right: No inverted nipple, mass, nipple discharge, skin change or tenderness.      Left: No inverted nipple, mass, nipple discharge, skin change or tenderness.   Abdominal:      General: Bowel sounds are normal. There is no distension.      Palpations: Abdomen is soft.      Tenderness: There is no abdominal tenderness. There is no guarding or rebound.   Genitourinary:     Labia:         Right: No rash, tenderness or lesion.         Left: No rash, tenderness or lesion.       Vagina: Normal. No signs of injury. No vaginal discharge or tenderness.      Cervix: No cervical motion tenderness, discharge, friability, lesion or cervical bleeding.      Uterus: Not enlarged and not tender.       Adnexa:         Right: No mass, tenderness or fullness.          Left: No mass, tenderness or fullness.     Neurological:      Mental Status: She is alert.   Psychiatric:         Behavior: Behavior normal.     External genitalia is within normal limits.  The vagina is evident of slight estrogen deficiency.  Cervix is small.  No pelvic floor " prolapse.    Administrative Statements   I have spent a total time of 30 minutes in caring for this patient on the day of the visit/encounter including Impressions, Counseling / Coordination of care, Documenting in the medical record, Reviewing/placing orders in the medical record (including tests, medications, and/or procedures), and Obtaining or reviewing history  .

## 2025-04-08 LAB
LAB AP GYN PRIMARY INTERPRETATION: NORMAL
Lab: NORMAL

## 2025-04-28 ENCOUNTER — OFFICE VISIT (OUTPATIENT)
Dept: CARDIOLOGY CLINIC | Facility: CLINIC | Age: 70
End: 2025-04-28
Payer: MEDICARE

## 2025-04-28 VITALS
WEIGHT: 157.1 LBS | HEART RATE: 67 BPM | DIASTOLIC BLOOD PRESSURE: 62 MMHG | BODY MASS INDEX: 25.25 KG/M2 | SYSTOLIC BLOOD PRESSURE: 118 MMHG | HEIGHT: 66 IN

## 2025-04-28 DIAGNOSIS — I35.1 NONRHEUMATIC AORTIC VALVE INSUFFICIENCY: ICD-10-CM

## 2025-04-28 DIAGNOSIS — E78.5 DYSLIPIDEMIA: Primary | ICD-10-CM

## 2025-04-28 LAB
ATRIAL RATE: 67 BPM
P AXIS: 78 DEGREES
PR INTERVAL: 176 MS
QRS AXIS: -63 DEGREES
QRSD INTERVAL: 90 MS
QT INTERVAL: 392 MS
QTC INTERVAL: 414 MS
T WAVE AXIS: 74 DEGREES
VENTRICULAR RATE: 67 BPM

## 2025-04-28 PROCEDURE — 93000 ELECTROCARDIOGRAM COMPLETE: CPT | Performed by: INTERNAL MEDICINE

## 2025-04-28 PROCEDURE — 99214 OFFICE O/P EST MOD 30 MIN: CPT | Performed by: INTERNAL MEDICINE

## 2025-04-28 NOTE — PROGRESS NOTES
Cardiology Follow-up    Martine Meza  [id number]  1955  HEART & VASCULAR [facility]  [address] [facility]  [place] [id number]    1. Dyslipidemia  POCT ECG      2. Nonrheumatic aortic valve insufficiency          Discussion/Summary:  Ms. Meza is a pleasant 69-year-old female who presents to the office today for the re-evaluation of palpitations.     In 2023 she did undergo a Zio patch.  She did have symptoms of palpitations which correlated with normal sinus rhythm.  No further testing is advised.  She did have an echo at that time revealing mild aortic regurgitation.  I will request a repeat echo for reassessment after her next visit.     Otherwise her blood pressure is well-controlled in the office today.  She is on no antihypertensive medication.  A low-salt diet was reinforced.    I have no recent assessment of her lipids.  A prescription was provided by her PCP and I will await the results.  She had a calcium score of one in 2023.     I will see her back in the office in one year or sooner if deemed necessary.    History of Present Illness:  Ms. Meza is a very pleasant 69-year-old female who presents to the office today for routine follow-up.     She continues with intermittent palpitations.  They started up again about three months ago.  Prior to this she was feeling well.  She states that she can have palpitations on a nightly basis described as a rapid heartbeat.  If she gets up and walks around her symptoms will gradually resolve within about 10 minutes.  The palpitations are not associated with any other symptoms.  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      She is active.  She walks for exercise a few days per week.  In doing so she feels well.  She denies any exertional chest pain or shortness of breath.  She denies any signs or symptoms of congestive heart failure  including lower extremity edema, paroxysmal nocturnal dyspnea, orthopnea, acute weight gain or increasing abdominal girth.  She denies lightheadedness, syncope or presyncope.  She denies symptoms of claudication.     Patient Active Problem List   Diagnosis    Dyslipidemia    Thyroid nodule    Chronic fatigue    Solar degeneration    Peripheral neuropathy    Lower extremity tendinopathy    Generalized anxiety disorder    Degenerative joint disease of left ankle and foot    Palpitations    Facial numbness    Sjogren's syndrome without extraglandular involvement ([facility])    Venous insufficiency    Chronic cough    Dysphonia    Pharyngoesophageal dysphagia    Undifferentiated connective tissue disease ([facility])    Vocal fold atrophy-right    Sulcus vocalis of vocal fold-right    Glottic insufficiency    Muscle tension dysphonia    TMJPDS (temporomandibular joint pain dysfunction syndrome)    Cervical spine disease    Cervical vertigo    Benzodiazepine dependence ([facility])    Osteopenia of multiple sites    Small fiber neuropathy    Fibromyalgia    Anti-phospholipid antibody syndrome ([facility])    Laryngopharyngeal reflux (LPR)    Vitamin D deficiency    Nonrheumatic aortic valve insufficiency     Past Medical History:   Diagnosis Date    Atrial fibrillation ([facility]) 02/06/2014    Hyperlipidemia     PAF (paroxysmal atrial fibrillation) ([facility]) 02/21/2023    Situational anxiety     Situational insomnia     Sjogren's syndrome ([facility])      Social History     Socioeconomic History    Marital status: /Civil Union     Spouse name: Not on file    Number of children: Not on file    Years of education: Not on file    Highest education level: Not on file   Occupational History     Comment: Works for an    Tobacco Use    Smoking status: Never    Smokeless tobacco: Never    Tobacco comments:     Denied:  History of smoking cigarettes   Vaping Use    Vaping status: Never Used   Substance and Sexual Activity    Alcohol use: No     Drug use: No    Sexual activity: Not Currently   Other Topics Concern    Not on file   Social History Narrative        2 children (daughter,son) - 4 grand kids    Son in [place]    Daughter [facility]     Social Drivers of Health     Financial Resource Strain: Low Risk  (10/25/2023)    Overall Financial Resource Strain (CARDIA)     Difficulty of Paying Living Expenses: Not hard at all   Food Insecurity: Not on file   Transportation Needs: No Transportation Needs (10/25/2023)    PRAPARE - Transportation     Lack of Transportation (Medical): No     Lack of Transportation (Non-Medical): No   Physical Activity: Insufficiently Active (2/22/2021)    Exercise Vital Sign     Days of Exercise per Week: 2 days     Minutes of Exercise per Session: 40 min   Stress: Stress Concern Present (2/22/2021)    [employer] [place] [employer] - Occupational Stress Questionnaire     Feeling of Stress : Rather much   Social Connections: Not on file   Intimate Partner Violence: Not on file   Housing Stability: Not on file      Family History   Problem Relation Age of Onset    Heart failure Mother     Hypertension Mother     Edema Mother         lymphedema    Celiac disease Father     Other Father         Pacemaker placement    Heart disease Father     Hypertension Father     Prostate cancer Father     Dementia Father     Gout Father     Heart failure Father     Celiac disease Daughter     Allergies Daughter     Other Family         Congenital heart defect     No past surgical history on file.    Current Outpatient Medications:     Ascorbic Acid (vitamin C) 1000 MG tablet, Take 1,000 mg by mouth daily, Disp: , Rfl:     Cholecalciferol (Vitamin D3) 125 MCG (5000 UT) TABS, Take 5,000 Units by mouth daily, Disp: , Rfl:     Docosahexaenoic Acid (DHA PO), Take 500 mg by mouth daily, Disp: , Rfl:     glucosamine-chondroitin 500-400 MG tablet, Take 1 tablet by mouth daily, Disp: , Rfl:     GLUCOSAMINE-CHONDROITIN-MSM PO, Take 300 mg by mouth 3  (three) times a day, Disp: , Rfl:     MAGNESIUM GLUCONATE PO, Take 120 mg by mouth daily, Disp: , Rfl:     MANGANESE PO, Take 2 mg by mouth, Disp: , Rfl:     multivitamin (THERAGRAN) TABS, Take 1 tablet by mouth daily, Disp: , Rfl:     NIACIN PO, Take 20 mg by mouth, Disp: , Rfl:     QUERCETIN PO, Take 500 mg by mouth daily, Disp: , Rfl:     SODIUM FLUORIDE, DENTAL GEL, (SF 5000 Plus) 1.1 % CREA, USE ON TEETH NIGHTLY, Disp: 102 g, Rfl: 2    TURMERIC CURCUMIN PO, Take 1 g by mouth daily, Disp: , Rfl:     Zinc 30 MG TABS, Take 30 mg by mouth, Disp: , Rfl:     azelastine (ASTELIN) 0.1 % nasal spray, 1 spray into each nostril 2 (two) times a day Use in each nostril as directed (Patient not taking: Reported on 4/28/2025), Disp: 30 mL, Rfl: 2    Cequa 0.09 % SOLN, PLEASE SEE ATTACHED FOR DETAILED DIRECTIONS, Disp: , Rfl:     LORazepam (ATIVAN) 0.5 mg tablet, Take 1 tablet (0.5 mg total) by mouth daily as needed for anxiety (Patient not taking: Reported on 4/28/2025), Disp: 30 tablet, Rfl: 0    MELATONIN ER PO, Take 0.5 mg by mouth daily (Patient not taking: Reported on 4/2/2025), Disp: , Rfl:     Multiple Vitamins-Minerals (HAIR SKIN NAILS PO), Take 2 capsules by mouth daily (Patient not taking: Reported on 4/2/2025), Disp: , Rfl:     zolpidem (AMBIEN) 10 mg tablet, Take 1 tablet (10 mg total) by mouth daily at bedtime as needed for sleep (Patient not taking: Reported on 5/2/2024), Disp: 30 tablet, Rfl: 0  No Known Allergies        Imaging: Echo complete w/ contrast if indicated    Result Date: 2/9/2023  Narrative:   Left Ventricle: Left ventricular cavity size is normal. Wall thickness is normal. The left ventricular ejection fraction is 60%. Systolic function is normal. Wall motion is normal. Diastolic function is mildly abnormal, consistent with grade I (abnormal) relaxation.   Aortic Valve: There is mild regurgitation.   Tricuspid Valve: There is mild regurgitation.     Review of Systems:  Review of Systems  "  Respiratory:  Negative for chest tightness and shortness of breath.    Cardiovascular:  Positive for palpitations. Negative for leg swelling.   All other systems reviewed and are negative.        Vitals:    04/28/25 0907   BP: 118/62   Pulse: 67   Weight: 71.3 kg (157 lb 1.6 oz)   Height: 5' 6\" (1.676 m)         Vitals:    04/28/25 0907   Weight: 71.3 kg (157 lb 1.6 oz)         Height: 5' 6\" (167.6 cm)     Physical Exam:  General appearance:  Appears stated age, alert, well appearing and in no distress  HEENT:  PERRLA, EOMI, no scleral icterus, no conjunctival pallor  NECK:  Supple, No elevated JVP, no thyromegaly, no carotid bruits  HEART:  Regular rate and rhythm, normal S1/S2, no S3/S4, no murmur or rub  LUNGS:  Clear to auscultation bilaterally  ABDOMEN:  Soft, non-tender, positive bowel sounds, no rebound or guarding, no organomegaly   EXTREMITIES:  No edema  VASCULAR:  Normal pedal pulses   SKIN: No lesions or rashes on exposed skin  NEURO:  CN II-XII intact, no focal deficits  "

## 2025-04-29 ENCOUNTER — HOSPITAL ENCOUNTER (OUTPATIENT)
Age: 70
Discharge: HOME | End: 2025-04-29
Payer: MEDICARE

## 2025-04-29 DIAGNOSIS — Z12.31 ENCOUNTER FOR SCREENING MAMMOGRAM FOR BREAST CANCER: ICD-10-CM

## 2025-04-29 PROCEDURE — 77063 BREAST TOMOSYNTHESIS BI: CPT

## 2025-04-29 PROCEDURE — 77067 SCR MAMMO BI INCL CAD: CPT

## 2025-05-19 DIAGNOSIS — F41.9 ANXIETY: ICD-10-CM

## 2025-05-19 RX ORDER — LORAZEPAM 0.5 MG/1
0.5 TABLET ORAL DAILY PRN
Qty: 30 TABLET | Refills: 0 | Status: SHIPPED | OUTPATIENT
Start: 2025-05-19

## 2025-05-19 NOTE — TELEPHONE ENCOUNTER
Reason for call:   [x] Refill   [] Prior Auth  [] Other:     Office:   [x] PCP/Provider - Janelle Smith  [] Specialty/Provider -     Medication: Lorazepam    Dose/Frequency: 0.5 mg Daily PRN    Quantity: 30    [facility] [place]   Does the patient have enough for 3 days?   [x] Yes   [] No - Send as HP to POD    Mail Away Pharmacy   Does the patient have enough for 10 days?   [] Yes   [] No - Send as HP to POD

## 2025-05-30 ENCOUNTER — APPOINTMENT (OUTPATIENT)
Dept: LAB | Facility: CLINIC | Age: 70
End: 2025-05-30
Payer: MEDICARE

## 2025-05-30 DIAGNOSIS — M35.9 UNDIFFERENTIATED CONNECTIVE TISSUE DISEASE (HCC): ICD-10-CM

## 2025-05-30 LAB
ALBUMIN SERPL BCG-MCNC: 4 G/DL (ref 3.5–5)
ALP SERPL-CCNC: 70 U/L (ref 34–104)
ALT SERPL W P-5'-P-CCNC: 12 U/L (ref 7–52)
ANION GAP SERPL CALCULATED.3IONS-SCNC: 10 MMOL/L (ref 4–13)
AST SERPL W P-5'-P-CCNC: 19 U/L (ref 13–39)
BACTERIA UR QL AUTO: NORMAL /HPF
BASOPHILS # BLD AUTO: 0.05 THOUSANDS/ÂΜL (ref 0–0.1)
BASOPHILS NFR BLD AUTO: 1 % (ref 0–1)
BILIRUB SERPL-MCNC: 0.71 MG/DL (ref 0.2–1)
BILIRUB UR QL STRIP: NEGATIVE
BUN SERPL-MCNC: 16 MG/DL (ref 5–25)
C3 SERPL-MCNC: 126 MG/DL (ref 87–200)
C4 SERPL-MCNC: 23 MG/DL (ref 19–52)
CALCIUM SERPL-MCNC: 8.9 MG/DL (ref 8.4–10.2)
CHLORIDE SERPL-SCNC: 105 MMOL/L (ref 96–108)
CLARITY UR: CLEAR
CO2 SERPL-SCNC: 25 MMOL/L (ref 21–32)
COLOR UR: NORMAL
CREAT SERPL-MCNC: 0.62 MG/DL (ref 0.6–1.3)
CRP SERPL QL: 2.3 MG/L
EOSINOPHIL # BLD AUTO: 0.14 THOUSAND/ÂΜL (ref 0–0.61)
EOSINOPHIL NFR BLD AUTO: 3 % (ref 0–6)
ERYTHROCYTE [DISTWIDTH] IN BLOOD BY AUTOMATED COUNT: 12.6 % (ref 11.6–15.1)
GFR SERPL CREATININE-BSD FRML MDRD: 92 ML/MIN/1.73SQ M
GLUCOSE P FAST SERPL-MCNC: 75 MG/DL (ref 65–99)
GLUCOSE UR STRIP-MCNC: NEGATIVE MG/DL
HCT VFR BLD AUTO: 37.1 % (ref 34.8–46.1)
HGB BLD-MCNC: 12 G/DL (ref 11.5–15.4)
HGB UR QL STRIP.AUTO: NEGATIVE
IMM GRANULOCYTES # BLD AUTO: 0.01 THOUSAND/UL (ref 0–0.2)
IMM GRANULOCYTES NFR BLD AUTO: 0 % (ref 0–2)
KETONES UR STRIP-MCNC: NEGATIVE MG/DL
LEUKOCYTE ESTERASE UR QL STRIP: NEGATIVE
LYMPHOCYTES # BLD AUTO: 1.3 THOUSANDS/ÂΜL (ref 0.6–4.47)
LYMPHOCYTES NFR BLD AUTO: 27 % (ref 14–44)
MCH RBC QN AUTO: 30.6 PG (ref 26.8–34.3)
MCHC RBC AUTO-ENTMCNC: 32.3 G/DL (ref 31.4–37.4)
MCV RBC AUTO: 95 FL (ref 82–98)
MONOCYTES # BLD AUTO: 0.42 THOUSAND/ÂΜL (ref 0.17–1.22)
MONOCYTES NFR BLD AUTO: 9 % (ref 4–12)
NEUTROPHILS # BLD AUTO: 2.84 THOUSANDS/ÂΜL (ref 1.85–7.62)
NEUTS SEG NFR BLD AUTO: 60 % (ref 43–75)
NITRITE UR QL STRIP: NEGATIVE
NON-SQ EPI CELLS URNS QL MICRO: NORMAL /HPF
NRBC BLD AUTO-RTO: 0 /100 WBCS
PH UR STRIP.AUTO: 6 [PH]
PLATELET # BLD AUTO: 231 THOUSANDS/UL (ref 149–390)
PMV BLD AUTO: 10 FL (ref 8.9–12.7)
POTASSIUM SERPL-SCNC: 4.2 MMOL/L (ref 3.5–5.3)
PROT SERPL-MCNC: 6.5 G/DL (ref 6.4–8.4)
PROT UR STRIP-MCNC: NEGATIVE MG/DL
RBC # BLD AUTO: 3.92 MILLION/UL (ref 3.81–5.12)
RBC #/AREA URNS AUTO: NORMAL /HPF
SODIUM SERPL-SCNC: 140 MMOL/L (ref 135–147)
SP GR UR STRIP.AUTO: 1.02 (ref 1–1.03)
UROBILINOGEN UR STRIP-ACNC: <2 MG/DL
WBC # BLD AUTO: 4.76 THOUSAND/UL (ref 4.31–10.16)
WBC #/AREA URNS AUTO: NORMAL /HPF

## 2025-05-30 PROCEDURE — 80053 COMPREHEN METABOLIC PANEL: CPT

## 2025-05-30 PROCEDURE — 36415 COLL VENOUS BLD VENIPUNCTURE: CPT

## 2025-05-30 PROCEDURE — 86225 DNA ANTIBODY NATIVE: CPT

## 2025-05-30 PROCEDURE — 85025 COMPLETE CBC W/AUTO DIFF WBC: CPT

## 2025-05-30 PROCEDURE — 86140 C-REACTIVE PROTEIN: CPT

## 2025-05-30 PROCEDURE — 86160 COMPLEMENT ANTIGEN: CPT

## 2025-06-03 LAB — DSDNA AB SER QL CLIF: NEGATIVE

## 2025-06-12 ENCOUNTER — OFFICE VISIT (OUTPATIENT)
Age: 70
End: 2025-06-12

## 2025-06-12 VITALS
WEIGHT: 156.7 LBS | BODY MASS INDEX: 25.29 KG/M2 | SYSTOLIC BLOOD PRESSURE: 118 MMHG | DIASTOLIC BLOOD PRESSURE: 78 MMHG | OXYGEN SATURATION: 98 %

## 2025-06-12 DIAGNOSIS — D68.61 ANTI-PHOSPHOLIPID ANTIBODY SYNDROME (HCC): ICD-10-CM

## 2025-06-12 DIAGNOSIS — R05.3 CHRONIC COUGH: ICD-10-CM

## 2025-06-12 DIAGNOSIS — K21.9 LARYNGOPHARYNGEAL REFLUX (LPR): ICD-10-CM

## 2025-06-12 DIAGNOSIS — G62.9 SMALL FIBER NEUROPATHY: ICD-10-CM

## 2025-06-12 DIAGNOSIS — M35.9 UNDIFFERENTIATED CONNECTIVE TISSUE DISEASE (HCC): Primary | ICD-10-CM

## 2025-06-12 DIAGNOSIS — M79.7 FIBROMYALGIA: ICD-10-CM

## 2025-06-12 DIAGNOSIS — M85.89 OSTEOPENIA OF MULTIPLE SITES: ICD-10-CM

## 2025-06-12 DIAGNOSIS — F41.1 GENERALIZED ANXIETY DISORDER: ICD-10-CM

## 2025-06-12 DIAGNOSIS — M35.00 SJOGREN'S SYNDROME WITHOUT EXTRAGLANDULAR INVOLVEMENT (HCC): ICD-10-CM

## 2025-06-12 NOTE — PROGRESS NOTES
Assessment/Plan:    1. Undifferentiated connective tissue disease (HCC)  -     C-reactive protein; Future; Expected date: 01/29/2026 (Use expected date for collection)  -     CBC and differential; Future; Expected date: 01/29/2026 (Use expected date for collection)  -     Comprehensive metabolic panel; Future; Expected date: 01/29/2026 (Use expected date for collection)  -     dsDNA Antibody by IFA, Crithidia luciliae, with Reflex to Titer; Future; Expected date: 01/29/2026 (Use expected date for collection)  -     C3 complement; Future; Expected date: 01/29/2026 (Use expected date for collection)  -     C4 complement; Future; Expected date: 01/29/2026 (Use expected date for collection)  -     Urinalysis with microscopic; Future; Expected date: 01/29/2026 (Use expected date for collection)  2. Sjogren's syndrome without extraglandular involvement (HCC)  -     C-reactive protein; Future; Expected date: 01/29/2026 (Use expected date for collection)  -     CBC and differential; Future; Expected date: 01/29/2026 (Use expected date for collection)  -     Comprehensive metabolic panel; Future; Expected date: 01/29/2026 (Use expected date for collection)  -     dsDNA Antibody by IFA, Crithidia luciliae, with Reflex to Titer; Future; Expected date: 01/29/2026 (Use expected date for collection)  -     C3 complement; Future; Expected date: 01/29/2026 (Use expected date for collection)  -     C4 complement; Future; Expected date: 01/29/2026 (Use expected date for collection)  -     Urinalysis with microscopic; Future; Expected date: 01/29/2026 (Use expected date for collection)  3. Fibromyalgia  4. Anti-phospholipid antibody syndrome (HCC)  5. Osteopenia of multiple sites  6. Small fiber neuropathy  7. Generalized anxiety disorder  8. Laryngopharyngeal reflux (LPR)  9. Chronic cough      Assessment & Plan  1. Undifferentiated Connective Tissue Disease.  - Positive VANESSA with prior lupus testing showing a negative index and low  probability for lupus.  Weakly positive IgG cardiolipin antibody with positive beta-2 glycoprotein 1 IgM positive with no history of DVT or pulmonary embolism.  Lupus anticoagulant negative.  No evidence clinically for phospholipid antibody syndrome.  - No evidence of lupus activity on repeat lab work.  Will continue to monitor for clinical signs and symptoms of connective tissue disease.  - Continue to monitor with lab work as ordered.  Follow-up 6 months or sooner if needed.    2. Sjogren's Syndrome.  -Sjogren syndrome with dry eyes stable with Cequa drops.  Follow-up with optometrist as scheduled.  - Positive feedback from dentist regarding dental health.  - Regular use of sodium fluoride paste, Biotene spray,  and xylitol mints for dry mouth.  - Encourage vigilant dental hygiene and follow-up dental check-ups every four months.  Continue to monitor.    3. Fibromyalgia.  - Fibromyalgia overall improved with the anti-inflammatory diet, cognitive behavioral therapy, and home exercise program.  Intermittent symptoms consistent with small fiber neuropathy exacerbated with stress.  Encouraged home exercise program as tolerated.  - Symptoms are well managed.  - No changes in treatment necessary.    4. Meibomian Gland Dysfunction.  - Underwent Prokera procedure with symptom improvement.  - Effective use of Systane and Avenova lid and lash cleansing drops.  - Advised to continue treatments and maintain eyelid hygiene.  Follow-up with optometrist as scheduled.    5. Muscle Tension Dysphonia.  - Recommended to start physical therapy as advised by ENT specialist.  - Encouraged to find time to start therapy for symptom improvement.    6. Osteopenia.  - Last DEXA scan dated 10/24/2024 showed osteopenia with low risk for fracture.  There was a drop in BMD in her hip noted, however, on review of images, there does appear to be a slight difference in hip positioning which would make the comparison was valid.  - Advised to take  calcium by diet or supplements and vitamin D. Encourage home exercises as tolerated.  - Monitor DEXA scan every 2 years to be scheduled for 10/2026.    7. Small Fiber Neuropathy.  - Experiences facial numbness and tingling likely due to small fiber neuropathy exacerbated by stress.  - Advised to manage stress through cognitive behavioral therapy and other stress-reduction techniques.  No need for medication at this time.  Continue to monitor.    8. Rash.  - Erythematous rash noted on neck and anterior chest likely due to sun exposure.  - Advised to apply sunblock daily to all exposed areas of skin.    Orders Placed This Encounter   Procedures    C-reactive protein    CBC and differential    Comprehensive metabolic panel    dsDNA Antibody by IFA, Felice jacobo, with Reflex to Titer    C3 complement    C4 complement    Urinalysis with microscopic          Reviewed records, labs, and imaging with the patient in detail.  Counseled patient.  Discussion regarding my findings and recommendations.  Office visit with documentation 35 min.    Subjective:      Patient ID: Martine Meza is a 69 y.o. female.    History of Present Illness  The patient presents for follow-up of undifferentiated connective tissue disease, Sjogren's syndrome, and fibromyalgia.    She has a history of positive VANESSA , with prior lupus Avise testing, which showed a negative index and low probability for lupus. Additionally, she has weakly positive IgG cardiolipin antibody and positive beta-2 glycoprotein IgM but has no history of DVT or pulmonary embolism. Lupus anticoagulant is negative, and there is no clinical evidence for phospholipid antibody syndrome or lupus activity on repeat lab work. There was a question of paroxysmal atrial fibrillation in the past, but it was not confirmed and felt to be more likely atrial tachycardia.    She continues to use Systane for her dry eyes, which she reports as being effective. She was diagnosed with  meibomian gland dysfunction and underwent a Prokera procedure two weeks ago. Prior to the procedure, she had only four open glands in each eye. She maintains her eye health by using wipes and sleeping with humidifiers. She also uses Avenova lid and lash cleansing spray, which she finds beneficial.    For her dry mouth, she uses sodium fluoride paste and Biotene. Her dentist has commended her dental health, and she undergoes cleanings every four months.    She experiences facial numbness and tingling, which she attributes to stress. She recently consulted an ENT specialist, Dr. Serrato, who recommended physical therapy for her globus sensation. He explained that her symptoms are due to muscle tension dysphonia, which causes her voice to become raspy. She has not yet started the therapy due to time constraints but plans to do so when her schedule allows.    She has gained weight and acknowledges the need for exercise but struggles to find time due to her work and personal responsibilities. She adheres to her diet and therapy regimen. She does not consume cow's milk or dairy products.    She developed a rash two weeks ago, which she believes is related to Sjogren's syndrome and sun exposure. She was at the beach but wore a sweatshirt.  HPI    Results  Labs   - CRP: 05/30/2025, 2.3   - C4 complement: 05/30/2025, 23   - C3 complement: 05/30/2025, 126   - Double stranded DNA antibody: 05/30/2025, Negative   - Creatinine: 05/30/2025, 0.62   - Estimated GFR: 05/30/2025, 92   - Calcium: 05/30/2025, 8.9   - White blood cell count: 05/30/2025, 4.76   - Absolute neutrophil count: 05/30/2025, 2.84   - Platelet count: 05/30/2025, 231   - Urinalysis: 05/30/2025, Unremarkable       Allergies  No Known Allergies    Home Medications  Current Medications[1]    Past Medical History  Past Medical History[2]    Past Surgical History   Past Surgical History[3]    Family History   Family History[4]    The following portions of the  "patient's history were reviewed and updated as appropriate: allergies, current medications, past family history, past medical history, past social history, past surgical history, and problem list.    Review of Systems   Constitutional:  Positive for fatigue. Negative for chills and fever.        Fatigue persists  Weight loss more than 40 pounds since initial office visit with anti inflammatory diet   HENT:  Positive for dental problem, mouth sores and trouble swallowing (better). Negative for ear pain and sore throat.         Dry mouth using Biotene  Painful mouth sores occasionally  Metallic taste in mouth better  Follows with Dr. Serrato, ENT.   Eyes:  Negative for pain and visual disturbance.        Dry eyes on Cequa   Respiratory:  Positive for cough. Negative for shortness of breath.         Dry cough associated with stress   Cardiovascular:  Positive for chest pain. Negative for palpitations.        Hx PAF not confirmed, more likely PAC's   Gastrointestinal:  Negative for abdominal pain and vomiting.   Genitourinary:  Negative for dysuria and hematuria.        Nocturia 8 times nightly  Drinking a lot   Musculoskeletal:  Positive for arthralgias, back pain, neck pain and neck stiffness.   Skin:  Negative for color change and rash.        Urticaria resolved  Hair thinning  Nail changes with dryness and \"\" from nailbeds   Neurological:  Positive for numbness and headaches. Negative for seizures and syncope.   Psychiatric/Behavioral:          Brain fog persists  Anxiety still a problem due to caring for elderly mother. Patient currently in CBT.   All other systems reviewed and are negative.        Objective:  /78   Wt 71.1 kg (156 lb 11.2 oz)   SpO2 98%   BMI 25.29 kg/m²        Physical Exam    Physical Exam  Vitals reviewed.   Constitutional:       Appearance: Normal appearance.   HENT:      Head: Normocephalic.      Nose:      Comments: Nose and throat unremarkable.    Eyes:      Extraocular " Movements: Extraocular movements intact.      Comments: Scleral injection bilaterally   Neck:      Comments: Without masses, thyromegaly, lymphadenopathy  Cardiovascular:      Rate and Rhythm: Regular rhythm.   Pulmonary:      Breath sounds: Normal breath sounds.   Abdominal:      Palpations: Abdomen is soft.     Musculoskeletal:      Cervical back: Neck supple.      Comments: Neck mildly decreased lateral flexion with tightness posterior cervical and shoulder girdle muscles.  Shoulders full range of motion.  Elbows full range of motion with triggers medial and lateral epicondyles.  Wrists full range of motion.  Tinel's negative bilaterally.  Hands squaring 1st carpometacarpal joints bilaterally with mild interphalangeal osteoarthritis.  No synovitis noted.  Spasm dorsal and lumbosacral paraspinals.  Straight leg raising negative bilaterally.  Schober's negative.  No SI joint tenderness appreciated.  Hips full range of motion with triggers trochanteric bursae.  Knees small cool effusions with patellofemoral crepitus with triggers pes anserine bursa.  Ankles full range of motion.  Feet rearfoot hyperpronation with midfoot cavus deformities, high insteps, early hallux valgus deformities and scattered hammertoe deformities.  No synovitis noted.     Skin:     General: Skin is warm.      Comments: Erythematous macular rash neck and anterior chest wall likely photosensitive rash.     Neurological:      General: No focal deficit present.                 This note was written in part using the assistance of the [employer] [id number] microphone system. Those portions using this system have been dictated and not read.         [1]   Current Outpatient Medications:     Ascorbic Acid (vitamin C) 1000 MG tablet, Take 1,000 mg by mouth in the morning., Disp: , Rfl:     azelastine (ASTELIN) 0.1 % nasal spray, 1 spray into each nostril 2 (two) times a day Use in each nostril as directed, Disp: 30 mL, Rfl:  2    Cequa 0.09 % SOLN, , Disp: , Rfl:     Cholecalciferol (Vitamin D3) 125 MCG (5000 UT) TABS, Take 5,000 Units by mouth in the morning., Disp: , Rfl:     Docosahexaenoic Acid (DHA PO), Take 500 mg by mouth in the morning., Disp: , Rfl:     glucosamine-chondroitin 500-400 MG tablet, Take 1 tablet by mouth in the morning., Disp: , Rfl:     GLUCOSAMINE-CHONDROITIN-MSM PO, Take 300 mg by mouth in the morning and 300 mg in the evening and 300 mg before bedtime., Disp: , Rfl:     LORazepam (ATIVAN) 0.5 mg tablet, Take 1 tablet (0.5 mg total) by mouth daily as needed for anxiety, Disp: 30 tablet, Rfl: 0    MAGNESIUM GLUCONATE PO, Take 120 mg by mouth in the morning., Disp: , Rfl:     MANGANESE PO, Take 2 mg by mouth, Disp: , Rfl:     multivitamin (THERAGRAN) TABS, Take 1 tablet by mouth in the morning., Disp: , Rfl:     NIACIN PO, Take 20 mg by mouth, Disp: , Rfl:     QUERCETIN PO, Take 500 mg by mouth in the morning., Disp: , Rfl:     SODIUM FLUORIDE, DENTAL GEL, (SF 5000 Plus) 1.1 % CREA, USE ON TEETH NIGHTLY, Disp: 102 g, Rfl: 2    TURMERIC CURCUMIN PO, Take 1 g by mouth in the morning., Disp: , Rfl:     Zinc 30 MG TABS, Take 30 mg by mouth, Disp: , Rfl:     MELATONIN ER PO, Take 0.5 mg by mouth daily (Patient not taking: Reported on 4/2/2025), Disp: , Rfl:     Multiple Vitamins-Minerals (HAIR SKIN NAILS PO), Take 2 capsules by mouth daily (Patient not taking: Reported on 4/2/2025), Disp: , Rfl:     zolpidem (AMBIEN) 10 mg tablet, Take 1 tablet (10 mg total) by mouth daily at bedtime as needed for sleep (Patient not taking: Reported on 5/2/2024), Disp: 30 tablet, Rfl: 0  [2]   Past Medical History:  Diagnosis Date    Atrial fibrillation ([facility]) 02/06/2014    Fatigue     Headache(784.0)     Hyperlipidemia     PAF (paroxysmal atrial fibrillation) ([facility]) 02/21/2023    Situational anxiety     Situational insomnia     Sjogren's syndrome (HCC)    [3]   Past Surgical History:  Procedure Laterality Date    BREAST BIOPSY Left  2001    neg    TONSILLECTOMY     [4]   Family History  Problem Relation Name Age of Onset    Heart failure Mother Ronnie Stiansen     Hypertension Mother Ronnie Stiansen     Edema Mother Ronnie Stiansguido         lymphedema    Celiac disease Father Ronnie     Other Father Ronnie         Pacemaker placement    Heart disease Father Ronnie     Hypertension Father Ronnie     Prostate cancer Father Ronnie     Dementia Father Ronnie     Gout Father Ronnie     Heart failure Father Ronnie     Cancer Father Ronnie     Hearing loss Father Ronnie     Celiac disease Daughter      Allergies Daughter      No Known Problems Maternal Grandmother      No Known Problems Paternal Grandmother      Other Family          Congenital heart defect    No Known Problems Maternal Aunt      Breast cancer Neg Hx

## 2025-06-12 NOTE — PATIENT INSTRUCTIONS
Today continue with your program as before.  Continue follow-up with the physicians you were seen.  We will plan to repeat your bone density study next year at the [facility].  Continue calcium by diet and vitamin D.  Follow-up with Dr. Serrato.  I do think that you will notice a difference with the speech therapy for the muscle tension dysphonia.  Continue follow-up with the cognitive behavioral therapist.  That is something that everyone should be doing.  Get the lab work 2 weeks before your next office visit.

## 2025-06-18 DIAGNOSIS — M35.00 SJOGREN'S SYNDROME WITHOUT EXTRAGLANDULAR INVOLVEMENT (HCC): ICD-10-CM

## 2025-06-18 RX ORDER — SODIUM FLUORIDE 5 MG/ML
PASTE, DENTIFRICE DENTAL
Qty: 102 G | Refills: 1 | Status: SHIPPED | OUTPATIENT
Start: 2025-06-18

## 2025-07-11 ENCOUNTER — TELEPHONE (OUTPATIENT)
Age: 70
End: 2025-07-11

## 2025-07-11 NOTE — TELEPHONE ENCOUNTER
LM to offer f/u appt with Dr. Blackmon. Pt is due for January but next availability is on February.

## 2025-07-18 NOTE — TELEPHONE ENCOUNTER
PETRA for patient to call back and schedule for a f/u appointment with Dr. Blackmon at the [facility] [address] [facility].   (Please avoid Monday afternoons with Dr. Blackmon)

## 2025-07-21 ENCOUNTER — APPOINTMENT (OUTPATIENT)
Dept: LAB | Facility: CLINIC | Age: 70
End: 2025-07-21
Payer: MEDICARE

## 2025-07-21 DIAGNOSIS — Z79.899 ENCOUNTER FOR LONG-TERM CURRENT USE OF MEDICATION: ICD-10-CM

## 2025-07-21 DIAGNOSIS — E78.5 DYSLIPIDEMIA: ICD-10-CM

## 2025-07-21 DIAGNOSIS — E55.9 VITAMIN D DEFICIENCY: ICD-10-CM

## 2025-07-21 DIAGNOSIS — R00.2 PALPITATIONS: ICD-10-CM

## 2025-07-21 DIAGNOSIS — R73.01 ABNORMAL FASTING GLUCOSE: ICD-10-CM

## 2025-07-21 LAB
25(OH)D3 SERPL-MCNC: 54.1 NG/ML (ref 30–100)
ALBUMIN SERPL BCG-MCNC: 4.1 G/DL (ref 3.5–5)
ALP SERPL-CCNC: 75 U/L (ref 34–104)
ALT SERPL W P-5'-P-CCNC: 15 U/L (ref 7–52)
ANION GAP SERPL CALCULATED.3IONS-SCNC: 8 MMOL/L (ref 4–13)
AST SERPL W P-5'-P-CCNC: 21 U/L (ref 13–39)
BASOPHILS # BLD AUTO: 0.06 THOUSANDS/ÂΜL (ref 0–0.1)
BASOPHILS NFR BLD AUTO: 2 % (ref 0–1)
BILIRUB SERPL-MCNC: 0.7 MG/DL (ref 0.2–1)
BUN SERPL-MCNC: 20 MG/DL (ref 5–25)
CALCIUM SERPL-MCNC: 9.1 MG/DL (ref 8.4–10.2)
CHLORIDE SERPL-SCNC: 106 MMOL/L (ref 96–108)
CHOLEST SERPL-MCNC: 190 MG/DL (ref ?–200)
CO2 SERPL-SCNC: 28 MMOL/L (ref 21–32)
CREAT SERPL-MCNC: 0.72 MG/DL (ref 0.6–1.3)
EOSINOPHIL # BLD AUTO: 0.14 THOUSAND/ÂΜL (ref 0–0.61)
EOSINOPHIL NFR BLD AUTO: 4 % (ref 0–6)
ERYTHROCYTE [DISTWIDTH] IN BLOOD BY AUTOMATED COUNT: 12.6 % (ref 11.6–15.1)
EST. AVERAGE GLUCOSE BLD GHB EST-MCNC: 117 MG/DL
GFR SERPL CREATININE-BSD FRML MDRD: 86 ML/MIN/1.73SQ M
GLUCOSE P FAST SERPL-MCNC: 83 MG/DL (ref 65–99)
HBA1C MFR BLD: 5.7 %
HCT VFR BLD AUTO: 38.7 % (ref 34.8–46.1)
HDLC SERPL-MCNC: 78 MG/DL
HGB BLD-MCNC: 12.3 G/DL (ref 11.5–15.4)
IMM GRANULOCYTES # BLD AUTO: 0.02 THOUSAND/UL (ref 0–0.2)
IMM GRANULOCYTES NFR BLD AUTO: 1 % (ref 0–2)
LDLC SERPL CALC-MCNC: 105 MG/DL (ref 0–100)
LYMPHOCYTES # BLD AUTO: 1.4 THOUSANDS/ÂΜL (ref 0.6–4.47)
LYMPHOCYTES NFR BLD AUTO: 35 % (ref 14–44)
MCH RBC QN AUTO: 30.4 PG (ref 26.8–34.3)
MCHC RBC AUTO-ENTMCNC: 31.8 G/DL (ref 31.4–37.4)
MCV RBC AUTO: 96 FL (ref 82–98)
MONOCYTES # BLD AUTO: 0.36 THOUSAND/ÂΜL (ref 0.17–1.22)
MONOCYTES NFR BLD AUTO: 9 % (ref 4–12)
NEUTROPHILS # BLD AUTO: 2.06 THOUSANDS/ÂΜL (ref 1.85–7.62)
NEUTS SEG NFR BLD AUTO: 49 % (ref 43–75)
NONHDLC SERPL-MCNC: 112 MG/DL
NRBC BLD AUTO-RTO: 0 /100 WBCS
PLATELET # BLD AUTO: 241 THOUSANDS/UL (ref 149–390)
PMV BLD AUTO: 10.5 FL (ref 8.9–12.7)
POTASSIUM SERPL-SCNC: 4.2 MMOL/L (ref 3.5–5.3)
PROT SERPL-MCNC: 6.8 G/DL (ref 6.4–8.4)
RBC # BLD AUTO: 4.05 MILLION/UL (ref 3.81–5.12)
SODIUM SERPL-SCNC: 142 MMOL/L (ref 135–147)
TRIGL SERPL-MCNC: 33 MG/DL (ref ?–150)
TSH SERPL DL<=0.05 MIU/L-ACNC: 3.63 UIU/ML (ref 0.45–4.5)
VIT B12 SERPL-MCNC: 332 PG/ML (ref 180–914)
WBC # BLD AUTO: 4.04 THOUSAND/UL (ref 4.31–10.16)

## 2025-07-21 PROCEDURE — 36415 COLL VENOUS BLD VENIPUNCTURE: CPT

## 2025-07-21 PROCEDURE — 83036 HEMOGLOBIN GLYCOSYLATED A1C: CPT

## 2025-07-21 PROCEDURE — 84443 ASSAY THYROID STIM HORMONE: CPT

## 2025-07-21 PROCEDURE — 80061 LIPID PANEL: CPT

## 2025-07-21 PROCEDURE — 80053 COMPREHEN METABOLIC PANEL: CPT

## 2025-07-21 PROCEDURE — 82306 VITAMIN D 25 HYDROXY: CPT

## 2025-07-21 PROCEDURE — 82607 VITAMIN B-12: CPT

## 2025-07-21 PROCEDURE — 85025 COMPLETE CBC W/AUTO DIFF WBC: CPT

## 2025-07-23 ENCOUNTER — RA CDI HCC (OUTPATIENT)
Dept: OTHER | Facility: HOSPITAL | Age: 70
End: 2025-07-23

## 2025-07-29 ENCOUNTER — OFFICE VISIT (OUTPATIENT)
Dept: INTERNAL MEDICINE CLINIC | Facility: CLINIC | Age: 70
End: 2025-07-29
Payer: MEDICARE

## 2025-07-29 VITALS
BODY MASS INDEX: 25.23 KG/M2 | HEART RATE: 80 BPM | WEIGHT: 157 LBS | SYSTOLIC BLOOD PRESSURE: 126 MMHG | OXYGEN SATURATION: 96 % | HEIGHT: 66 IN | DIASTOLIC BLOOD PRESSURE: 74 MMHG | TEMPERATURE: 98.6 F | RESPIRATION RATE: 14 BRPM

## 2025-07-29 DIAGNOSIS — E55.9 VITAMIN D DEFICIENCY: ICD-10-CM

## 2025-07-29 DIAGNOSIS — Z12.11 SCREENING FOR COLON CANCER: ICD-10-CM

## 2025-07-29 DIAGNOSIS — Z00.00 MEDICARE ANNUAL WELLNESS VISIT, SUBSEQUENT: ICD-10-CM

## 2025-07-29 DIAGNOSIS — M17.0 PRIMARY OSTEOARTHRITIS OF BOTH KNEES: ICD-10-CM

## 2025-07-29 DIAGNOSIS — K21.9 LARYNGOPHARYNGEAL REFLUX (LPR): ICD-10-CM

## 2025-07-29 DIAGNOSIS — R73.03 PREDIABETES: ICD-10-CM

## 2025-07-29 DIAGNOSIS — E78.5 DYSLIPIDEMIA: ICD-10-CM

## 2025-07-29 DIAGNOSIS — M35.00 SJOGREN'S SYNDROME WITHOUT EXTRAGLANDULAR INVOLVEMENT (HCC): Primary | ICD-10-CM

## 2025-07-29 DIAGNOSIS — F41.1 GENERALIZED ANXIETY DISORDER: ICD-10-CM

## 2025-07-29 DIAGNOSIS — M85.89 OSTEOPENIA OF MULTIPLE SITES: ICD-10-CM

## 2025-07-29 DIAGNOSIS — E53.8 VITAMIN B12 DEFICIENCY: ICD-10-CM

## 2025-07-29 DIAGNOSIS — G47.00 INSOMNIA, UNSPECIFIED TYPE: ICD-10-CM

## 2025-07-29 DIAGNOSIS — M35.9 UNDIFFERENTIATED CONNECTIVE TISSUE DISEASE (HCC): ICD-10-CM

## 2025-07-29 PROBLEM — F13.20 BENZODIAZEPINE DEPENDENCE (HCC): Status: RESOLVED | Noted: 2023-01-11 | Resolved: 2025-07-29

## 2025-07-29 PROCEDURE — G2211 COMPLEX E/M VISIT ADD ON: HCPCS | Performed by: INTERNAL MEDICINE

## 2025-07-29 PROCEDURE — 99214 OFFICE O/P EST MOD 30 MIN: CPT | Performed by: INTERNAL MEDICINE

## 2025-07-29 PROCEDURE — G0439 PPPS, SUBSEQ VISIT: HCPCS | Performed by: INTERNAL MEDICINE

## 2025-07-29 RX ORDER — ZOLPIDEM TARTRATE 10 MG/1
10 TABLET ORAL
Qty: 30 TABLET | Refills: 0 | Status: SHIPPED | OUTPATIENT
Start: 2025-07-29

## 2025-07-29 RX ORDER — LORAZEPAM 0.5 MG/1
0.5 TABLET ORAL DAILY PRN
Qty: 30 TABLET | Refills: 0 | Status: SHIPPED | OUTPATIENT
Start: 2025-07-29